# Patient Record
Sex: FEMALE | Race: WHITE | NOT HISPANIC OR LATINO | Employment: FULL TIME | URBAN - METROPOLITAN AREA
[De-identification: names, ages, dates, MRNs, and addresses within clinical notes are randomized per-mention and may not be internally consistent; named-entity substitution may affect disease eponyms.]

---

## 2019-05-03 ENCOUNTER — OFFICE VISIT (OUTPATIENT)
Dept: FAMILY MEDICINE CLINIC | Facility: CLINIC | Age: 24
End: 2019-05-03

## 2019-05-03 VITALS
HEART RATE: 87 BPM | WEIGHT: 175.9 LBS | RESPIRATION RATE: 19 BRPM | SYSTOLIC BLOOD PRESSURE: 114 MMHG | HEIGHT: 67 IN | TEMPERATURE: 98.4 F | OXYGEN SATURATION: 99 % | DIASTOLIC BLOOD PRESSURE: 74 MMHG | BODY MASS INDEX: 27.61 KG/M2

## 2019-05-03 DIAGNOSIS — H81.01 MENIERE SYNDROME, RIGHT: Primary | ICD-10-CM

## 2019-05-03 PROCEDURE — 99214 OFFICE O/P EST MOD 30 MIN: CPT | Performed by: FAMILY MEDICINE

## 2019-05-03 RX ORDER — MECLIZINE HYDROCHLORIDE 25 MG/1
25 TABLET ORAL 3 TIMES DAILY PRN
Qty: 21 TABLET | Refills: 0 | Status: SHIPPED | OUTPATIENT
Start: 2019-05-03 | End: 2019-07-03

## 2019-05-03 RX ORDER — TRIAMTERENE AND HYDROCHLOROTHIAZIDE 37.5; 25 MG/1; MG/1
TABLET ORAL
Qty: 20 TABLET | Refills: 0 | Status: SHIPPED | OUTPATIENT
Start: 2019-05-03 | End: 2019-07-03

## 2019-05-03 NOTE — PROGRESS NOTES
"Subjective   Adriel Armenta is a 23 y.o. female.     Chief Complaint   Patient presents with   • Tinnitus     started  with vertigo and now has ringing in ears        History of Present Illness    About 2 weeks ago woke up with dizziness.  Vertigo.  It lasted for the better part of 2 or 3 days on and off.  Fairly steady throughout the day.  It was worse when she was turning her head to one side.  Or looking up.  She felt unsteady with a spinning sensation.  She had a similar episode a few years ago.  She had some mild meclizine tablets which she started taking that helped the dizziness.  Then she developed some hearing changes and in particular tinnitus which is quite bothersome and bothering her last night.  She has had no headaches.  No visual changes.  She feels a little unsteady walking.  No trouble with coordination.  She drinks about 3 or 4 drinks at night after work.  No focal neurological deficits such as weakness or numbness.  She states she is not pregnant.  LMP less than 1 month ago.      The following portions of the patient's history were reviewed and updated as appropriate: allergies, current medications, past family history, past medical history, past social history, past surgical history and problem list.          Review of Systems   Constitutional: Negative.  Negative for fatigue and fever.   HENT: Positive for tinnitus. Negative for congestion.    Respiratory: Negative.  Negative for cough.    Cardiovascular: Negative.    Gastrointestinal: Negative.    Musculoskeletal: Negative.    Skin: Negative for rash.   Neurological: Positive for dizziness.       Objective   Blood pressure 114/74, pulse 87, temperature 98.4 °F (36.9 °C), temperature source Oral, resp. rate 19, height 170.2 cm (67\"), weight 79.8 kg (175 lb 14.4 oz), last menstrual period 04/07/2019, SpO2 99 %, not currently breastfeeding.  Physical Exam   Constitutional: She is oriented to person, place, and time. No distress.   No acute " distress.  Nontoxic.   HENT:   Right Ear: Tympanic membrane, external ear and ear canal normal.   Left Ear: Tympanic membrane, external ear and ear canal normal.   Nose: Nose normal.   Mouth/Throat: Oropharynx is clear and moist. No oropharyngeal exudate.   Eyes: Conjunctivae and EOM are normal. Pupils are equal, round, and reactive to light. Right eye exhibits no discharge. Left eye exhibits no discharge. No scleral icterus.   Cardiovascular: Normal rate.   Pulmonary/Chest: Effort normal and breath sounds normal. No stridor. No respiratory distress. She has no wheezes. She has no rales.   No tachypnea   Lymphadenopathy:     She has no cervical adenopathy.   Neurological: She is alert and oriented to person, place, and time. She exhibits normal muscle tone. Coordination normal.   Neurological exam.  Pupils equal and reactive to light.  Extra ocular muscle movements intact all directions.  Face symmetric.   No ataxia.  No dysmetria.  Gait is a little unsteady with heel-to-toe.  Good finger-to-nose.  She has brief rotatory nystagmus with Arnold-Hallpike to the right.  However not reproducible.     Skin: No rash noted.   Nursing note and vitals reviewed.      Assessment/Plan   Adriel was seen today for tinnitus.    Diagnoses and all orders for this visit:    Meniere syndrome, right    Other orders  -     meclizine (ANTIVERT) 25 MG tablet; Take 1 tablet by mouth 3 (Three) Times a Day As Needed for dizziness.  -     triamterene-hydrochlorothiazide (MAXZIDE-25) 37.5-25 MG per tablet; 1/2 - 1 tab po qd for meniere's syndrome      Vertigo.  Tinnitus.  Suggestive of Ménière syndrome.  Likely peripheral vertical and not central.  No red flags at this time.  I recommend meclizine as needed.  Recommend Maxide 1/2 tablet to start with daily.  I will see her back in 3 weeks.  If symptoms get much worse will need further investigation sooner.  With persistent symptoms I recommend an MRI of the vestibular components.  She will call  with questions

## 2019-07-03 ENCOUNTER — OFFICE VISIT (OUTPATIENT)
Dept: FAMILY MEDICINE CLINIC | Facility: CLINIC | Age: 24
End: 2019-07-03

## 2019-07-03 VITALS
TEMPERATURE: 97.3 F | OXYGEN SATURATION: 99 % | HEIGHT: 68 IN | DIASTOLIC BLOOD PRESSURE: 88 MMHG | WEIGHT: 183.1 LBS | BODY MASS INDEX: 27.75 KG/M2 | SYSTOLIC BLOOD PRESSURE: 129 MMHG | HEART RATE: 92 BPM

## 2019-07-03 DIAGNOSIS — H91.8X3 OTHER SPECIFIED HEARING LOSS OF BOTH EARS: Primary | ICD-10-CM

## 2019-07-03 DIAGNOSIS — G47.09 OTHER INSOMNIA: ICD-10-CM

## 2019-07-03 PROCEDURE — 99213 OFFICE O/P EST LOW 20 MIN: CPT | Performed by: FAMILY MEDICINE

## 2019-07-03 RX ORDER — FEXOFENADINE HCL 180 MG/1
180 TABLET ORAL DAILY
Qty: 30 TABLET | Refills: 2 | Status: SHIPPED | OUTPATIENT
Start: 2019-07-03 | End: 2019-08-15 | Stop reason: SDUPTHER

## 2019-07-03 RX ORDER — FLUTICASONE PROPIONATE 50 MCG
2 SPRAY, SUSPENSION (ML) NASAL DAILY
Qty: 16 G | Refills: 11 | Status: SHIPPED | OUTPATIENT
Start: 2019-07-03 | End: 2019-10-22

## 2019-07-03 NOTE — PROGRESS NOTES
"Subjective   Ardiel Armenta is a 23 y.o. female.     Chief Complaint   Patient presents with   • Hearing Loss     bilateral x 22 months        History of Present Illness    Follow-up tinnitus and vertigo.  About 3 weeks ago I saw her for this.  Had going on for a number of weeks.  Concerned about Ménière syndrome.  She was started on triamterene hydrochlorothiazide 1/2 tablet a day.  She started taking it and the symptoms went away fairly fast.  She is now off the diuretic and has no recurrent tinnitus and no recurrent vertigo.  However she has some ongoing hearing loss.  Sometimes it feels like her ears are full and uncomfortable.  She has occasional allergy symptoms.  Her mother states she has had trouble with hearing for a number of months.  She has to use close captioning on the television.  She has some trouble with sleep she will wake up middle the night.  No snoring.  No recurrent depression symptoms..  No severe anxiety.  No evening caffeine use.  She has some poor sleep hygiene issues.      The following portions of the patient's history were reviewed and updated as appropriate: allergies, current medications, past family history, past medical history, past social history, past surgical history and problem list.          Review of Systems   Constitutional: Negative.  Negative for fatigue and fever.   HENT: Positive for ear pain and hearing loss.    Respiratory: Negative.    Cardiovascular: Negative.    Gastrointestinal: Negative.    Musculoskeletal: Negative.    Skin: Negative for rash.   Psychiatric/Behavioral: Positive for sleep disturbance. Negative for dysphoric mood. The patient is not nervous/anxious.        Objective   Blood pressure 129/88, pulse 92, temperature 97.3 °F (36.3 °C), temperature source Oral, height 172.7 cm (67.99\"), weight 83.1 kg (183 lb 1.6 oz), last menstrual period 06/11/2019, SpO2 99 %, not currently breastfeeding.  Physical Exam   Constitutional: No distress.   No acute " distress.  Nontoxic.   HENT:   Right Ear: Tympanic membrane, external ear and ear canal normal.   Left Ear: Tympanic membrane, external ear and ear canal normal.   Nose: Nose normal.   Mouth/Throat: Oropharynx is clear and moist. No oropharyngeal exudate.   Eyes: Conjunctivae are normal. Right eye exhibits no discharge. Left eye exhibits no discharge. No scleral icterus.   Neck: Normal range of motion. Neck supple. No thyromegaly present.   Cardiovascular: Normal rate.   Pulmonary/Chest: Effort normal and breath sounds normal. No stridor. No respiratory distress. She has no wheezes. She has no rales.   No tachypnea   Lymphadenopathy:     She has no cervical adenopathy.   Skin: No rash noted.   Nursing note and vitals reviewed.      Assessment/Plan   Adriel was seen today for hearing loss.    Diagnoses and all orders for this visit:    Other specified hearing loss of both ears  -     Ambulatory Referral to ENT (Otolaryngology)    Other insomnia    Other orders  -     fluticasone (FLONASE) 50 MCG/ACT nasal spray; 2 sprays into the nostril(s) as directed by provider Daily.  -     fexofenadine (ALLEGRA ALLERGY) 180 MG tablet; Take 1 tablet by mouth Daily.        Hearing loss.  Possible eustachian tube dysfunction.  However I am concerned about intermittent Ménière's syndrome.  I recommend an ENT referral and probable audiometry.  Also recommending Flonase daily and Allegra as needed.  I will see her back as needed.  The referral to ENT has been placed.    Insomnia.  Likely uncomplicated and not related to anxiety or depression.  Sleep hygiene discussed in detail.

## 2019-08-15 ENCOUNTER — OFFICE VISIT (OUTPATIENT)
Dept: FAMILY MEDICINE CLINIC | Facility: CLINIC | Age: 24
End: 2019-08-15

## 2019-08-15 VITALS
HEART RATE: 82 BPM | BODY MASS INDEX: 28.22 KG/M2 | TEMPERATURE: 97.5 F | DIASTOLIC BLOOD PRESSURE: 82 MMHG | SYSTOLIC BLOOD PRESSURE: 127 MMHG | HEIGHT: 68 IN | WEIGHT: 186.2 LBS | OXYGEN SATURATION: 99 %

## 2019-08-15 DIAGNOSIS — F10.10 ALCOHOL ABUSE: ICD-10-CM

## 2019-08-15 DIAGNOSIS — F32.89 OTHER DEPRESSION: Primary | ICD-10-CM

## 2019-08-15 PROBLEM — F32.A DEPRESSION: Status: ACTIVE | Noted: 2019-08-15

## 2019-08-15 PROCEDURE — 99214 OFFICE O/P EST MOD 30 MIN: CPT | Performed by: FAMILY MEDICINE

## 2019-08-15 RX ORDER — BUPROPION HYDROCHLORIDE 150 MG/1
150 TABLET ORAL DAILY
Qty: 30 TABLET | Refills: 3 | Status: SHIPPED | OUTPATIENT
Start: 2019-08-15 | End: 2019-09-06 | Stop reason: SDUPTHER
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
## 2019-08-16 LAB
ALBUMIN SERPL-MCNC: 4.1 G/DL (ref 3.5–5.2)
ALBUMIN/GLOB SERPL: 1.4 G/DL
ALP SERPL-CCNC: 61 U/L (ref 39–117)
ALT SERPL-CCNC: 17 U/L (ref 1–33)
AST SERPL-CCNC: 19 U/L (ref 1–32)
BASOPHILS # BLD AUTO: 0.08 10*3/MM3 (ref 0–0.2)
BASOPHILS NFR BLD AUTO: 0.8 % (ref 0–1.5)
BILIRUB SERPL-MCNC: 0.3 MG/DL (ref 0.2–1.2)
BUN SERPL-MCNC: 15 MG/DL (ref 6–20)
BUN/CREAT SERPL: 18.5 (ref 7–25)
CALCIUM SERPL-MCNC: 9.5 MG/DL (ref 8.6–10.5)
CHLORIDE SERPL-SCNC: 100 MMOL/L (ref 98–107)
CHOLEST SERPL-MCNC: 189 MG/DL (ref 0–200)
CO2 SERPL-SCNC: 26.6 MMOL/L (ref 22–29)
CREAT SERPL-MCNC: 0.81 MG/DL (ref 0.57–1)
EOSINOPHIL # BLD AUTO: 0.27 10*3/MM3 (ref 0–0.4)
EOSINOPHIL NFR BLD AUTO: 2.6 % (ref 0.3–6.2)
ERYTHROCYTE [DISTWIDTH] IN BLOOD BY AUTOMATED COUNT: 12.2 % (ref 12.3–15.4)
GLOBULIN SER CALC-MCNC: 2.9 GM/DL
GLUCOSE SERPL-MCNC: 129 MG/DL (ref 65–99)
HCT VFR BLD AUTO: 47.5 % (ref 34–46.6)
HDLC SERPL-MCNC: 59 MG/DL (ref 40–60)
HGB BLD-MCNC: 15 G/DL (ref 12–15.9)
IMM GRANULOCYTES # BLD AUTO: 0.04 10*3/MM3 (ref 0–0.05)
IMM GRANULOCYTES NFR BLD AUTO: 0.4 % (ref 0–0.5)
LDLC SERPL CALC-MCNC: 100 MG/DL (ref 0–100)
LYMPHOCYTES # BLD AUTO: 3.1 10*3/MM3 (ref 0.7–3.1)
LYMPHOCYTES NFR BLD AUTO: 29.9 % (ref 19.6–45.3)
MCH RBC QN AUTO: 31 PG (ref 26.6–33)
MCHC RBC AUTO-ENTMCNC: 31.6 G/DL (ref 31.5–35.7)
MCV RBC AUTO: 98.1 FL (ref 79–97)
MONOCYTES # BLD AUTO: 0.45 10*3/MM3 (ref 0.1–0.9)
MONOCYTES NFR BLD AUTO: 4.3 % (ref 5–12)
NEUTROPHILS # BLD AUTO: 6.43 10*3/MM3 (ref 1.7–7)
NEUTROPHILS NFR BLD AUTO: 62 % (ref 42.7–76)
NRBC BLD AUTO-RTO: 0 /100 WBC (ref 0–0.2)
PLATELET # BLD AUTO: 331 10*3/MM3 (ref 140–450)
POTASSIUM SERPL-SCNC: 4.3 MMOL/L (ref 3.5–5.2)
PROT SERPL-MCNC: 7 G/DL (ref 6–8.5)
RBC # BLD AUTO: 4.84 10*6/MM3 (ref 3.77–5.28)
SODIUM SERPL-SCNC: 138 MMOL/L (ref 136–145)
TRIGL SERPL-MCNC: 152 MG/DL (ref 0–150)
TSH SERPL DL<=0.005 MIU/L-ACNC: 2.48 MIU/ML (ref 0.27–4.2)
VLDLC SERPL CALC-MCNC: 30.4 MG/DL
WBC # BLD AUTO: 10.37 10*3/MM3 (ref 3.4–10.8)

## 2019-08-16 RX ORDER — FEXOFENADINE HCL 180 MG/1
TABLET ORAL
Qty: 30 TABLET | Refills: 5 | Status: SHIPPED | OUTPATIENT
Start: 2019-08-16 | End: 2019-10-22

## 2019-08-16 NOTE — PROGRESS NOTES
Nonfasting blood work overall looks good.  Liver enzymes okay.  The red blood cell mean cell volume or MCV is slightly elevated.  However no anemia.  This could be related to mild B12 deficiency.  I recommend over-the-counter B12 daily.

## 2019-09-06 RX ORDER — BUPROPION HYDROCHLORIDE 150 MG/1
150 TABLET ORAL DAILY
Qty: 30 TABLET | Refills: 5 | Status: SHIPPED | OUTPATIENT
Start: 2019-09-06 | End: 2019-10-22

## 2019-10-22 ENCOUNTER — OFFICE VISIT (OUTPATIENT)
Dept: OBSTETRICS AND GYNECOLOGY | Facility: CLINIC | Age: 24
End: 2019-10-22

## 2019-10-22 VITALS
WEIGHT: 184.8 LBS | HEIGHT: 68 IN | SYSTOLIC BLOOD PRESSURE: 120 MMHG | BODY MASS INDEX: 28.01 KG/M2 | DIASTOLIC BLOOD PRESSURE: 88 MMHG

## 2019-10-22 DIAGNOSIS — Z01.419 PAP SMEAR, LOW-RISK: ICD-10-CM

## 2019-10-22 DIAGNOSIS — Z01.419 WELL WOMAN EXAM WITH ROUTINE GYNECOLOGICAL EXAM: Primary | ICD-10-CM

## 2019-10-22 DIAGNOSIS — Z11.51 SPECIAL SCREENING EXAMINATION FOR HUMAN PAPILLOMAVIRUS (HPV): ICD-10-CM

## 2019-10-22 LAB

## 2019-10-22 PROCEDURE — 99385 PREV VISIT NEW AGE 18-39: CPT | Performed by: OBSTETRICS & GYNECOLOGY

## 2019-10-22 PROCEDURE — 81025 URINE PREGNANCY TEST: CPT | Performed by: OBSTETRICS & GYNECOLOGY

## 2019-10-22 PROCEDURE — 81002 URINALYSIS NONAUTO W/O SCOPE: CPT | Performed by: OBSTETRICS & GYNECOLOGY

## 2019-10-22 RX ORDER — NORETHINDRONE ACETATE AND ETHINYL ESTRADIOL AND FERROUS FUMARATE 1MG-20(24)
1 KIT ORAL DAILY
Qty: 84 TABLET | Refills: 3 | Status: SHIPPED | OUTPATIENT
Start: 2019-10-22 | End: 2020-09-02 | Stop reason: SDUPTHER

## 2019-10-22 RX ORDER — MECLIZINE HYDROCHLORIDE 25 MG/1
TABLET ORAL
Refills: 0 | COMMUNITY
Start: 2019-08-12 | End: 2019-10-22

## 2019-10-22 NOTE — PROGRESS NOTES
GYN Annual Exam     CC- Here for annual exam.     Adriel Armenta is a 23 y.o. female who presents for annual well woman exam. Periods are regular every 28-30 days, lasting 3 days. Dysmenorrhea:mild, occurring first 1-2 days of flow. Cyclic symptoms include breast tenderness. No intermenstrual bleeding, spotting, or discharge.  Patient is sexually active  yes - boyfriend . Patient is satisfied with her contraception no. Pt is interested in starting OCPs. She has been on OCPs in the past and has tolerated without any problems.    OB History     No data available          Current contraception: none  History of abnormal Pap smear: no  History of abnormal mammogram: no  Family history of uterine, colon or ovarian cancer: no  Family history of breast cancer: no     Hx of +CT    Health Maintenance   Topic Date Due   • Annual Gynecologic Pelvic and Breast Exam  1994   • ANNUAL PHYSICAL  10/30/1997   • HPV VACCINES (1 - Female 3-dose series) 10/30/2009   • PNEUMOCOCCAL VACCINE (19-64 MEDIUM RISK) (1 of 1 - PPSV23) 10/30/2013   • CHLAMYDIA SCREENING  02/06/2017   • PAP SMEAR  03/27/2018   • INFLUENZA VACCINE  08/01/2019   • TDAP/TD VACCINES (2 - Td) 06/18/2029       Past Medical History:   Diagnosis Date   • ASCUS of cervix with negative high risk HPV 09/05/2014   • LGSIL on Pap smear of cervix 03/12/2014    HPV POSITIVE       No past surgical history on file.    No current outpatient medications on file.    Allergies   Allergen Reactions   • Azithromycin    • Iodinated Diagnostic Agents    • Penicillins        Social History     Tobacco Use   • Smoking status: Current Every Day Smoker     Packs/day: 5.00     Years: 3.00     Pack years: 15.00     Types: Cigarettes   • Smokeless tobacco: Never Used   Substance Use Topics   • Alcohol use: Yes     Comment: EVERY WEEKEND    • Drug use: No       History reviewed. No pertinent family history.    Review of Systems   Constitutional: Negative for activity change, appetite  "change and unexpected weight change.   Gastrointestinal: Negative for abdominal distention and abdominal pain.   Genitourinary: Negative for dyspareunia, menstrual problem, pelvic pain, vaginal bleeding, vaginal discharge and vaginal pain.       /88   Ht 172.7 cm (67.99\")   Wt 83.8 kg (184 lb 12.8 oz)   LMP 10/03/2019 (Approximate)   Breastfeeding? No   BMI 28.11 kg/m²     Physical Exam   Constitutional: She is oriented to person, place, and time. She appears well-developed and well-nourished.   HENT:   Mouth/Throat: Normal dentition. No dental caries.   Cardiovascular: Normal rate and regular rhythm.   Pulmonary/Chest: Effort normal and breath sounds normal. Right breast exhibits no inverted nipple, no mass, no nipple discharge, no skin change and no tenderness. Left breast exhibits no inverted nipple, no mass, no nipple discharge, no skin change and no tenderness.   Abdominal: Soft. She exhibits no distension and no mass. There is no tenderness.   Genitourinary: There is no rash, tenderness or lesion on the right labia. There is no rash, tenderness or lesion on the left labia. Uterus is not deviated, not enlarged, not fixed and not tender. Cervix exhibits no motion tenderness, no discharge and no friability. Right adnexum displays no mass, no tenderness and no fullness. Left adnexum displays no mass, no tenderness and no fullness. No tenderness or bleeding in the vagina. No vaginal discharge found.   Neurological: She is alert and oriented to person, place, and time.   Psychiatric: She has a normal mood and affect. Her behavior is normal. Judgment and thought content normal.   Vitals reviewed.         Assessment/Plan    1) GYN HM: check pap smear   SBE demonstrated and encouraged.  2) STD screening: check full panel  3) Contraception: desires to start OCPs  4) Family Planning: desires children in the future  5) Diet and Exercise discussed  6) Smoking Status: 1 pack every 3 days.  7) Social:  at " [employer]  8) Follow up prn and 1 year       Diagnoses and all orders for this visit:    Well woman exam with routine gynecological exam  -     POC Pregnancy, Urine  -     POC Urinalysis Dipstick  -     RPR, Rfx Qn RPR / Confirm TP  -     Hepatitis B Surface Antigen  -     Hepatitis C Antibody  -     HIV-1 / O / 2 Ag / Antibody 4th Generation  -     HSV 1 & 2 - Specific Antibody, IgG    Pap smear, low-risk  -     Pap IG, Ct-Ng TV Rfx HPV ASCU    Special screening examination for human papillomavirus (HPV)  -     Pap IG, Ct-Ng TV Rfx HPV ASCU    Other orders  -     Discontinue: meclizine (ANTIVERT) 25 MG tablet; TAKE 1 TABLET BY MOUTH EVERY 6 HOURS AS NEEDED FOR DIZZINESS          Katelyn Sanchez DO  10/22/2019  11:03 AM

## 2019-10-23 LAB
HBV SURFACE AG SERPL QL IA: NEGATIVE
HCV AB S/CO SERPL IA: 0.1 S/CO RATIO (ref 0–0.9)
HIV 1+2 AB+HIV1 P24 AG SERPL QL IA: NON REACTIVE
HSV1 IGG SER IA-ACNC: <0.91 INDEX (ref 0–0.9)
HSV2 IGG SER IA-ACNC: <0.91 INDEX (ref 0–0.9)
RPR SER QL: NON REACTIVE

## 2019-10-24 LAB
C TRACH RRNA CVX QL NAA+PROBE: NEGATIVE
CONV .: ABNORMAL
CYTOLOGIST CVX/VAG CYTO: ABNORMAL
CYTOLOGY CVX/VAG DOC CYTO: ABNORMAL
CYTOLOGY CVX/VAG DOC THIN PREP: ABNORMAL
DX ICD CODE: ABNORMAL
DX ICD CODE: ABNORMAL
HIV 1 & 2 AB SER-IMP: ABNORMAL
N GONORRHOEA RRNA CVX QL NAA+PROBE: NEGATIVE
OTHER STN SPEC: ABNORMAL
PATHOLOGIST CVX/VAG CYTO: ABNORMAL
RECOM F/U CVX/VAG CYTO: ABNORMAL
STAT OF ADQ CVX/VAG CYTO-IMP: ABNORMAL
T VAGINALIS RRNA SPEC QL NAA+PROBE: NEGATIVE

## 2020-04-05 RX ORDER — TRIAMTERENE AND HYDROCHLOROTHIAZIDE 37.5; 25 MG/1; MG/1
TABLET ORAL
Qty: 20 TABLET | Refills: 0 | OUTPATIENT
Start: 2020-04-05

## 2020-04-05 NOTE — TELEPHONE ENCOUNTER
Patient needs telehealth appointment for further refills.  Patient canceled last visit in November.

## 2020-05-28 ENCOUNTER — OFFICE VISIT (OUTPATIENT)
Dept: FAMILY MEDICINE CLINIC | Facility: CLINIC | Age: 25
End: 2020-05-28

## 2020-05-28 VITALS
RESPIRATION RATE: 16 BRPM | OXYGEN SATURATION: 99 % | SYSTOLIC BLOOD PRESSURE: 122 MMHG | BODY MASS INDEX: 30.98 KG/M2 | HEIGHT: 68 IN | WEIGHT: 204.4 LBS | HEART RATE: 88 BPM | TEMPERATURE: 97.8 F | DIASTOLIC BLOOD PRESSURE: 87 MMHG

## 2020-05-28 DIAGNOSIS — N13.30 HYDROCALYCOSIS: Primary | ICD-10-CM

## 2020-05-28 DIAGNOSIS — L90.6 STRETCH MARKS: ICD-10-CM

## 2020-05-28 DIAGNOSIS — R63.5 WEIGHT GAIN: ICD-10-CM

## 2020-05-28 PROCEDURE — 99214 OFFICE O/P EST MOD 30 MIN: CPT | Performed by: FAMILY MEDICINE

## 2020-05-28 NOTE — PROGRESS NOTES
"Subjective   Adriel Armenta is a 24 y.o. female.     Chief Complaint   Patient presents with   • stretch marks     lower abdomin         History of Present Illness    24-year-old female.  She would like referral to urologist.  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  She has no urological symptoms.  She had normal kidney function a few months ago and has had normal creatinine levels for the last 3 or 4 years.  She was last evaluated by urologist about 5 or 6 years ago.  She has no urological symptoms.  No hematuria.  No frequent urination.  Also no polydipsia.  No polyuria.  No muscle weakness.    Stretch marks noted on the abdomen.  No diabetes.  She was told by a doctor many years ago this could be related to cortisol level issues.  I do not believe it was worked up.  She has gained weight with the pandemic.    At risk for alcohol use disorder.  Audit C screen today is 5.  Normal LFTs previously.  She states she has been drinking much less lately.  She does not believe she is suffering from alcohol use disorder or alcoholism.  She has had some consequences with drinking in the past.  Some control issues.      The following portions of the patient's history were reviewed and updated as appropriate: allergies, current medications, past family history, past medical history, past social history, past surgical history and problem list.          Review of Systems   Constitutional: Negative.    Respiratory: Negative.    Cardiovascular: Negative.    Gastrointestinal: Negative.    Genitourinary: Negative.    Musculoskeletal: Negative.    Psychiatric/Behavioral: Negative.        Objective   Blood pressure 122/87, pulse 88, temperature 97.8 °F (36.6 °C), resp. rate 16, height 172.7 cm (68\"), weight 92.7 kg (204 lb 6.4 oz), SpO2 99 %, not currently breastfeeding.  Physical Exam   Constitutional: She appears " well-developed and well-nourished. No distress.   HENT:   No moon facies, no buffalo hump     Neck: No thyromegaly present.   Cardiovascular: Normal rate, regular rhythm, normal heart sounds and intact distal pulses.   Pulmonary/Chest: Effort normal and breath sounds normal.   Abdominal:   Purple striae/stretch marks noted.   Musculoskeletal: She exhibits no edema.   No abnormal pigmentation noted   Skin: Skin is warm and dry.   Psychiatric: She has a normal mood and affect. Her behavior is normal. Judgment and thought content normal.   Nursing note and vitals reviewed.      Assessment/Plan   Adriel was seen today for stretch marks.    Diagnoses and all orders for this visit:    Hydrocalycosis  -     US Renal Bilateral; Future  -     Ambulatory Referral to Urology  -     Cortisol - AM; Future  -     Hemoglobin A1c; Future  -     Comprehensive Metabolic Panel; Future  -     Lipid Panel; Future  -     TSH Rfx On Abnormal To Free T4; Future  -     Urinalysis With Microscopic If Indicated (No Culture) - Urine, Clean Catch; Future    Weight gain  -     Cortisol - AM; Future  -     Hemoglobin A1c; Future  -     Comprehensive Metabolic Panel; Future  -     Lipid Panel; Future  -     TSH Rfx On Abnormal To Free T4; Future  -     Urinalysis With Microscopic If Indicated (No Culture) - Urine, Clean Catch; Future    Stretch marks  -     Cortisol - AM; Future  -     Hemoglobin A1c; Future  -     Comprehensive Metabolic Panel; Future  -     Lipid Panel; Future  -     TSH Rfx On Abnormal To Free T4; Future  -     Urinalysis With Microscopic If Indicated (No Culture) - Urine, Clean Catch; Future      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  Unsure of significance.  I am referring to urology.  Ordering a formal ultrasound study.  Point-of-care ultrasound today reveals multiple cysts in the right kidney with limited visualization.  She understands to contact us if she has not heard from our referral people within the next  week.    Weight gain.  Stretch marks.  Likely related to endogenous obesity.  From eating and lack of exercise.  The stretch marks are very prominent.  I am to work her up for Cushing syndrome, although the diagnosis is less likely.  Above lab work ordered.  I will see her back pending test results.    History of heavy alcohol use.  Less now.  At risk for alcohol use disorder.  Counseling given.  In the future if she has trouble cutting back, I recommend counseling.  Also recommend consideration of naltrexone if indicated.

## 2020-06-05 ENCOUNTER — HOSPITAL ENCOUNTER (OUTPATIENT)
Dept: ULTRASOUND IMAGING | Facility: HOSPITAL | Age: 25
Discharge: HOME | End: 2020-06-05
Admitting: FAMILY MEDICINE

## 2020-06-05 DIAGNOSIS — N13.30 HYDROCALYCOSIS: ICD-10-CM

## 2020-06-05 PROCEDURE — 76775 US EXAM ABDO BACK WALL LIM: CPT

## 2020-06-10 NOTE — PROGRESS NOTES
I called patient.  Spoke with her about unchanged ultrasound.  Multiple kidney cysts.  She is going to follow-up with Dr. Stephen France, the urologist for further monitoring.

## 2020-09-02 RX ORDER — NORETHINDRONE ACETATE AND ETHINYL ESTRADIOL AND FERROUS FUMARATE 1MG-20(24)
1 KIT ORAL DAILY
Qty: 84 TABLET | Refills: 0 | Status: SHIPPED | OUTPATIENT
Start: 2020-09-02

## 2024-03-19 ENCOUNTER — HOSPITAL ENCOUNTER (EMERGENCY)
Facility: HOSPITAL | Age: 29
Discharge: HOME | End: 2024-03-19
Attending: EMERGENCY MEDICINE | Admitting: EMERGENCY MEDICINE
Payer: COMMERCIAL

## 2024-03-19 ENCOUNTER — APPOINTMENT (OUTPATIENT)
Dept: CT IMAGING | Facility: HOSPITAL | Age: 29
End: 2024-03-19
Payer: COMMERCIAL

## 2024-03-19 VITALS
HEART RATE: 104 BPM | DIASTOLIC BLOOD PRESSURE: 101 MMHG | SYSTOLIC BLOOD PRESSURE: 139 MMHG | WEIGHT: 163 LBS | BODY MASS INDEX: 24.71 KG/M2 | OXYGEN SATURATION: 97 % | RESPIRATION RATE: 16 BRPM | HEIGHT: 68 IN | TEMPERATURE: 97.9 F

## 2024-03-19 DIAGNOSIS — N28.1 BILATERAL RENAL CYSTS: ICD-10-CM

## 2024-03-19 DIAGNOSIS — R31.0 GROSS HEMATURIA: Primary | ICD-10-CM

## 2024-03-19 DIAGNOSIS — N20.0 RENAL STONES: ICD-10-CM

## 2024-03-19 LAB
ALBUMIN SERPL-MCNC: 4.6 G/DL (ref 3.5–5.2)
ALBUMIN/GLOB SERPL: 1.2 G/DL
ALP SERPL-CCNC: 97 U/L (ref 39–117)
ALT SERPL W P-5'-P-CCNC: 45 U/L (ref 1–33)
ANION GAP SERPL CALCULATED.3IONS-SCNC: 12 MMOL/L (ref 5–15)
AST SERPL-CCNC: 33 U/L (ref 1–32)
BACTERIA UR QL AUTO: ABNORMAL /HPF
BASOPHILS # BLD AUTO: 0.11 10*3/MM3 (ref 0–0.2)
BASOPHILS NFR BLD AUTO: 1 % (ref 0–1.5)
BILIRUB SERPL-MCNC: 0.4 MG/DL (ref 0–1.2)
BILIRUB UR QL STRIP: NEGATIVE
BUN SERPL-MCNC: 8 MG/DL (ref 6–20)
BUN/CREAT SERPL: 9.5 (ref 7–25)
CALCIUM SPEC-SCNC: 10.1 MG/DL (ref 8.6–10.5)
CHLORIDE SERPL-SCNC: 104 MMOL/L (ref 98–107)
CLARITY UR: ABNORMAL
CO2 SERPL-SCNC: 25 MMOL/L (ref 22–29)
COLOR UR: ABNORMAL
CREAT SERPL-MCNC: 0.84 MG/DL (ref 0.57–1)
DEPRECATED RDW RBC AUTO: 43.1 FL (ref 37–54)
EGFRCR SERPLBLD CKD-EPI 2021: 96.6 ML/MIN/1.73
EOSINOPHIL # BLD AUTO: 0.04 10*3/MM3 (ref 0–0.4)
EOSINOPHIL NFR BLD AUTO: 0.4 % (ref 0.3–6.2)
ERYTHROCYTE [DISTWIDTH] IN BLOOD BY AUTOMATED COUNT: 11.9 % (ref 12.3–15.4)
GLOBULIN UR ELPH-MCNC: 3.7 GM/DL
GLUCOSE SERPL-MCNC: 102 MG/DL (ref 65–99)
GLUCOSE UR STRIP-MCNC: NEGATIVE MG/DL
HCG SERPL QL: NEGATIVE
HCT VFR BLD AUTO: 44.5 % (ref 34–46.6)
HGB BLD-MCNC: 15.6 G/DL (ref 12–15.9)
HGB UR QL STRIP.AUTO: ABNORMAL
HOLD SPECIMEN: NORMAL
HYALINE CASTS UR QL AUTO: ABNORMAL /LPF
IMM GRANULOCYTES # BLD AUTO: 0.05 10*3/MM3 (ref 0–0.05)
IMM GRANULOCYTES NFR BLD AUTO: 0.4 % (ref 0–0.5)
KETONES UR QL STRIP: NEGATIVE
LEUKOCYTE ESTERASE UR QL STRIP.AUTO: ABNORMAL
LIPASE SERPL-CCNC: 67 U/L (ref 13–60)
LYMPHOCYTES # BLD AUTO: 1.98 10*3/MM3 (ref 0.7–3.1)
LYMPHOCYTES NFR BLD AUTO: 17.6 % (ref 19.6–45.3)
MCH RBC QN AUTO: 35.1 PG (ref 26.6–33)
MCHC RBC AUTO-ENTMCNC: 35.1 G/DL (ref 31.5–35.7)
MCV RBC AUTO: 100 FL (ref 79–97)
MONOCYTES # BLD AUTO: 0.84 10*3/MM3 (ref 0.1–0.9)
MONOCYTES NFR BLD AUTO: 7.5 % (ref 5–12)
NEUTROPHILS NFR BLD AUTO: 73.1 % (ref 42.7–76)
NEUTROPHILS NFR BLD AUTO: 8.24 10*3/MM3 (ref 1.7–7)
NITRITE UR QL STRIP: NEGATIVE
NRBC BLD AUTO-RTO: 0 /100 WBC (ref 0–0.2)
PH UR STRIP.AUTO: 6 [PH] (ref 5–8)
PLATELET # BLD AUTO: 274 10*3/MM3 (ref 140–450)
PMV BLD AUTO: 9.1 FL (ref 6–12)
POTASSIUM SERPL-SCNC: 3.7 MMOL/L (ref 3.5–5.2)
PROT SERPL-MCNC: 8.3 G/DL (ref 6–8.5)
PROT UR QL STRIP: ABNORMAL
RBC # BLD AUTO: 4.45 10*6/MM3 (ref 3.77–5.28)
RBC # UR STRIP: ABNORMAL /HPF
REF LAB TEST METHOD: ABNORMAL
SODIUM SERPL-SCNC: 141 MMOL/L (ref 136–145)
SP GR UR STRIP: <1.005 (ref 1–1.03)
SQUAMOUS #/AREA URNS HPF: ABNORMAL /HPF
UROBILINOGEN UR QL STRIP: ABNORMAL
WBC # UR STRIP: ABNORMAL /HPF
WBC NRBC COR # BLD AUTO: 11.26 10*3/MM3 (ref 3.4–10.8)
WHOLE BLOOD HOLD COAG: NORMAL
WHOLE BLOOD HOLD SPECIMEN: NORMAL

## 2024-03-19 PROCEDURE — 81001 URINALYSIS AUTO W/SCOPE: CPT | Performed by: EMERGENCY MEDICINE

## 2024-03-19 PROCEDURE — 80053 COMPREHEN METABOLIC PANEL: CPT | Performed by: EMERGENCY MEDICINE

## 2024-03-19 PROCEDURE — 96374 THER/PROPH/DIAG INJ IV PUSH: CPT

## 2024-03-19 PROCEDURE — 84703 CHORIONIC GONADOTROPIN ASSAY: CPT | Performed by: EMERGENCY MEDICINE

## 2024-03-19 PROCEDURE — 83690 ASSAY OF LIPASE: CPT | Performed by: EMERGENCY MEDICINE

## 2024-03-19 PROCEDURE — 87086 URINE CULTURE/COLONY COUNT: CPT | Performed by: PHYSICIAN ASSISTANT

## 2024-03-19 PROCEDURE — 74176 CT ABD & PELVIS W/O CONTRAST: CPT

## 2024-03-19 PROCEDURE — 85025 COMPLETE CBC W/AUTO DIFF WBC: CPT | Performed by: EMERGENCY MEDICINE

## 2024-03-19 PROCEDURE — 99284 EMERGENCY DEPT VISIT MOD MDM: CPT

## 2024-03-19 PROCEDURE — 36415 COLL VENOUS BLD VENIPUNCTURE: CPT

## 2024-03-19 PROCEDURE — 25010000002 KETOROLAC TROMETHAMINE PER 15 MG: Performed by: PHYSICIAN ASSISTANT

## 2024-03-19 PROCEDURE — 25810000003 SODIUM CHLORIDE 0.9 % SOLUTION: Performed by: PHYSICIAN ASSISTANT

## 2024-03-19 PROCEDURE — 96361 HYDRATE IV INFUSION ADD-ON: CPT

## 2024-03-19 RX ORDER — KETOROLAC TROMETHAMINE 30 MG/ML
30 INJECTION, SOLUTION INTRAMUSCULAR; INTRAVENOUS ONCE
Status: COMPLETED | OUTPATIENT
Start: 2024-03-19 | End: 2024-03-19

## 2024-03-19 RX ORDER — SODIUM CHLORIDE 0.9 % (FLUSH) 0.9 %
10 SYRINGE (ML) INJECTION AS NEEDED
Status: DISCONTINUED | OUTPATIENT
Start: 2024-03-19 | End: 2024-03-19 | Stop reason: HOSPADM

## 2024-03-19 RX ADMIN — KETOROLAC TROMETHAMINE 30 MG: 30 INJECTION, SOLUTION INTRAMUSCULAR at 02:52

## 2024-03-19 RX ADMIN — SODIUM CHLORIDE 1000 ML: 9 INJECTION, SOLUTION INTRAVENOUS at 02:52

## 2024-03-19 NOTE — ED PROVIDER NOTES
MD ATTESTATION NOTE    SHARED VISIT: This visit was performed by BOTH a physician and an APC. The substantive portion of the medical decision making was performed by this attesting physician who made or approved the management plan and takes responsibility for patient management. All studies documented in the APC note (if performed) were independently interpreted by me.    The RENÉ and I have discussed this patient's history, physical exam, MDM, and treatment plan.  I have reviewed the documentation and personally had a face to face interaction with the patient. The attached note describes my personal findings.      Adriel Armenta is a 28 y.o. female who presents to the ED c/o acute bilateral flank pain left greater than right intermittent for the past couple days.  Patient notes her urine has been dark and bloody for the past few days as well.  No fever chills no nausea vomiting.  No chest pain no shortness of breath.    On exam:  GENERAL: not distressed  HENT: nares patent  EYES: no scleral icterus  CV: regular rhythm, regular rate  RESPIRATORY: normal effort  ABDOMEN: soft, mild left flank tenderness  MUSCULOSKELETAL: no deformity  NEURO: alert, moves all extremities, follows commands  SKIN: warm, dry    Labs  Recent Results (from the past 24 hour(s))   Comprehensive Metabolic Panel    Collection Time: 03/19/24  1:18 AM    Specimen: Arm, Right; Blood   Result Value Ref Range    Glucose 102 (H) 65 - 99 mg/dL    BUN 8 6 - 20 mg/dL    Creatinine 0.84 0.57 - 1.00 mg/dL    Sodium 141 136 - 145 mmol/L    Potassium 3.7 3.5 - 5.2 mmol/L    Chloride 104 98 - 107 mmol/L    CO2 25.0 22.0 - 29.0 mmol/L    Calcium 10.1 8.6 - 10.5 mg/dL    Total Protein 8.3 6.0 - 8.5 g/dL    Albumin 4.6 3.5 - 5.2 g/dL    ALT (SGPT) 45 (H) 1 - 33 U/L    AST (SGOT) 33 (H) 1 - 32 U/L    Alkaline Phosphatase 97 39 - 117 U/L    Total Bilirubin 0.4 0.0 - 1.2 mg/dL    Globulin 3.7 gm/dL    A/G Ratio 1.2 g/dL    BUN/Creatinine Ratio 9.5 7.0 - 25.0     Anion Gap 12.0 5.0 - 15.0 mmol/L    eGFR 96.6 >60.0 mL/min/1.73   Lipase    Collection Time: 03/19/24  1:18 AM    Specimen: Arm, Right; Blood   Result Value Ref Range    Lipase 67 (H) 13 - 60 U/L   hCG, Serum, Qualitative    Collection Time: 03/19/24  1:18 AM    Specimen: Arm, Right; Blood   Result Value Ref Range    HCG Qualitative Negative Negative   Green Top (Gel)    Collection Time: 03/19/24  1:18 AM   Result Value Ref Range    Extra Tube Hold for add-ons.    Lavender Top    Collection Time: 03/19/24  1:18 AM   Result Value Ref Range    Extra Tube hold for add-on    Light Blue Top    Collection Time: 03/19/24  1:18 AM   Result Value Ref Range    Extra Tube Hold for add-ons.    CBC Auto Differential    Collection Time: 03/19/24  1:18 AM    Specimen: Arm, Right; Blood   Result Value Ref Range    WBC 11.26 (H) 3.40 - 10.80 10*3/mm3    RBC 4.45 3.77 - 5.28 10*6/mm3    Hemoglobin 15.6 12.0 - 15.9 g/dL    Hematocrit 44.5 34.0 - 46.6 %    .0 (H) 79.0 - 97.0 fL    MCH 35.1 (H) 26.6 - 33.0 pg    MCHC 35.1 31.5 - 35.7 g/dL    RDW 11.9 (L) 12.3 - 15.4 %    RDW-SD 43.1 37.0 - 54.0 fl    MPV 9.1 6.0 - 12.0 fL    Platelets 274 140 - 450 10*3/mm3    Neutrophil % 73.1 42.7 - 76.0 %    Lymphocyte % 17.6 (L) 19.6 - 45.3 %    Monocyte % 7.5 5.0 - 12.0 %    Eosinophil % 0.4 0.3 - 6.2 %    Basophil % 1.0 0.0 - 1.5 %    Immature Grans % 0.4 0.0 - 0.5 %    Neutrophils, Absolute 8.24 (H) 1.70 - 7.00 10*3/mm3    Lymphocytes, Absolute 1.98 0.70 - 3.10 10*3/mm3    Monocytes, Absolute 0.84 0.10 - 0.90 10*3/mm3    Eosinophils, Absolute 0.04 0.00 - 0.40 10*3/mm3    Basophils, Absolute 0.11 0.00 - 0.20 10*3/mm3    Immature Grans, Absolute 0.05 0.00 - 0.05 10*3/mm3    nRBC 0.0 0.0 - 0.2 /100 WBC   Urinalysis With Microscopic If Indicated (No Culture) - Urine, Clean Catch    Collection Time: 03/19/24  1:27 AM    Specimen: Urine, Clean Catch   Result Value Ref Range    Color, UA Other (A) Yellow, Straw    Appearance, UA Cloudy (A) Clear     pH, UA 6.0 5.0 - 8.0    Specific Gravity, UA <1.005 (L) 1.005 - 1.030    Glucose, UA Negative Negative    Ketones, UA Negative Negative    Bilirubin, UA Negative Negative    Blood, UA Large (3+) (A) Negative    Protein, UA 30 mg/dL (1+) (A) Negative    Leuk Esterase, UA Large (3+) (A) Negative    Nitrite, UA Negative Negative    Urobilinogen, UA 0.2 E.U./dL 0.2 - 1.0 E.U./dL   Urinalysis, Microscopic Only - Urine, Clean Catch    Collection Time: 03/19/24  1:27 AM    Specimen: Urine, Clean Catch   Result Value Ref Range    RBC, UA 21-50 (A) None Seen, 0-2 /HPF    WBC, UA Too Numerous to Count (A) None Seen, 0-2 /HPF    Bacteria, UA 2+ (A) None Seen /HPF    Squamous Epithelial Cells, UA 3-6 (A) None Seen, 0-2 /HPF    Hyaline Casts, UA 3-6 None Seen /LPF    Methodology Manual Light Microscopy        Radiology  CT Abdomen Pelvis Without Contrast    Result Date: 3/19/2024  CT OF THE ABDOMEN PELVIS WITHOUT CONTRAST  HISTORY: Bilateral flank pain  COMPARISON: September 28, 2014  TECHNIQUE: Axial CT imaging was obtained through the abdomen and pelvis. No IV contrast was administered.  FINDINGS: This study was performed at 3:01 a.m. It was submitted for my interpretation at 4:43 a.m. Images through the lung bases do not demonstrate any acute abnormalities. Patient appears to have mild hepatic steatosis. The stomach, duodenum, adrenal glands, spleen, pancreas, and gallbladder are all normal. No hydronephrosis is seen on either side. There are bilateral renal cysts. There is a stone identified within one of the cyst within the right kidney. This may represent a calyceal diverticulum. The stone measures up to 7 mm, previously measuring 4 mm. Additional stones are noted within the left kidney, with the larger measuring 4 mm. These 2 also appear to be located within the cysts. These may reflect further calyceal diverticula. There are also areas of cortical thinning involving the superior pole of the left kidney. Configuration  does raise the possibility of multiple dilated calyces, rather than simple cysts. No distal ureteral or bladder stones are seen. Urinary bladder appears unremarkable. Uterus is normal. There is no bowel obstruction. The appendix is absent. No pneumatosis or free air is seen. No acute osseous abnormalities are identified.       1. No acute intra-abdominal or intrapelvic process is seen. Patient is again noted to have bilateral renal cysts. There are also bilateral renal stones. Stones within the right kidney may be present within a calyceal diverticulum. The cluster of cysts with calculi within the within the superior pole of the left kidney raise the possibility of calyceal diverticula as well. Renal protocol CT or MRI could be considered for further assessment on a nonemergent basis.   Radiation dose reduction techniques were utilized, including automated exposure control and exposure modulation based on body size.   This report was finalized on 3/19/2024 5:10 AM by Dr. Teri Grullon M.D on Workstation: [id number]       Medications given in the ED:  Medications   sodium chloride 0.9 % flush 10 mL (has no administration in time range)   sodium chloride 0.9 % bolus 1,000 mL (0 mL Intravenous Stopped 3/19/24 0513)   ketorolac (TORADOL) injection 30 mg (30 mg Intravenous Given 3/19/24 0252)       Orders placed during this visit:  Orders Placed This Encounter   Procedures    Urine Culture - Urine,    CT Abdomen Pelvis Without Contrast    [place] Draw    Comprehensive Metabolic Panel    Lipase    Urinalysis With Microscopic If Indicated (No Culture) - Urine, Clean Catch    hCG, Serum, Qualitative    CBC Auto Differential    Urinalysis, Microscopic Only - Urine, Clean Catch    NPO Diet NPO Type: Strict NPO    Undress & Gown    Insert Peripheral IV    CBC & Differential    Green Top (Gel)    Lavender Top    Light Blue Top       Medical Decision Making:  ED Course as of 03/19/24 0737   Tue Mar 19, 2024   0150 Blood,  UA(!): Large (3+) [CC]   0150 WBC(!): 11.26 [CC]   0150 Hemoglobin: 15.6 [CC]   0151 Creatinine: 0.84 [CC]   0151 BUN: 8 [CC]   0156 I discussed the case with Dr. Stack and they agree to evaluate the patient at the bedside.    [CC]   0214 Bacteria, UA(!): 2+ [CC]   0214 RBC, UA(!): 21-50 [CC]   0214 WBC, UA(!): Too Numerous to Count [CC]   0315 CT Abdomen Pelvis Without Contrast  My independent interpretation of the CT is kidney stones in the kidneys and renal cysts.  No obvious ureteral stone. [CC]   0518 I rechecked the patient.  I discussed the patient's labs, radiology findings (including all incidental findings), diagnosis, and plan for discharge.  A repeat exam reveals no new worrisome changes from my initial exam findings.  The patient understands that the fact that they are being discharged does not denote that nothing is abnormal, it indicates that no clinical emergency is present and that they must follow-up as directed in order to properly maintain their health.  Follow-up instructions (specifically listed below) and return to ER precautions were given at this time.  I specifically instructed the patient to follow-up with their PCP.  The patient understands and agrees with the plan, and is ready for discharge.  All questions answered.   [CC]      ED Course User Index  [CC] Lauren Pastrana PA-C       Differential diagnosis:  Kidney stone, UTI, pyelonephritis    Diagnosis  Final diagnoses:   Bilateral renal cysts   Renal stones   Gross hematuria          Srinivas Stack MD  03/19/24 0737

## 2024-03-19 NOTE — ED TRIAGE NOTES
"Pt arrives ambulatory to triage desk via PV.    Pt states \"I have blood in urine and both sides of my back hurt.\"    Pt reports having history of kidney disease. Denies any painful urination.   "

## 2024-03-19 NOTE — ED PROVIDER NOTES
EMERGENCY DEPARTMENT ENCOUNTER  Room Number:  08/08  PCP: Provider, No Known  Independent Historians: Patient      HPI:  Chief Complaint: had concerns including Blood in Urine (Bilateral flank pain.).     A complete HPI/ROS/PMH/PSH/SH/FH are unobtainable due to: None    Chronic or social conditions impacting patient care (Social Determinants of Health): None      Context: Adriel Armenta is a 28 y.o. female with a medical history of hydrocalycosis who presents emergency department complaining of bilateral flank pain and hematuria.  Patient says she started with flank pain on Saturday and says it was a dull ache.  She says it moves between the left and the right.  She denies that the pain radiates.  She denies palliative or provocative factors.  She reports that she started having hematuria yesterday.  She denies dysuria, urinary frequency or urgency.  She denies fever or chills.  She says she does have a history of kidney stones but this does not feel like that.  She denies nausea, vomiting or diarrhea.      Review of prior external notes (non-ED) -and- Review of prior external test results outside of this encounter:   I reviewed the CMP from 8/15/2019, creatinine 0.81, BUN 15    06/05/2020: RENAL ULTRASOUND  FINDINGS:   Grayscale and color Doppler images of the kidneys and bladder were  obtained.     The right kidney measures 11.5 cm in length.      The left kidney measures 11.4 cm in length.      The kidneys demonstrate normal echogenicity and cortical thickness.  There is no hydronephrosis. There are no solid contour deforming renal  masses or renal calculi. Bilateral hypoechoic lesions are present,  measuring up to 2.3 cm on the right and 2.8 cm on the left, as seen on  CT 09/28/2014. No internal color Doppler flow is demonstrated within the  hypoechoic lesions.     The bladder is decompressed and not well evaluated..     Visualized portions of the aorta and IVC are normal in caliber and  appearance.      IMPRESSION:  Bilateral cystic lesions within the kidneys, as before, the  largest of which are grossly unchanged since 09/28/2014. There is no  hydronephrosis.      PAST MEDICAL HISTORY  Active Ambulatory Problems     Diagnosis Date Noted    Alcohol abuse 08/15/2019    Depression 08/15/2019     Resolved Ambulatory Problems     Diagnosis Date Noted    No Resolved Ambulatory Problems     Past Medical History:   Diagnosis Date    ASCUS of cervix with negative high risk HPV 09/05/2014    LGSIL on Pap smear of cervix 03/12/2014         PAST SURGICAL HISTORY  Past Surgical History:   Procedure Laterality Date    APPENDECTOMY           FAMILY HISTORY  Family History   Problem Relation Age of Onset    No Known Problems Mother     Diabetes Father     Hyperlipidemia Father     No Known Problems Brother          SOCIAL HISTORY  Social History     Socioeconomic History    Marital status: Single   Tobacco Use    Smoking status: Former     Current packs/day: 5.00     Average packs/day: 5.0 packs/day for 3.0 years (15.0 ttl pk-yrs)     Types: Cigarettes    Smokeless tobacco: Never   Vaping Use    Vaping status: Every Day    Substances: Nicotine, Flavoring    Devices: Disposable   Substance and Sexual Activity    Alcohol use: Yes     Comment: EVERY WEEKEND     Drug use: No    Sexual activity: Yes     Partners: Male     Birth control/protection: OCP         ALLERGIES  Azithromycin, Iodinated contrast media, and Penicillins      REVIEW OF SYSTEMS  Included in HPI  All systems reviewed and negative except for those discussed in HPI.      PHYSICAL EXAM    I have reviewed the triage vital signs and nursing notes.    ED Triage Vitals   Temp Heart Rate Resp BP SpO2   03/19/24 0103 03/19/24 0103 03/19/24 0103 03/19/24 0108 03/19/24 0103   97.9 °F (36.6 °C) (!) 137 18 163/94 98 %      Temp src Heart Rate Source Patient Position BP Location FiO2 (%)   03/19/24 0103 03/19/24 0103 03/19/24 0108 03/19/24 0108 --   Tympanic Monitor Sitting  Right arm        Physical Exam  Constitutional:       General: She is not in acute distress.     Appearance: Normal appearance. She is obese.   HENT:      Head: Normocephalic and atraumatic.      Nose: Nose normal.      Mouth/Throat:      Mouth: Mucous membranes are moist.   Eyes:      Extraocular Movements: Extraocular movements intact.      Pupils: Pupils are equal, round, and reactive to light.   Cardiovascular:      Rate and Rhythm: Normal rate and regular rhythm.      Pulses: Normal pulses.      Heart sounds: Normal heart sounds.   Pulmonary:      Effort: Pulmonary effort is normal. No respiratory distress.      Breath sounds: Normal breath sounds.   Abdominal:      General: Abdomen is flat. There is no distension.      Palpations: Abdomen is soft.      Tenderness: There is no abdominal tenderness. There is no right CVA tenderness, left CVA tenderness, guarding or rebound.   Musculoskeletal:         General: Normal range of motion.      Cervical back: Normal range of motion and neck supple.   Skin:     General: Skin is warm and dry.      Capillary Refill: Capillary refill takes less than 2 seconds.   Neurological:      General: No focal deficit present.      Mental Status: She is alert and oriented to person, place, and time.   Psychiatric:         Mood and Affect: Mood normal.         Behavior: Behavior normal.         LAB RESULTS  Recent Results (from the past 24 hour(s))   Comprehensive Metabolic Panel    Collection Time: 03/19/24  1:18 AM    Specimen: Arm, Right; Blood   Result Value Ref Range    Glucose 102 (H) 65 - 99 mg/dL    BUN 8 6 - 20 mg/dL    Creatinine 0.84 0.57 - 1.00 mg/dL    Sodium 141 136 - 145 mmol/L    Potassium 3.7 3.5 - 5.2 mmol/L    Chloride 104 98 - 107 mmol/L    CO2 25.0 22.0 - 29.0 mmol/L    Calcium 10.1 8.6 - 10.5 mg/dL    Total Protein 8.3 6.0 - 8.5 g/dL    Albumin 4.6 3.5 - 5.2 g/dL    ALT (SGPT) 45 (H) 1 - 33 U/L    AST (SGOT) 33 (H) 1 - 32 U/L    Alkaline Phosphatase 97 39 - 117  U/L    Total Bilirubin 0.4 0.0 - 1.2 mg/dL    Globulin 3.7 gm/dL    A/G Ratio 1.2 g/dL    BUN/Creatinine Ratio 9.5 7.0 - 25.0    Anion Gap 12.0 5.0 - 15.0 mmol/L    eGFR 96.6 >60.0 mL/min/1.73   Lipase    Collection Time: 03/19/24  1:18 AM    Specimen: Arm, Right; Blood   Result Value Ref Range    Lipase 67 (H) 13 - 60 U/L   hCG, Serum, Qualitative    Collection Time: 03/19/24  1:18 AM    Specimen: Arm, Right; Blood   Result Value Ref Range    HCG Qualitative Negative Negative   Green Top (Gel)    Collection Time: 03/19/24  1:18 AM   Result Value Ref Range    Extra Tube Hold for add-ons.    Lavender Top    Collection Time: 03/19/24  1:18 AM   Result Value Ref Range    Extra Tube hold for add-on    Light Blue Top    Collection Time: 03/19/24  1:18 AM   Result Value Ref Range    Extra Tube Hold for add-ons.    CBC Auto Differential    Collection Time: 03/19/24  1:18 AM    Specimen: Arm, Right; Blood   Result Value Ref Range    WBC 11.26 (H) 3.40 - 10.80 10*3/mm3    RBC 4.45 3.77 - 5.28 10*6/mm3    Hemoglobin 15.6 12.0 - 15.9 g/dL    Hematocrit 44.5 34.0 - 46.6 %    .0 (H) 79.0 - 97.0 fL    MCH 35.1 (H) 26.6 - 33.0 pg    MCHC 35.1 31.5 - 35.7 g/dL    RDW 11.9 (L) 12.3 - 15.4 %    RDW-SD 43.1 37.0 - 54.0 fl    MPV 9.1 6.0 - 12.0 fL    Platelets 274 140 - 450 10*3/mm3    Neutrophil % 73.1 42.7 - 76.0 %    Lymphocyte % 17.6 (L) 19.6 - 45.3 %    Monocyte % 7.5 5.0 - 12.0 %    Eosinophil % 0.4 0.3 - 6.2 %    Basophil % 1.0 0.0 - 1.5 %    Immature Grans % 0.4 0.0 - 0.5 %    Neutrophils, Absolute 8.24 (H) 1.70 - 7.00 10*3/mm3    Lymphocytes, Absolute 1.98 0.70 - 3.10 10*3/mm3    Monocytes, Absolute 0.84 0.10 - 0.90 10*3/mm3    Eosinophils, Absolute 0.04 0.00 - 0.40 10*3/mm3    Basophils, Absolute 0.11 0.00 - 0.20 10*3/mm3    Immature Grans, Absolute 0.05 0.00 - 0.05 10*3/mm3    nRBC 0.0 0.0 - 0.2 /100 WBC   Urinalysis With Microscopic If Indicated (No Culture) - Urine, Clean Catch    Collection Time: 03/19/24  1:27 AM     Specimen: Urine, Clean Catch   Result Value Ref Range    Color, UA Other (A) Yellow, Straw    Appearance, UA Cloudy (A) Clear    pH, UA 6.0 5.0 - 8.0    Specific Gravity, UA <1.005 (L) 1.005 - 1.030    Glucose, UA Negative Negative    Ketones, UA Negative Negative    Bilirubin, UA Negative Negative    Blood, UA Large (3+) (A) Negative    Protein, UA 30 mg/dL (1+) (A) Negative    Leuk Esterase, UA Large (3+) (A) Negative    Nitrite, UA Negative Negative    Urobilinogen, UA 0.2 E.U./dL 0.2 - 1.0 E.U./dL   Urinalysis, Microscopic Only - Urine, Clean Catch    Collection Time: 03/19/24  1:27 AM    Specimen: Urine, Clean Catch   Result Value Ref Range    RBC, UA 21-50 (A) None Seen, 0-2 /HPF    WBC, UA Too Numerous to Count (A) None Seen, 0-2 /HPF    Bacteria, UA 2+ (A) None Seen /HPF    Squamous Epithelial Cells, UA 3-6 (A) None Seen, 0-2 /HPF    Hyaline Casts, UA 3-6 None Seen /LPF    Methodology Manual Light Microscopy          RADIOLOGY  CT Abdomen Pelvis Without Contrast    Result Date: 3/19/2024  CT OF THE ABDOMEN PELVIS WITHOUT CONTRAST  HISTORY: Bilateral flank pain  COMPARISON: September 28, 2014  TECHNIQUE: Axial CT imaging was obtained through the abdomen and pelvis. No IV contrast was administered.  FINDINGS: This study was performed at 3:01 a.m. It was submitted for my interpretation at 4:43 a.m. Images through the lung bases do not demonstrate any acute abnormalities. Patient appears to have mild hepatic steatosis. The stomach, duodenum, adrenal glands, spleen, pancreas, and gallbladder are all normal. No hydronephrosis is seen on either side. There are bilateral renal cysts. There is a stone identified within one of the cyst within the right kidney. This may represent a calyceal diverticulum. The stone measures up to 7 mm, previously measuring 4 mm. Additional stones are noted within the left kidney, with the larger measuring 4 mm. These 2 also appear to be located within the cysts. These may reflect  further calyceal diverticula. There are also areas of cortical thinning involving the superior pole of the left kidney. Configuration does raise the possibility of multiple dilated calyces, rather than simple cysts. No distal ureteral or bladder stones are seen. Urinary bladder appears unremarkable. Uterus is normal. There is no bowel obstruction. The appendix is absent. No pneumatosis or free air is seen. No acute osseous abnormalities are identified.       1. No acute intra-abdominal or intrapelvic process is seen. Patient is again noted to have bilateral renal cysts. There are also bilateral renal stones. Stones within the right kidney may be present within a calyceal diverticulum. The cluster of cysts with calculi within the within the superior pole of the left kidney raise the possibility of calyceal diverticula as well. Renal protocol CT or MRI could be considered for further assessment on a nonemergent basis.   Radiation dose reduction techniques were utilized, including automated exposure control and exposure modulation based on body size.   This report was finalized on 3/19/2024 5:10 AM by Dr. Teri Grullon M.D on Workstation: [id number]         MEDICATIONS GIVEN IN ER  Medications   sodium chloride 0.9 % flush 10 mL (has no administration in time range)   sodium chloride 0.9 % bolus 1,000 mL (0 mL Intravenous Stopped 3/19/24 0513)   ketorolac (TORADOL) injection 30 mg (30 mg Intravenous Given 3/19/24 0252)         OUTPATIENT MEDICATION MANAGEMENT:  Current Facility-Administered Medications Ordered in Epic   Medication Dose Route Frequency Provider Last Rate Last Admin    sodium chloride 0.9 % flush 10 mL  10 mL Intravenous PRN Srinivas Stack MD         No current [facility]-ordered outpatient medications on file.           PROGRESS, DATA ANALYSIS, CONSULTS, AND MEDICAL DECISION MAKING  ORDERS PLACED DURING THIS VISIT:  Orders Placed This Encounter   Procedures    Urine Culture - Urine,    CT  Abdomen Pelvis Without Contrast    [facility] Draw    Comprehensive Metabolic Panel    Lipase    Urinalysis With Microscopic If Indicated (No Culture) - Urine, Clean Catch    hCG, Serum, Qualitative    CBC Auto Differential    Urinalysis, Microscopic Only - Urine, Clean Catch    NPO Diet NPO Type: Strict NPO    Undress & Gown    Insert Peripheral IV    CBC & Differential    Green Top (Gel)    Lavender Top    Light Blue Top       All labs have been independently interpreted by me.  All radiology studies have been reviewed by me. All EKG's have been independently viewed and interpreted by me.  Discussion below represents my analysis of pertinent findings related to patient's condition, differential diagnosis, treatment plan and final disposition.    Differential diagnosis includes but is not limited to:   Differential diagnosis for hematuria includes but is not limited to:  - BPH  - hereditary hematuria  - DIC  - coagulopathy  - infection  - infarction  - instrumentation  - tumor  - trauma  - TB  - transient hematuria  - ureteral stones  - sickle cell  - scurvy  - hemorrhagic cystitis       ED Course:  ED Course as of 03/19/24 0543   Tue Mar 19, 2024   0150 Blood, UA(!): Large (3+) [CC]   0150 WBC(!): 11.26 [CC]   0150 Hemoglobin: 15.6 [CC]   0151 Creatinine: 0.84 [CC]   0151 BUN: 8 [CC]   0156 I discussed the case with Dr. Stack and they agree to evaluate the patient at the bedside.    [CC]   0214 Bacteria, UA(!): 2+ [CC]   0214 RBC, UA(!): 21-50 [CC]   0214 WBC, UA(!): Too Numerous to Count [CC]   0315 CT Abdomen Pelvis Without Contrast  My independent interpretation of the CT is kidney stones in the kidneys and renal cysts.  No obvious ureteral stone. [CC]   0518 I rechecked the patient.  I discussed the patient's labs, radiology findings (including all incidental findings), diagnosis, and plan for discharge.  A repeat exam reveals no new worrisome changes from my initial exam findings.  The patient understands  that the fact that they are being discharged does not denote that nothing is abnormal, it indicates that no clinical emergency is present and that they must follow-up as directed in order to properly maintain their health.  Follow-up instructions (specifically listed below) and return to ER precautions were given at this time.  I specifically instructed the patient to follow-up with their PCP.  The patient understands and agrees with the plan, and is ready for discharge.  All questions answered.   [CC]      ED Course User Index  [CC] Lauren Pastrana PA-C         AS OF 05:43 EDT VITALS:    BP - (!) 139/101  HR - 104  TEMP - 97.9 °F (36.6 °C) (Tympanic)  O2 SATS - 97%      MDM:  Patient is a 28-year-old female with a history of Hydrocalycosis who presents emergency department today with gross hematuria.  She has some dull intermittent flank pain but no other systemic symptoms.  She has no dysuria, urinary frequency or urgency.  On arrival here in the emergency department patient is tachycardic but vitals otherwise reassuring, she is afebrile.  On my exam the patient has no appreciable CVA tenderness and no abdominal tenderness.  She was evaluated with labs which are overall reassuring.  Her hemoglobin is 15.6.  She does have gross blood in her urine and does have 2+ bacteria but patient had numerous squamous cells and no dysuria, urinary frequency or urgency.  Will hold on antibiotics and send the urine for culture.  Her CT showed renal cyst that are unchanged from prior.  She also had stones in the kidneys but not in the ureters.  Patient would benefit from further evaluation on an outpatient basis with nephrology.  Will provide patient with referral.  She is tolerating p.o., tachycardia improved with IV fluids, and is appropriate for discharge with outpatient follow-up.        DIAGNOSIS  Final diagnoses:   Bilateral renal cysts   Renal stones   Gross hematuria         DISPOSITION  ED Disposition       ED  Disposition   Discharge    Condition   Stable    Comment   --                      Please note that portions of this document were completed with a voice recognition program.    Note Disclaimer: At [facility], we believe that sharing information builds trust and better relationships. You are receiving this note because you recently visited [facility]. It is possible you will see health information before a provider has talked with you about it. This kind of information can be easy to misunderstand. To help you fully understand what it means for your health, we urge you to discuss this note with your provider.     Lauren Pastrana PA-C  03/19/24 0546

## 2024-03-19 NOTE — Clinical Note
[facility] EMERGENCY DEPARTMENT  [address] [facility] [address]  Phone: [phone]    Adriel Armenta was seen and treated in our emergency department on 3/19/2024.  She may return to work on 03/21/2024.         Thank you for choosing [facility].    Jemima Johnson RN

## 2024-03-19 NOTE — DISCHARGE INSTRUCTIONS
Please follow-up with your PCP and establish care with nephrology    Rest.  Increase fluid intake.  You will need to have further evaluation of your kidneys on an outpatient basis.  This may be done with ultrasound or MRI.    Although you are being discharged in the ED today, I encouraged her to return for worsening symptoms.  Things can, and do, change such a treatment at home with medication may not be adequate.  Specifically I recommend returning for chest pain or discomfort, difficulty breathing, persistent vomiting or difficulty holding down liquids or medications, fever > 102.0 F,  or any other worsening or alarming symptoms.       You have been evaluated in the emergency department for your presenting symptoms and deemed appropriate for discharge home.  Understand that your health care does not end here today.  It is important that your primary care physician be made aware of your visit.  I recommend calling your primary care provider in the next 48 hours to arrange follow-up care.  Your primary care provider needs to review your treatment and recovery to ensure that no further treatment is necessary.  Additional testing or procedures may be necessary as an outpatient at the discretion of your primary care provider.    I also recommend following up with your PCP for recheck of your blood pressure and treatment as needed.

## 2024-03-20 LAB — BACTERIA SPEC AEROBE CULT: NO GROWTH

## 2024-04-01 ENCOUNTER — HOSPITAL ENCOUNTER (EMERGENCY)
Facility: HOSPITAL | Age: 29
Discharge: HOME | End: 2024-04-01
Attending: EMERGENCY MEDICINE
Payer: COMMERCIAL

## 2024-04-01 VITALS
OXYGEN SATURATION: 96 % | WEIGHT: 163 LBS | DIASTOLIC BLOOD PRESSURE: 98 MMHG | TEMPERATURE: 98.2 F | HEIGHT: 68 IN | SYSTOLIC BLOOD PRESSURE: 137 MMHG | RESPIRATION RATE: 18 BRPM | HEART RATE: 101 BPM | BODY MASS INDEX: 24.71 KG/M2

## 2024-04-01 DIAGNOSIS — M79.5 FOREIGN BODY (FB) IN SOFT TISSUE: Primary | ICD-10-CM

## 2024-04-01 PROCEDURE — 25010000002 LIDOCAINE 1 % SOLUTION: Performed by: NURSE PRACTITIONER

## 2024-04-01 PROCEDURE — 99283 EMERGENCY DEPT VISIT LOW MDM: CPT

## 2024-04-01 RX ORDER — CHLORHEXIDINE GLUCONATE ORAL RINSE 1.2 MG/ML
15 SOLUTION DENTAL ONCE
Status: COMPLETED | OUTPATIENT
Start: 2024-04-01 | End: 2024-04-01

## 2024-04-01 RX ORDER — CLINDAMYCIN HYDROCHLORIDE 300 MG/1
300 CAPSULE ORAL 3 TIMES DAILY
Qty: 20 CAPSULE | Refills: 0 | Status: SHIPPED | OUTPATIENT
Start: 2024-04-01 | End: 2024-04-08

## 2024-04-01 RX ORDER — CLINDAMYCIN HYDROCHLORIDE 300 MG/1
300 CAPSULE ORAL ONCE
Status: COMPLETED | OUTPATIENT
Start: 2024-04-01 | End: 2024-04-01

## 2024-04-01 RX ORDER — CHLORHEXIDINE GLUCONATE ORAL RINSE 1.2 MG/ML
15 SOLUTION DENTAL 2 TIMES DAILY
Qty: 210 ML | Refills: 0 | Status: SHIPPED | OUTPATIENT
Start: 2024-04-01 | End: 2024-04-08

## 2024-04-01 RX ORDER — LIDOCAINE HYDROCHLORIDE 10 MG/ML
10 INJECTION, SOLUTION INFILTRATION; PERINEURAL ONCE
Status: COMPLETED | OUTPATIENT
Start: 2024-04-01 | End: 2024-04-01

## 2024-04-01 RX ADMIN — CLINDAMYCIN HYDROCHLORIDE 300 MG: 300 CAPSULE ORAL at 22:56

## 2024-04-01 RX ADMIN — LIDOCAINE HYDROCHLORIDE 10 ML: 10 INJECTION, SOLUTION INFILTRATION; PERINEURAL at 21:51

## 2024-04-01 RX ADMIN — 0.12% CHLORHEXIDINE GLUCONATE 15 ML: 1.2 RINSE ORAL at 23:39

## 2024-04-02 NOTE — DISCHARGE INSTRUCTIONS
You have been given emergency department evaluation.  This evaluation is intended to rule out life-threatening conditions.  Is not a complete evaluation.  You could require further testing as determined by your primary care physician or any referred specialist.  Please follow-up with all doctors that you are referred to.  Please be sure to take your prescribed medications and follow any specific instructions in the discharge instructions.  Please follow-up with your primary care physician within 48 hours.  Please have your primary care provider recheck your blood pressure.  Swish and spit.  Peridex, do not swallow.  It is a good idea to take a probiotic or eat yogurt while taking clindamycin.  Please return to the emergency department if you experience chest pain, shortness of breath, abdominal pain, fever greater than 102, intractable vomiting.  Please return to the emergency department if your symptoms continue or worsen, or if you begin to experience any other concerning symptom.

## 2024-04-02 NOTE — ED PROVIDER NOTES
EMERGENCY DEPARTMENT MD ATTESTATION NOTE    Room Number:  27/27  PCP: Provider, No Known  Independent Historians: Patient    HPI:  A complete HPI/ROS/PMH/PSH/SH/FH are unobtainable due to: None    Chronic or social conditions impacting patient care (Social Determinants of Health): None      Context: Adriel Armenta is a 28 y.o. female with a medical history of ASCUS of cervix who presents to the ED c/o acute piercing complication.  The patient reports that she had a piercing placed a few weeks ago and it has healed over on the inside.  She states she is not able to remove it.  She denies any pain.        Review of prior external notes (non-ED) -and- Review of prior external test results outside of this encounter:  Laboratory evaluation 3/19/2024 with normal CMP, negative pregnancy, normal CBC    Prescription drug monitoring program review:     N/A      PHYSICAL EXAM    I have reviewed the triage vital signs and nursing notes.    ED Triage Vitals   Temp Heart Rate Resp BP SpO2   04/01/24 1927 04/01/24 1927 04/01/24 1927 04/01/24 1932 04/01/24 1927   98.2 °F (36.8 °C) 106 18 136/93 96 %      Temp src Heart Rate Source Patient Position BP Location FiO2 (%)   04/01/24 1927 04/01/24 1927 04/01/24 1932 04/01/24 1932 --   Tympanic Monitor Sitting Right arm        Physical Exam  GENERAL: Awake, alert, no acute distress  SKIN: Warm, dry  HENT: Normocephalic, atraumatic.  Upper lip with piercing in place.  No open wounds on the inner lip.  No erythema.  No drainage.  The backing of the earring is inside the skin of the lip.  EYES: no scleral icterus  CV: regular rhythm, regular rate  RESPIRATORY: normal effort, lungs clear  MUSCULOSKELETAL: no deformity  NEURO: alert, moves all extremities, follows commands            MEDICATIONS GIVEN IN ER  Medications   clindamycin (CLEOCIN) capsule 300 mg (has no administration in time range)   chlorhexidine (PERIDEX) 0.12 % solution 15 mL (has no administration in time range)    lidocaine (XYLOCAINE) 1 % injection 10 mL (10 mL Injection Given by Other 4/1/24 1021)         ORDERS PLACED DURING THIS VISIT:  No orders of the defined types were placed in this encounter.        PROCEDURES  Procedures            PROGRESS, DATA ANALYSIS, CONSULTS, AND MEDICAL DECISION MAKING  All labs have been independently interpreted by me.  All radiology studies have been reviewed by me. All EKG's have been independently viewed and interpreted by me.  Discussion below represents my analysis of pertinent findings related to patient's condition, differential diagnosis, treatment plan and final disposition.    Differential diagnosis includes but is not limited to retained foreign body, cellulitis, abscess.    Clinical Scores:                   ED Course as of 04/01/24 2248   Mon Apr 01, 2024 2027 I discussed the case with Dr. Broderick and they agree to evaluate the patient at the bedside.    [AR]   2235 The patient was reexamined.  They have had symptomatic improvement during their ED stay.  I discussed today's findings with the patient, explaining the pertinent positives and negatives from today's visit, and the plan of care.  Discussed plan for discharge as there is no emergent indication for admission.  Discussed limitation of the ED work-up and that this is to rule out life-threatening emergencies but that they could require further testing as determined by their primary care and or any referred specialist patient is agreeable and understands need for follow-up and repeat exam/testing.  Patient is aware that discharge does not mean there is nothing wrong, indicates no emergency is present, and that they must continue their care with their primary care physician and/or any referred specialist.  They were given appropriate follow-up with their primary care physician and/or specialist.  I had an extensive discussion on the expected clinical course and return precautions.  Patient understands to return to the  emergency department for continuation, worsening, or new symptoms.  I answered any of the patient's questions. Patient was discharged home in a stable condition.     [AR]      ED Course User Index  [AR] May Hernandez APRN       MDM: The earring stud will require manual extraction with I&D.  We will leave the wound open and cover with antibiotics for an infection.      COMPLEXITY OF CARE  Admission was considered but after careful review of the patient's presentation, physical examination, diagnostic results, and response to treatment the patient may be safely discharged with outpatient follow-up.    Please note that portions of this document were completed with a voice recognition program.    Note Disclaimer: At [facility], we believe that sharing information builds trust and better relationships. You are receiving this note because you recently visited [facility]. It is possible you will see health information before a provider has talked with you about it. This kind of information can be easy to misunderstand. To help you fully understand what it means for your health, we urge you to discuss this note with your provider.         Addy Broderick MD  04/01/24 5157       Addy Broderick MD  04/01/24 6365

## 2024-04-02 NOTE — ED PROVIDER NOTES
EMERGENCY DEPARTMENT ENCOUNTER  Room Number:  27/27  PCP: Provider, No Known  Independent Historians: Patient      HPI:  Chief Complaint: had concerns including Piercing Complication.     A complete HPI/ROS/PMH/PSH/SH/FH are unobtainable due to: None    Chronic or social conditions impacting patient care (Social Determinants of Health): None      Context: Adriel Armenta is a 28 y.o. female with a medical history of alcohol abuse, depression, who presents to the emergency department with complaints of complication related to upper lip piercing.  Patient states a couple weeks ago she had the area above her upper lip pierced.  She states she noticed the inside of her lip has grown over the back of the piercing.  She advises she started a new job and they do not allow facial piercings so she needs the piercing removed.  She has attempted to remove the piercing herself without success.  Tetanus up-to-date.  No known injury or trauma.  Patient denies fever, chills, headache, dizziness, lightheadedness, unilateral extremity weakness, numbness, tingling, or other complaints.      Review of prior external notes (non-ED) -and- Review of prior external test results outside of this encounter:   November 2023: Urgent care office visit.  Patient seen for fever, nasal congestion, fatigue, cough, and generalized bodyaches.  Patient diagnosed with lower respiratory infection.      PAST MEDICAL HISTORY  Active Ambulatory Problems     Diagnosis Date Noted    Alcohol abuse 08/15/2019    Depression 08/15/2019     Resolved Ambulatory Problems     Diagnosis Date Noted    No Resolved Ambulatory Problems     Past Medical History:   Diagnosis Date    ASCUS of cervix with negative high risk HPV 09/05/2014    LGSIL on Pap smear of cervix 03/12/2014         PAST SURGICAL HISTORY  Past Surgical History:   Procedure Laterality Date    APPENDECTOMY           FAMILY HISTORY  Family History   Problem Relation Age of Onset    No Known Problems  Mother     Diabetes Father     Hyperlipidemia Father     No Known Problems Brother          SOCIAL HISTORY  Social History     Socioeconomic History    Marital status: Single   Tobacco Use    Smoking status: Former     Current packs/day: 5.00     Average packs/day: 5.0 packs/day for 3.0 years (15.0 ttl pk-yrs)     Types: Cigarettes    Smokeless tobacco: Never   Vaping Use    Vaping status: Every Day    Substances: Nicotine, Flavoring    Devices: Disposable   Substance and Sexual Activity    Alcohol use: Yes     Comment: EVERY WEEKEND     Drug use: No    Sexual activity: Yes     Partners: Male     Birth control/protection: OCP         ALLERGIES  Azithromycin, Iodinated contrast media, and Penicillins      REVIEW OF SYSTEMS  Included in HPI  All systems reviewed and negative except for those discussed in HPI.      PHYSICAL EXAM    I have reviewed the triage vital signs and nursing notes.    ED Triage Vitals   Temp Heart Rate Resp BP SpO2   04/01/24 1927 04/01/24 1927 04/01/24 1927 04/01/24 1932 04/01/24 1927   98.2 °F (36.8 °C) 106 18 136/93 96 %      Temp src Heart Rate Source Patient Position BP Location FiO2 (%)   04/01/24 1927 04/01/24 1927 04/01/24 1932 04/01/24 1932 --   Tympanic Monitor Sitting Right arm        GENERAL: not distressed  HENT: nares patent  Head/neck/ face are symmetric without gross deformity or swelling  Small barbell piercing above upper lip, there is no surrounding cellulitis.  The back of the barbell is under the inside of the upper lip.  EYES: no scleral icterus  CV: regular rhythm, regular rate with intact distal pulses  RESPIRATORY: normal effort and no respiratory distress  ABDOMEN: soft and non-tender  MUSCULOSKELETAL: no deformity  NEURO: alert and appropriate, moves all extremities, follows commands  SKIN: warm, dry    Vital signs and nursing notes reviewed.      LAB RESULTS  No results found for this or any previous visit (from the past 24 hour(s)).      RADIOLOGY  No Radiology Exams  Resulted Within Past 24 Hours      MEDICATIONS GIVEN IN ER  Medications   lidocaine (XYLOCAINE) 1 % injection 10 mL (10 mL Injection Given by Other 4/1/24 2151)   clindamycin (CLEOCIN) capsule 300 mg (300 mg Oral Given 4/1/24 2256)   chlorhexidine (PERIDEX) 0.12 % solution 15 mL (15 mL Mouth/Throat Given 4/1/24 0379)           OUTPATIENT MEDICATION MANAGEMENT:  No current Epic-ordered facility-administered medications on file.     Current Outpatient Medications Ordered in Epic   Medication Sig Dispense Refill    chlorhexidine (PERIDEX) 0.12 % solution Apply 15 mL to the mouth or throat 2 (Two) Times a Day for 7 days. Swish and spit, do not swallow 210 mL 0    clindamycin (CLEOCIN) 300 MG capsule Take 1 capsule by mouth 3 (Three) Times a Day for 7 days. 20 capsule 0         PROCEDURES  Foreign Body Removal - Embedded    Date/Time: 4/1/2024 10:00 PM    Performed by: May Hernandez APRN  Authorized by: Addy Broderick MD    Consent:     Consent obtained:  Verbal    Consent given by:  Patient    Risks, benefits, and alternatives were discussed: yes      Risks discussed:  Bleeding, infection, pain, worsening of condition, incomplete removal, nerve damage and poor cosmetic result    Alternatives discussed:  No treatment, alternative treatment, observation, referral and delayed treatment  Universal protocol:     Procedure explained and questions answered to patient or proxy's satisfaction: yes      Patient identity confirmed:  Verbally with patient  Location:     Location:  Mouth    Mouth location:  Upper inner lip    Depth:  Subcutaneous    Tendon involvement:  None  Pre-procedure details:     Imaging:  None    Neurovascular status: intact      Preparation: Patient was prepped and draped in usual sterile fashion    Anesthesia:     Anesthesia method:  Local infiltration    Local anesthetic:  Lidocaine 1% w/o epi  Procedure type:     Procedure complexity:  Simple  Procedure details:     Incision length:  1 cm     Localization method:  Visualized    Removal mechanism:  Forceps    Foreign bodies recovered:  1    Description:  Small barell    Intact foreign body removal: yes    Post-procedure details:     Neurovascular status: intact      Confirmation:  No additional foreign bodies on visualization    Skin closure:  None    Dressing:  Open (no dressing)    Procedure completion:  Tolerated          PROGRESS, DATA ANALYSIS, CONSULTS, AND MEDICAL DECISION MAKING  ORDERS PLACED DURING THIS VISIT:  Orders Placed This Encounter   Procedures    Foreign Body Removal       All labs have been independently interpreted by me.  All radiology studies have been reviewed by me. All EKG's have been independently viewed by me.  Discussion below represents my analysis of pertinent findings related to patient's condition, differential diagnosis, treatment plan and final disposition.    Differential diagnosis includes but is not limited to:   Cellulitis, abscess, foreign body, skin avulsion, etc.    ED Course/Progress Notes/MDM:  ED Course as of 04/01/24 2243 Mon Apr 01, 2024 2027 I discussed the case with Dr. Broderick and they agree to evaluate the patient at the bedside.    [AR]   2235 The patient was reexamined.  They have had symptomatic improvement during their ED stay.  I discussed today's findings with the patient, explaining the pertinent positives and negatives from today's visit, and the plan of care.  Discussed plan for discharge as there is no emergent indication for admission.  Discussed limitation of the ED work-up and that this is to rule out life-threatening emergencies but that they could require further testing as determined by their primary care and or any referred specialist patient is agreeable and understands need for follow-up and repeat exam/testing.  Patient is aware that discharge does not mean there is nothing wrong, indicates no emergency is present, and that they must continue their care with their primary care  physician and/or any referred specialist.  They were given appropriate follow-up with their primary care physician and/or specialist.  I had an extensive discussion on the expected clinical course and return precautions.  Patient understands to return to the emergency department for continuation, worsening, or new symptoms.  I answered any of the patient's questions. Patient was discharged home in a stable condition.     [AR]      ED Course User Index  [AR] May Hernandez APRN     Discharge instructions:  You have been given emergency department evaluation.  This evaluation is intended to rule out life-threatening conditions.  Is not a complete evaluation.  You could require further testing as determined by your primary care physician or any referred specialist.  Please follow-up with all doctors that you are referred to.  Please be sure to take your prescribed medications and follow any specific instructions in the discharge instructions.  Please follow-up with your primary care physician within 48 hours.  Please have your primary care provider recheck your blood pressure.  Swish and spit.  Peridex, do not swallow.  It is a good idea to take a probiotic or eat yogurt while taking clindamycin.  Please return to the emergency department if you experience chest pain, shortness of breath, abdominal pain, fever greater than 102, intractable vomiting.  Please return to the emergency department if your symptoms continue or worsen, or if you begin to experience any other concerning symptom.      MDM:  Patient is a 28-year-old female who presents emergency department with complaints of a complication related to her upper lip piercing.  She has attempted to remove the piercing however the inside has healed and the back of the piercing is not visual.  I was able to remove the piercing by making a 1 cm incision to the inside of the upper lip and removing the piercing with forceps.  Patient tolerated well.  Patient observed  for 30 minutes prior to discharge to ensure bleeding would not continue.  Tetanus is up-to-date.  Patient will be discharged home, she is agreeable.  Patient given clindamycin and Peridex.  Strict return precautions given.      COMPLEXITY OF CARE  Admission was considered but after careful review of the patient's presentation, physical examination, diagnostic results, and response to treatment the patient may be safely discharged with outpatient follow-up.        DIAGNOSIS  Final diagnoses:   Foreign body (FB) in soft tissue         DISPOSITION  ED Disposition       ED Disposition   Discharge    Condition   Stable    Comment   --                    Please note that portions of this document were completed with a voice recognition program.    Note Disclaimer: At [facility], we believe that sharing information builds trust and better relationships. You are receiving this note because you recently visited [facility]. It is possible you will see health information before a provider has talked with you about it. This kind of information can be easy to misunderstand. To help you fully understand what it means for your health, we urge you to discuss this note with your provider.     May Hernandez, NATHALIE  04/02/24 0545

## 2024-04-26 ENCOUNTER — HOSPITAL ENCOUNTER (EMERGENCY)
Facility: HOSPITAL | Age: 29
Discharge: HOME | End: 2024-04-26
Attending: EMERGENCY MEDICINE
Payer: COMMERCIAL

## 2024-04-26 ENCOUNTER — APPOINTMENT (OUTPATIENT)
Dept: CT IMAGING | Facility: HOSPITAL | Age: 29
End: 2024-04-26
Payer: COMMERCIAL

## 2024-04-26 VITALS
WEIGHT: 158 LBS | BODY MASS INDEX: 23.95 KG/M2 | HEART RATE: 85 BPM | HEIGHT: 68 IN | TEMPERATURE: 97.5 F | OXYGEN SATURATION: 98 % | SYSTOLIC BLOOD PRESSURE: 131 MMHG | DIASTOLIC BLOOD PRESSURE: 102 MMHG | RESPIRATION RATE: 16 BRPM

## 2024-04-26 DIAGNOSIS — N12 PYELONEPHRITIS: Primary | ICD-10-CM

## 2024-04-26 DIAGNOSIS — N28.1 BILATERAL RENAL CYSTS: ICD-10-CM

## 2024-04-26 LAB
ALBUMIN SERPL-MCNC: 3.9 G/DL (ref 3.5–5.2)
ALBUMIN/GLOB SERPL: 1.1 G/DL
ALP SERPL-CCNC: 121 U/L (ref 39–117)
ALT SERPL W P-5'-P-CCNC: 59 U/L (ref 1–33)
ANION GAP SERPL CALCULATED.3IONS-SCNC: 12 MMOL/L (ref 5–15)
AST SERPL-CCNC: 51 U/L (ref 1–32)
BACTERIA UR QL AUTO: ABNORMAL /HPF
BASOPHILS # BLD AUTO: 0.04 10*3/MM3 (ref 0–0.2)
BASOPHILS NFR BLD AUTO: 0.4 % (ref 0–1.5)
BILIRUB SERPL-MCNC: 0.7 MG/DL (ref 0–1.2)
BILIRUB UR QL STRIP: NEGATIVE
BUN SERPL-MCNC: 6 MG/DL (ref 6–20)
BUN/CREAT SERPL: 9.7 (ref 7–25)
CALCIUM SPEC-SCNC: 9.5 MG/DL (ref 8.6–10.5)
CHLORIDE SERPL-SCNC: 98 MMOL/L (ref 98–107)
CLARITY UR: ABNORMAL
CO2 SERPL-SCNC: 24 MMOL/L (ref 22–29)
COLOR UR: ABNORMAL
CREAT SERPL-MCNC: 0.62 MG/DL (ref 0.57–1)
DEPRECATED RDW RBC AUTO: 42.6 FL (ref 37–54)
EGFRCR SERPLBLD CKD-EPI 2021: 123.8 ML/MIN/1.73
EOSINOPHIL # BLD AUTO: 0.1 10*3/MM3 (ref 0–0.4)
EOSINOPHIL NFR BLD AUTO: 0.9 % (ref 0.3–6.2)
ERYTHROCYTE [DISTWIDTH] IN BLOOD BY AUTOMATED COUNT: 11.9 % (ref 12.3–15.4)
GLOBULIN UR ELPH-MCNC: 3.6 GM/DL
GLUCOSE SERPL-MCNC: 97 MG/DL (ref 65–99)
GLUCOSE UR STRIP-MCNC: NEGATIVE MG/DL
HCG SERPL QL: NEGATIVE
HCT VFR BLD AUTO: 38.8 % (ref 34–46.6)
HGB BLD-MCNC: 13.6 G/DL (ref 12–15.9)
HGB UR QL STRIP.AUTO: ABNORMAL
HOLD SPECIMEN: NORMAL
HOLD SPECIMEN: NORMAL
HYALINE CASTS UR QL AUTO: ABNORMAL /LPF
IMM GRANULOCYTES # BLD AUTO: 0.04 10*3/MM3 (ref 0–0.05)
IMM GRANULOCYTES NFR BLD AUTO: 0.4 % (ref 0–0.5)
KETONES UR QL STRIP: ABNORMAL
LEUKOCYTE ESTERASE UR QL STRIP.AUTO: ABNORMAL
LIPASE SERPL-CCNC: 32 U/L (ref 13–60)
LYMPHOCYTES # BLD AUTO: 1.1 10*3/MM3 (ref 0.7–3.1)
LYMPHOCYTES NFR BLD AUTO: 9.7 % (ref 19.6–45.3)
MCH RBC QN AUTO: 34.8 PG (ref 26.6–33)
MCHC RBC AUTO-ENTMCNC: 35.1 G/DL (ref 31.5–35.7)
MCV RBC AUTO: 99.2 FL (ref 79–97)
MONOCYTES # BLD AUTO: 0.85 10*3/MM3 (ref 0.1–0.9)
MONOCYTES NFR BLD AUTO: 7.5 % (ref 5–12)
NEUTROPHILS NFR BLD AUTO: 81.1 % (ref 42.7–76)
NEUTROPHILS NFR BLD AUTO: 9.17 10*3/MM3 (ref 1.7–7)
NITRITE UR QL STRIP: NEGATIVE
NRBC BLD AUTO-RTO: 0 /100 WBC (ref 0–0.2)
PH UR STRIP.AUTO: 6 [PH] (ref 5–8)
PLATELET # BLD AUTO: 125 10*3/MM3 (ref 140–450)
PMV BLD AUTO: 10 FL (ref 6–12)
POTASSIUM SERPL-SCNC: 3.6 MMOL/L (ref 3.5–5.2)
PROT SERPL-MCNC: 7.5 G/DL (ref 6–8.5)
PROT UR QL STRIP: ABNORMAL
RBC # BLD AUTO: 3.91 10*6/MM3 (ref 3.77–5.28)
RBC # UR STRIP: ABNORMAL /HPF
REF LAB TEST METHOD: ABNORMAL
SODIUM SERPL-SCNC: 134 MMOL/L (ref 136–145)
SP GR UR STRIP: 1.02 (ref 1–1.03)
SQUAMOUS #/AREA URNS HPF: ABNORMAL /HPF
UROBILINOGEN UR QL STRIP: ABNORMAL
WBC # UR STRIP: ABNORMAL /HPF
WBC NRBC COR # BLD AUTO: 11.3 10*3/MM3 (ref 3.4–10.8)
WHOLE BLOOD HOLD COAG: NORMAL
WHOLE BLOOD HOLD SPECIMEN: NORMAL

## 2024-04-26 PROCEDURE — 84703 CHORIONIC GONADOTROPIN ASSAY: CPT | Performed by: EMERGENCY MEDICINE

## 2024-04-26 PROCEDURE — 99284 EMERGENCY DEPT VISIT MOD MDM: CPT

## 2024-04-26 PROCEDURE — 85025 COMPLETE CBC W/AUTO DIFF WBC: CPT | Performed by: PHYSICIAN ASSISTANT

## 2024-04-26 PROCEDURE — 74176 CT ABD & PELVIS W/O CONTRAST: CPT

## 2024-04-26 PROCEDURE — 96365 THER/PROPH/DIAG IV INF INIT: CPT

## 2024-04-26 PROCEDURE — 83690 ASSAY OF LIPASE: CPT | Performed by: PHYSICIAN ASSISTANT

## 2024-04-26 PROCEDURE — 25810000003 SODIUM CHLORIDE 0.9 % SOLUTION: Performed by: PHYSICIAN ASSISTANT

## 2024-04-26 PROCEDURE — 81001 URINALYSIS AUTO W/SCOPE: CPT | Performed by: PHYSICIAN ASSISTANT

## 2024-04-26 PROCEDURE — 80053 COMPREHEN METABOLIC PANEL: CPT | Performed by: PHYSICIAN ASSISTANT

## 2024-04-26 PROCEDURE — 25010000002 ONDANSETRON PER 1 MG: Performed by: PHYSICIAN ASSISTANT

## 2024-04-26 PROCEDURE — 96361 HYDRATE IV INFUSION ADD-ON: CPT

## 2024-04-26 PROCEDURE — 25010000002 KETOROLAC TROMETHAMINE PER 15 MG: Performed by: PHYSICIAN ASSISTANT

## 2024-04-26 PROCEDURE — 96375 TX/PRO/DX INJ NEW DRUG ADDON: CPT

## 2024-04-26 PROCEDURE — 25010000002 CEFTRIAXONE PER 250 MG: Performed by: EMERGENCY MEDICINE

## 2024-04-26 RX ORDER — SODIUM CHLORIDE 0.9 % (FLUSH) 0.9 %
10 SYRINGE (ML) INJECTION AS NEEDED
Status: DISCONTINUED | OUTPATIENT
Start: 2024-04-26 | End: 2024-04-26 | Stop reason: HOSPADM

## 2024-04-26 RX ORDER — CEFUROXIME AXETIL 500 MG/1
500 TABLET ORAL 2 TIMES DAILY
Qty: 14 TABLET | Refills: 0 | Status: SHIPPED | OUTPATIENT
Start: 2024-04-26 | End: 2024-05-03

## 2024-04-26 RX ORDER — KETOROLAC TROMETHAMINE 30 MG/ML
30 INJECTION, SOLUTION INTRAMUSCULAR; INTRAVENOUS ONCE
Status: COMPLETED | OUTPATIENT
Start: 2024-04-26 | End: 2024-04-26

## 2024-04-26 RX ORDER — ONDANSETRON 2 MG/ML
4 INJECTION INTRAMUSCULAR; INTRAVENOUS ONCE
Status: COMPLETED | OUTPATIENT
Start: 2024-04-26 | End: 2024-04-26

## 2024-04-26 RX ORDER — HYDROCODONE BITARTRATE AND ACETAMINOPHEN 5; 325 MG/1; MG/1
1 TABLET ORAL EVERY 6 HOURS PRN
Qty: 12 TABLET | Refills: 0 | Status: SHIPPED | OUTPATIENT
Start: 2024-04-26 | End: 2024-04-29

## 2024-04-26 RX ORDER — ONDANSETRON 4 MG/1
4 TABLET, ORALLY DISINTEGRATING ORAL EVERY 8 HOURS PRN
Qty: 20 TABLET | Refills: 0 | Status: SHIPPED | OUTPATIENT
Start: 2024-04-26 | End: 2024-05-03

## 2024-04-26 RX ADMIN — KETOROLAC TROMETHAMINE 30 MG: 30 INJECTION, SOLUTION INTRAMUSCULAR at 11:38

## 2024-04-26 RX ADMIN — ONDANSETRON 4 MG: 2 INJECTION INTRAMUSCULAR; INTRAVENOUS at 11:38

## 2024-04-26 RX ADMIN — SODIUM CHLORIDE 1000 ML: 9 INJECTION, SOLUTION INTRAVENOUS at 11:37

## 2024-04-26 RX ADMIN — CEFTRIAXONE SODIUM 1000 MG: 1 INJECTION, POWDER, FOR SOLUTION INTRAMUSCULAR; INTRAVENOUS at 13:28

## 2024-04-26 NOTE — ED PROVIDER NOTES
EMERGENCY DEPARTMENT MD ATTESTATION NOTE    Room Number:  18/18  PCP: Provider, No Known  Independent Historians: Patient    HPI:  A complete HPI/ROS/PMH/PSH/SH/FH are unobtainable due to: None    Chronic or social conditions impacting patient care (Social Determinants of Health): None      Context: Adriel Armenta is a 28 y.o. 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  She has had some nausea but no vomiting.  She denies any blood in the urine.  She notes that on previous ED visit last month she had discovery of renal stones.  She is concerned that perhaps she is passing a kidney stone.      Review of prior external notes (non-ED) -and- Review of prior external test results outside of this encounter: I independently reviewed the family medicine progress note from May 28, 2020.          PHYSICAL EXAM    I have reviewed the triage vital signs and nursing notes.    ED Triage Vitals   Temp Heart Rate Resp BP SpO2   04/26/24 1039 04/26/24 1039 04/26/24 1039 04/26/24 1130 04/26/24 1039   97.5 °F (36.4 °C) 103 16 (!) 138/106 98 %      Temp src Heart Rate Source Patient Position BP Location FiO2 (%)   -- -- -- -- --              Physical Exam  GENERAL: alert, appears somewhat uncomfortable but no acute distress  SKIN: Warm, dry  HENT: Normocephalic, atraumatic  EYES: no scleral icterus  CV: regular rhythm, regular rate  RESPIRATORY: normal effort, lungs clear  ABDOMEN: soft, nondistended, mild tenderness to the right hemiabdomen and right CVA region.  No peritoneal features elicited.  MUSCULOSKELETAL: no deformity  NEURO: alert, moves all extremities, follows commands            MEDICATIONS GIVEN IN ER  Medications   ketorolac (TORADOL) injection 30 mg (30 mg Intravenous Given 4/26/24 1138)   ondansetron (ZOFRAN) injection 4 mg (4 mg Intravenous Given 4/26/24 1138)   sodium chloride 0.9 % bolus 1,000 mL (0 mL Intravenous Stopped  4/26/24 1243)   cefTRIAXone (ROCEPHIN) 1,000 mg in sodium chloride 0.9 % 100 mL MBP (0 mg Intravenous Stopped 4/26/24 1410)         ORDERS PLACED DURING THIS VISIT:  Orders Placed This Encounter   Procedures    CT Abdomen Pelvis Without Contrast    [place] Draw    Comprehensive Metabolic Panel    Lipase    Urinalysis With Microscopic If Indicated (No Culture) - Urine, Clean Catch    CBC Auto Differential    Urinalysis, Microscopic Only - Urine, Clean Catch    hCG, Serum, Qualitative    CBC & Differential    Green Top (Gel)    Lavender Top    Gold Top - SST    Light Blue Top         PROCEDURES  Procedures            PROGRESS, DATA ANALYSIS, CONSULTS, AND MEDICAL DECISION MAKING  All labs have been independently interpreted by me.  All radiology studies have been reviewed by me. All EKG's have been independently viewed and interpreted by me.  Discussion below represents my analysis of pertinent findings related to patient's condition, differential diagnosis, treatment plan and final disposition.    Differential diagnosis includes but is not limited to kidney stone, pyelonephritis, UTI, bowel obstruction, pancreatitis.    Clinical Scores:                   ED Course as of 04/26/24 1942 Fri Apr 26, 2024   1216 Blood, UA(!): Large (3+) [DC]   1217 Leukocytes, UA(!): Large (3+) [DC]   1219 Bacteria, UA(!): 4+ [DC]   1219 WBC, UA(!): Too Numerous to Count [DC]   1219 Squamous Epithelial Cells, UA(!): 21-30 [DC]   1432 I reviewed all the test results with the patient at the bedside.  I offered to keep her in the observation unit today for IV antibiotics and urology consultation regarding the CT changes observed on our report today.  However she declines that recommendation and says that she would much rather prefer to go home because she thinks that she will rest much better at home than she would here.  Truthfully she is nontoxic-appearing.  She is afebrile.  She does not have any vomiting.  I think it is reasonable for  "her to go home on a course of oral antibiotics and follow-up in the urology office soon as possible for further evaluation of her renal cyst condition.  Will discuss with her the usual \"return to ER\" instructions prior to discharge.  She agrees to call the first urology office today to schedule an urgent follow-up appointment. [MARY]      ED Course User Index  [DC] Angel Gupta PA  [MARY] Rene Camacho MD       MDM:   I independently interpreted the CT abdomen pelvis without contrast study and my findings are: No bowel obstruction, no free air.  There are bilateral renal cysts and bilateral renal stones.    I reviewed all the test results with the patient at the bedside.  Her urinalysis is certainly confirmatory for UTI/pyelonephritis.  There are changes of interval enlargement around the right kidney which I suspect are related to the active infection.  I discussed the disposition plan options with the patient at length and recommended that she may benefit from a period of time in the observation unit for IV antibiotics and consult with urology.  However she preferred to discharge home, stating that she would rest much better in her own home and bed that she would here at the hospital.  I think that is a very reasonable conclusion on her part.  She is afebrile and nontoxic-appearing at this time.  She is not vomiting.  I think it is reasonable for her to discharge home after dose of IV antibiotics and continue on a course of oral antibiotics with plan to follow-up in the outpatient urology office for further care needs.  She is agreeable to that plan.  I did discuss with her the usual \"return to ER\" instructions prior to discharge as well.    COMPLEXITY OF CARE  Admission was considered but after careful review of the patient's presentation, physical examination, diagnostic results, and response to treatment the patient may be safely discharged with outpatient follow-up.    Please note that portions of this " document were completed with a voice recognition program.    Note Disclaimer: At [facility], we believe that sharing information builds trust and better relationships. You are receiving this note because you recently visited [facility]. It is possible you will see health information before a provider has talked with you about it. This kind of information can be easy to misunderstand. To help you fully understand what it means for your health, we urge you to discuss this note with your provider.         Rene Camacho MD  04/26/24 1942

## 2024-04-26 NOTE — ED NOTES
Patient to ER via car from home for low abd pain and flank pain x a few days  Was seen her recently dx with blood in urine

## 2024-04-26 NOTE — Clinical Note
[facility] EMERGENCY DEPARTMENT  [address] [facility] [address]  Phone: [phone]    Adriel Armenta was seen and treated in our emergency department on 4/26/2024.  She may return to work on 04/29/2024.         Thank you for choosing [facility].    Rene Camacho MD

## 2024-04-26 NOTE — ED PROVIDER NOTES
EMERGENCY DEPARTMENT ENCOUNTER      Room Number:  18/18  PCP: Provider, No Known  Independent Historians: Patient and Family  Patient Care Team:  Provider, No Known as PCP - General       HPI:  Chief Complaint: Flank pain    A complete HPI/ROS/PMH/PSH/SH/FH are unobtainable due to: None    Chronic or social conditions impacting patient care (Social Determinants of Health): None      Context: Adriel Armenta is a 28 y.o. female with a PMH significant for hydrocalycosis who presents to the ED c/o acute right flank pain.  The patient was evaluated in this emergency room approximately 1 month ago for similar symptoms and was diagnosed with a kidney stone.  Symptoms improved until 3 days ago when she started feeling similar pain.  She admits to associated nausea without vomiting.  No fever, chills.  Symptoms have progressed over the course of 3 days and have been waxing and waning in intensity.  States that when she lays flat the symptoms moved from the right flank to bilateral flank and diffusely across the abdomen.  No changes in urination or bowel habits.  Tylenol has not helped.      Upon review of prior external notes (non-ED) -and- Review of prior external test results outside of this encounter it appears the patient was evaluated in the office with OB/GYN for well woman exam on 10/22/2019.  The patient had a normal urine culture on 3/19/2024 and a normal hCG on that date.      PAST MEDICAL HISTORY  Active Ambulatory Problems     Diagnosis Date Noted    Alcohol abuse 08/15/2019    Depression 08/15/2019     Resolved Ambulatory Problems     Diagnosis Date Noted    No Resolved Ambulatory Problems     Past Medical History:   Diagnosis Date    ASCUS of cervix with negative high risk HPV 09/05/2014    Hydrocalycosis     LGSIL on Pap smear of cervix 03/12/2014         PAST SURGICAL HISTORY  Past Surgical History:   Procedure Laterality Date    APPENDECTOMY           FAMILY HISTORY  Family History   Problem Relation Age  of Onset    No Known Problems Mother     Diabetes Father     Hyperlipidemia Father     No Known Problems Brother          SOCIAL HISTORY  Social History     Socioeconomic History    Marital status: Single   Tobacco Use    Smoking status: Former     Current packs/day: 5.00     Average packs/day: 5.0 packs/day for 3.0 years (15.0 ttl pk-yrs)     Types: Cigarettes    Smokeless tobacco: Never   Vaping Use    Vaping status: Every Day    Substances: Nicotine, Flavoring    Devices: Disposable   Substance and Sexual Activity    Alcohol use: Yes     Comment: EVERY WEEKEND     Drug use: No    Sexual activity: Yes     Partners: Male     Birth control/protection: OCP         ALLERGIES  Azithromycin, Iodinated contrast media, and Penicillins      REVIEW OF SYSTEMS  Included in HPI  All systems reviewed and negative except for those discussed in HPI.      PHYSICAL EXAM    I have reviewed the triage vital signs and nursing notes.    ED Triage Vitals [04/26/24 1039]   Temp Heart Rate Resp BP SpO2   97.5 °F (36.4 °C) 103 16 -- 98 %      Temp src Heart Rate Source Patient Position BP Location FiO2 (%)   -- -- -- -- --       Physical Exam  Constitutional:       General: She is not in acute distress.     Appearance: She is well-developed.   HENT:      Head: Normocephalic and atraumatic.   Eyes:      General: No scleral icterus.     Conjunctiva/sclera: Conjunctivae normal.   Neck:      Trachea: No tracheal deviation.   Cardiovascular:      Rate and Rhythm: Normal rate and regular rhythm.   Pulmonary:      Effort: Pulmonary effort is normal.      Breath sounds: Normal breath sounds.   Abdominal:      Palpations: Abdomen is soft.      Tenderness: There is no abdominal tenderness. There is no right CVA tenderness or left CVA tenderness.   Musculoskeletal:         General: No deformity.      Cervical back: Normal range of motion.   Lymphadenopathy:      Cervical: No cervical adenopathy.   Skin:     General: Skin is warm and dry.    Neurological:      Mental Status: She is alert and oriented to person, place, and time.   Psychiatric:         Behavior: Behavior normal.         Vital signs and nursing notes reviewed.      PPE: I wore a surgical mask throughout my encounters with the pt. I performed hand hygiene on entry into the pt room and upon exit.     LAB RESULTS  No results found for this or any previous visit (from the past 24 hour(s)).      RADIOLOGY  No Radiology Exams Resulted Within Past 24 Hours      MEDICATIONS GIVEN IN ER  Medications   ketorolac (TORADOL) injection 30 mg (30 mg Intravenous Given 4/26/24 1138)   ondansetron (ZOFRAN) injection 4 mg (4 mg Intravenous Given 4/26/24 1138)   sodium chloride 0.9 % bolus 1,000 mL (0 mL Intravenous Stopped 4/26/24 1243)   cefTRIAXone (ROCEPHIN) 1,000 mg in sodium chloride 0.9 % 100 mL MBP (0 mg Intravenous Stopped 4/26/24 1410)         ORDERS PLACED DURING THIS VISIT:  Orders Placed This Encounter   Procedures    CT Abdomen Pelvis Without Contrast    [place] Draw    Comprehensive Metabolic Panel    Lipase    Urinalysis With Microscopic If Indicated (No Culture) - Urine, Clean Catch    CBC Auto Differential    Urinalysis, Microscopic Only - Urine, Clean Catch    hCG, Serum, Qualitative    CBC & Differential    Green Top (Gel)    Lavender Top    Gold Top - SST    Light Blue Top         OUTPATIENT MEDICATION MANAGEMENT:  No current Epic-ordered facility-administered medications on file.     Current Outpatient Medications Ordered in Epic   Medication Sig Dispense Refill    cefuroxime (CEFTIN) 500 MG tablet Take 1 tablet by mouth 2 (Two) Times a Day for 7 days. 14 tablet 0    HYDROcodone-acetaminophen (NORCO) 5-325 MG per tablet Take 1 tablet by mouth Every 6 (Six) Hours As Needed for Severe Pain for up to 3 days. 12 tablet 0    ondansetron ODT (ZOFRAN-ODT) 4 MG disintegrating tablet Place 1 tablet on the tongue Every 8 (Eight) Hours As Needed for Nausea or Vomiting for up to 7 days. 20 tablet  "0             PROGRESS, DATA ANALYSIS, CONSULTS, AND MEDICAL DECISION MAKING  All labs have been independently interpreted by me.  All radiology studies have been reviewed by me. All EKG's have been independently viewed and interpreted by me.  Discussion below represents my analysis of pertinent findings related to patient's condition, differential diagnosis, treatment plan and final disposition.      DIFFERENTIAL DIAGNOSIS INCLUDE BUT NOT LIMITED TO:     Differential diagnosis includes but is not limited to:  - Hepatobiliary pathology such as cholecystitis, cholangitis, and symptomatic cholelithiasis  - Pancreatitis  - Dyspepsia  - Small bowel obstruction  - Appendicitis  - Diverticulitis  - UTI including pyelonephritis  - Ureteral stone  - Zoster  - Colitis, including infectious and ischemic  - Atypical ACS    Clinical Scores: N/A      ED Course as of 04/29/24 0132   Fri Apr 26, 2024   1216 Blood, UA(!): Large (3+) [DC]   1217 Leukocytes, UA(!): Large (3+) [DC]   1219 Bacteria, UA(!): 4+ [DC]   1219 WBC, UA(!): Too Numerous to Count [DC]   1219 Squamous Epithelial Cells, UA(!): 21-30 [DC]   1432 I reviewed all the test results with the patient at the bedside.  I offered to keep her in the observation unit today for IV antibiotics and urology consultation regarding the CT changes observed on our report today.  However she declines that recommendation and says that she would much rather prefer to go home because she thinks that she will rest much better at home than she would here.  Truthfully she is nontoxic-appearing.  She is afebrile.  She does not have any vomiting.  I think it is reasonable for her to go home on a course of oral antibiotics and follow-up in the urology office soon as possible for further evaluation of her renal cyst condition.  Will discuss with her the usual \"return to ER\" instructions prior to discharge.  She agrees to call the first urology office today to schedule an urgent follow-up " appointment. [MARY]      ED Course User Index  [DC] Angel Gupta PA  [MARY] Rene Camacho MD           AS OF 01:32 EDT VITALS:    BP - (!) 131/102  HR - 85  TEMP - 97.5 °F (36.4 °C)  O2 SATS - 98%    COMPLEXITY OF CARE  Admission was considered but after careful review of the patient's presentation, physical examination, diagnostic results, and response to treatment the patient may be safely discharged with outpatient follow-up.      DIAGNOSIS  Final diagnoses:   Pyelonephritis   Bilateral renal cysts         DISPOSITION  ED Disposition       ED Disposition   Discharge    Condition   Stable    Comment   --                Please note that portions of this document were completed with a voice recognition program.    Note Disclaimer: At [facility], we believe that sharing information builds trust and better relationships. You are receiving this note because you recently visited [facility]. It is possible you will see health information before a provider has talked with you about it. This kind of information can be easy to misunderstand. To help you fully understand what it means for your health, we urge you to discuss this note with your provider.         Angel Gupta PA  04/29/24 0132

## 2024-04-26 NOTE — DISCHARGE INSTRUCTIONS
Take antibiotic as directed until completed.  Drink plenty of water as tolerated.  Must follow-up in the urology office as soon as possible for further evaluation as we discussed.  Please return to the emergency room for any worsening pain, fevers, weakness, nausea, vomiting, difficulties urinating or any other concerns.

## 2024-04-26 NOTE — ED NOTES
"Pt to ED with father with c/o bilat flank pain x days, states was here 2 weeks ago for same and told she had kidney stones up \"high in my kidneys\". Pt returning for bilat flank pain and bilat lower abd pain, + nausea today   "

## 2024-05-23 ENCOUNTER — TRANSCRIBE ORDERS (OUTPATIENT)
Dept: ADMINISTRATIVE | Facility: HOSPITAL | Age: 29
End: 2024-05-23
Payer: COMMERCIAL

## 2024-05-23 DIAGNOSIS — N28.89 RENAL MASS: Primary | ICD-10-CM

## 2024-10-08 ENCOUNTER — APPOINTMENT (OUTPATIENT)
Dept: CT IMAGING | Facility: HOSPITAL | Age: 29
DRG: 689 | End: 2024-10-08
Payer: MEDICAID

## 2024-10-08 ENCOUNTER — HOSPITAL ENCOUNTER (INPATIENT)
Facility: HOSPITAL | Age: 29
LOS: 3 days | Discharge: HOME | DRG: 689 | End: 2024-10-13
Attending: EMERGENCY MEDICINE | Admitting: STUDENT IN AN ORGANIZED HEALTH CARE EDUCATION/TRAINING PROGRAM
Payer: MEDICAID

## 2024-10-08 DIAGNOSIS — K85.20 ALCOHOL-INDUCED ACUTE PANCREATITIS, UNSPECIFIED COMPLICATION STATUS: ICD-10-CM

## 2024-10-08 DIAGNOSIS — F14.10 COCAINE ABUSE: ICD-10-CM

## 2024-10-08 DIAGNOSIS — F10.10 ALCOHOL ABUSE: ICD-10-CM

## 2024-10-08 DIAGNOSIS — N39.0 UTI (URINARY TRACT INFECTION) WITH PYURIA: ICD-10-CM

## 2024-10-08 DIAGNOSIS — N17.9 AKI (ACUTE KIDNEY INJURY): Primary | ICD-10-CM

## 2024-10-08 DIAGNOSIS — R11.2 NAUSEA AND VOMITING, UNSPECIFIED VOMITING TYPE: ICD-10-CM

## 2024-10-08 PROBLEM — K76.0 HEPATIC STEATOSIS: Status: ACTIVE | Noted: 2024-10-08

## 2024-10-08 PROBLEM — E87.1 HYPONATREMIA: Status: ACTIVE | Noted: 2024-10-08

## 2024-10-08 PROBLEM — F10.90 ALCOHOL USE DISORDER: Status: ACTIVE | Noted: 2024-10-08

## 2024-10-08 LAB
ALBUMIN SERPL-MCNC: 5.4 G/DL (ref 3.5–5.2)
ALBUMIN/GLOB SERPL: 1 G/DL
ALP SERPL-CCNC: 140 U/L (ref 39–117)
ALT SERPL W P-5'-P-CCNC: 41 U/L (ref 1–33)
AMPHET+METHAMPHET UR QL: NEGATIVE
ANION GAP SERPL CALCULATED.3IONS-SCNC: 22 MMOL/L (ref 5–15)
AST SERPL-CCNC: 65 U/L (ref 1–32)
BACTERIA UR QL AUTO: ABNORMAL /HPF
BARBITURATES UR QL SCN: NEGATIVE
BASOPHILS # BLD AUTO: 0.07 10*3/MM3 (ref 0–0.2)
BASOPHILS NFR BLD AUTO: 0.6 % (ref 0–1.5)
BENZODIAZ UR QL SCN: NEGATIVE
BILIRUB SERPL-MCNC: 1.5 MG/DL (ref 0–1.2)
BILIRUB UR QL STRIP: NEGATIVE
BUN SERPL-MCNC: 28 MG/DL (ref 6–20)
BUN/CREAT SERPL: 24.8 (ref 7–25)
CALCIUM SPEC-SCNC: 12.1 MG/DL (ref 8.6–10.5)
CANNABINOIDS SERPL QL: NEGATIVE
CHLORIDE SERPL-SCNC: 87 MMOL/L (ref 98–107)
CLARITY UR: ABNORMAL
CO2 SERPL-SCNC: 22 MMOL/L (ref 22–29)
COCAINE UR QL: POSITIVE
COLOR UR: YELLOW
CREAT SERPL-MCNC: 1.13 MG/DL (ref 0.57–1)
D-LACTATE SERPL-SCNC: 1.5 MMOL/L (ref 0.5–2)
DEPRECATED RDW RBC AUTO: 53 FL (ref 37–54)
EGFRCR SERPLBLD CKD-EPI 2021: 67.7 ML/MIN/1.73
EOSINOPHIL # BLD AUTO: 0.34 10*3/MM3 (ref 0–0.4)
EOSINOPHIL NFR BLD AUTO: 2.9 % (ref 0.3–6.2)
ERYTHROCYTE [DISTWIDTH] IN BLOOD BY AUTOMATED COUNT: 14.9 % (ref 12.3–15.4)
ETHANOL BLD-MCNC: <10 MG/DL (ref 0–10)
ETHANOL UR QL: <0.01 %
FENTANYL UR-MCNC: NEGATIVE NG/ML
GLOBULIN UR ELPH-MCNC: 5.5 GM/DL
GLUCOSE SERPL-MCNC: 91 MG/DL (ref 65–99)
GLUCOSE UR STRIP-MCNC: NEGATIVE MG/DL
HCG SERPL QL: NEGATIVE
HCT VFR BLD AUTO: 43.7 % (ref 34–46.6)
HGB BLD-MCNC: 15.1 G/DL (ref 12–15.9)
HGB UR QL STRIP.AUTO: ABNORMAL
HYALINE CASTS UR QL AUTO: ABNORMAL /LPF
IMM GRANULOCYTES # BLD AUTO: 0.04 10*3/MM3 (ref 0–0.05)
IMM GRANULOCYTES NFR BLD AUTO: 0.3 % (ref 0–0.5)
KETONES UR QL STRIP: ABNORMAL
LEUKOCYTE ESTERASE UR QL STRIP.AUTO: ABNORMAL
LIPASE SERPL-CCNC: 917 U/L (ref 13–60)
LYMPHOCYTES # BLD AUTO: 1.62 10*3/MM3 (ref 0.7–3.1)
LYMPHOCYTES NFR BLD AUTO: 13.8 % (ref 19.6–45.3)
MAGNESIUM SERPL-MCNC: 2.3 MG/DL (ref 1.6–2.6)
MCH RBC QN AUTO: 33.6 PG (ref 26.6–33)
MCHC RBC AUTO-ENTMCNC: 34.6 G/DL (ref 31.5–35.7)
MCV RBC AUTO: 97.1 FL (ref 79–97)
METHADONE UR QL SCN: NEGATIVE
MONOCYTES # BLD AUTO: 0.56 10*3/MM3 (ref 0.1–0.9)
MONOCYTES NFR BLD AUTO: 4.8 % (ref 5–12)
NEUTROPHILS NFR BLD AUTO: 77.6 % (ref 42.7–76)
NEUTROPHILS NFR BLD AUTO: 9.1 10*3/MM3 (ref 1.7–7)
NITRITE UR QL STRIP: POSITIVE
NRBC BLD AUTO-RTO: 0 /100 WBC (ref 0–0.2)
OPIATES UR QL: NEGATIVE
OXYCODONE UR QL SCN: NEGATIVE
PH UR STRIP.AUTO: 6 [PH] (ref 5–8)
PLATELET # BLD AUTO: 162 10*3/MM3 (ref 140–450)
PMV BLD AUTO: 9.7 FL (ref 6–12)
POTASSIUM SERPL-SCNC: 3.8 MMOL/L (ref 3.5–5.2)
PROT SERPL-MCNC: 10.9 G/DL (ref 6–8.5)
PROT UR QL STRIP: ABNORMAL
RBC # BLD AUTO: 4.5 10*6/MM3 (ref 3.77–5.28)
RBC # UR STRIP: ABNORMAL /HPF
REF LAB TEST METHOD: ABNORMAL
SODIUM SERPL-SCNC: 131 MMOL/L (ref 136–145)
SP GR UR STRIP: 1.02 (ref 1–1.03)
SQUAMOUS #/AREA URNS HPF: ABNORMAL /HPF
UROBILINOGEN UR QL STRIP: ABNORMAL
WBC # UR STRIP: ABNORMAL /HPF
WBC NRBC COR # BLD AUTO: 11.73 10*3/MM3 (ref 3.4–10.8)

## 2024-10-08 PROCEDURE — 80307 DRUG TEST PRSMV CHEM ANLYZR: CPT | Performed by: EMERGENCY MEDICINE

## 2024-10-08 PROCEDURE — 25810000003 SODIUM CHLORIDE 0.9 % SOLUTION: Performed by: EMERGENCY MEDICINE

## 2024-10-08 PROCEDURE — 25010000002 MORPHINE PER 10 MG: Performed by: EMERGENCY MEDICINE

## 2024-10-08 PROCEDURE — 85025 COMPLETE CBC W/AUTO DIFF WBC: CPT | Performed by: EMERGENCY MEDICINE

## 2024-10-08 PROCEDURE — 87086 URINE CULTURE/COLONY COUNT: CPT | Performed by: EMERGENCY MEDICINE

## 2024-10-08 PROCEDURE — 82077 ASSAY SPEC XCP UR&BREATH IA: CPT | Performed by: EMERGENCY MEDICINE

## 2024-10-08 PROCEDURE — 87088 URINE BACTERIA CULTURE: CPT | Performed by: EMERGENCY MEDICINE

## 2024-10-08 PROCEDURE — G0378 HOSPITAL OBSERVATION PER HR: HCPCS

## 2024-10-08 PROCEDURE — 83605 ASSAY OF LACTIC ACID: CPT | Performed by: EMERGENCY MEDICINE

## 2024-10-08 PROCEDURE — 83690 ASSAY OF LIPASE: CPT | Performed by: EMERGENCY MEDICINE

## 2024-10-08 PROCEDURE — 87186 SC STD MICRODIL/AGAR DIL: CPT | Performed by: EMERGENCY MEDICINE

## 2024-10-08 PROCEDURE — 81001 URINALYSIS AUTO W/SCOPE: CPT | Performed by: EMERGENCY MEDICINE

## 2024-10-08 PROCEDURE — 25010000002 THIAMINE HCL 200 MG/2ML SOLUTION: Performed by: EMERGENCY MEDICINE

## 2024-10-08 PROCEDURE — 74176 CT ABD & PELVIS W/O CONTRAST: CPT

## 2024-10-08 PROCEDURE — 25010000002 ONDANSETRON PER 1 MG: Performed by: EMERGENCY MEDICINE

## 2024-10-08 PROCEDURE — 25010000002 HYDROMORPHONE PER 4 MG: Performed by: STUDENT IN AN ORGANIZED HEALTH CARE EDUCATION/TRAINING PROGRAM

## 2024-10-08 PROCEDURE — 84703 CHORIONIC GONADOTROPIN ASSAY: CPT | Performed by: EMERGENCY MEDICINE

## 2024-10-08 PROCEDURE — 83735 ASSAY OF MAGNESIUM: CPT | Performed by: EMERGENCY MEDICINE

## 2024-10-08 PROCEDURE — 25010000002 CEFTRIAXONE PER 250 MG: Performed by: EMERGENCY MEDICINE

## 2024-10-08 PROCEDURE — 80053 COMPREHEN METABOLIC PANEL: CPT | Performed by: EMERGENCY MEDICINE

## 2024-10-08 PROCEDURE — 99285 EMERGENCY DEPT VISIT HI MDM: CPT

## 2024-10-08 RX ORDER — AMOXICILLIN 250 MG
2 CAPSULE ORAL 2 TIMES DAILY PRN
Status: DISCONTINUED | OUTPATIENT
Start: 2024-10-08 | End: 2024-10-13 | Stop reason: HOSPADM

## 2024-10-08 RX ORDER — DIPHENOXYLATE HYDROCHLORIDE AND ATROPINE SULFATE 2.5; .025 MG/1; MG/1
1 TABLET ORAL DAILY
Status: DISCONTINUED | OUTPATIENT
Start: 2024-10-09 | End: 2024-10-13 | Stop reason: HOSPADM

## 2024-10-08 RX ORDER — ACETAMINOPHEN 160 MG/5ML
650 SOLUTION ORAL EVERY 4 HOURS PRN
Status: DISCONTINUED | OUTPATIENT
Start: 2024-10-08 | End: 2024-10-13 | Stop reason: HOSPADM

## 2024-10-08 RX ORDER — ACETAMINOPHEN 650 MG/1
650 SUPPOSITORY RECTAL EVERY 4 HOURS PRN
Status: DISCONTINUED | OUTPATIENT
Start: 2024-10-08 | End: 2024-10-13 | Stop reason: HOSPADM

## 2024-10-08 RX ORDER — BISACODYL 10 MG
10 SUPPOSITORY, RECTAL RECTAL DAILY PRN
Status: DISCONTINUED | OUTPATIENT
Start: 2024-10-08 | End: 2024-10-13 | Stop reason: HOSPADM

## 2024-10-08 RX ORDER — MORPHINE SULFATE 2 MG/ML
2 INJECTION, SOLUTION INTRAMUSCULAR; INTRAVENOUS ONCE
Status: COMPLETED | OUTPATIENT
Start: 2024-10-08 | End: 2024-10-08

## 2024-10-08 RX ORDER — SODIUM CHLORIDE 0.9 % (FLUSH) 0.9 %
10 SYRINGE (ML) INJECTION AS NEEDED
Status: DISCONTINUED | OUTPATIENT
Start: 2024-10-08 | End: 2024-10-13 | Stop reason: HOSPADM

## 2024-10-08 RX ORDER — ONDANSETRON 2 MG/ML
4 INJECTION INTRAMUSCULAR; INTRAVENOUS ONCE
Status: COMPLETED | OUTPATIENT
Start: 2024-10-08 | End: 2024-10-08

## 2024-10-08 RX ORDER — SODIUM CHLORIDE 0.9 % (FLUSH) 0.9 %
10 SYRINGE (ML) INJECTION EVERY 12 HOURS SCHEDULED
Status: DISCONTINUED | OUTPATIENT
Start: 2024-10-08 | End: 2024-10-13 | Stop reason: HOSPADM

## 2024-10-08 RX ORDER — ONDANSETRON 4 MG/1
4 TABLET, ORALLY DISINTEGRATING ORAL EVERY 6 HOURS PRN
Status: DISCONTINUED | OUTPATIENT
Start: 2024-10-08 | End: 2024-10-13 | Stop reason: HOSPADM

## 2024-10-08 RX ORDER — POLYETHYLENE GLYCOL 3350 17 G/17G
17 POWDER, FOR SOLUTION ORAL DAILY PRN
Status: DISCONTINUED | OUTPATIENT
Start: 2024-10-08 | End: 2024-10-13 | Stop reason: HOSPADM

## 2024-10-08 RX ORDER — PANTOPRAZOLE SODIUM 40 MG/10ML
40 INJECTION, POWDER, LYOPHILIZED, FOR SOLUTION INTRAVENOUS ONCE
Status: COMPLETED | OUTPATIENT
Start: 2024-10-08 | End: 2024-10-08

## 2024-10-08 RX ORDER — ACETAMINOPHEN 325 MG/1
650 TABLET ORAL EVERY 4 HOURS PRN
Status: DISCONTINUED | OUTPATIENT
Start: 2024-10-08 | End: 2024-10-13 | Stop reason: HOSPADM

## 2024-10-08 RX ORDER — SODIUM CHLORIDE 9 MG/ML
40 INJECTION, SOLUTION INTRAVENOUS AS NEEDED
Status: DISCONTINUED | OUTPATIENT
Start: 2024-10-08 | End: 2024-10-13 | Stop reason: HOSPADM

## 2024-10-08 RX ORDER — ONDANSETRON 2 MG/ML
4 INJECTION INTRAMUSCULAR; INTRAVENOUS EVERY 6 HOURS PRN
Status: DISCONTINUED | OUTPATIENT
Start: 2024-10-08 | End: 2024-10-13 | Stop reason: HOSPADM

## 2024-10-08 RX ORDER — HYDROMORPHONE HYDROCHLORIDE 1 MG/ML
0.5 INJECTION, SOLUTION INTRAMUSCULAR; INTRAVENOUS; SUBCUTANEOUS
Status: DISCONTINUED | OUTPATIENT
Start: 2024-10-08 | End: 2024-10-13 | Stop reason: HOSPADM

## 2024-10-08 RX ORDER — BISACODYL 5 MG/1
5 TABLET, DELAYED RELEASE ORAL DAILY PRN
Status: DISCONTINUED | OUTPATIENT
Start: 2024-10-08 | End: 2024-10-13 | Stop reason: HOSPADM

## 2024-10-08 RX ORDER — FOLIC ACID 1 MG/1
1 TABLET ORAL DAILY
Status: DISCONTINUED | OUTPATIENT
Start: 2024-10-09 | End: 2024-10-13 | Stop reason: HOSPADM

## 2024-10-08 RX ORDER — SODIUM CHLORIDE 9 MG/ML
100 INJECTION, SOLUTION INTRAVENOUS CONTINUOUS
Status: ACTIVE | OUTPATIENT
Start: 2024-10-08 | End: 2024-10-09

## 2024-10-08 RX ORDER — NITROGLYCERIN 0.4 MG/1
0.4 TABLET SUBLINGUAL
Status: DISCONTINUED | OUTPATIENT
Start: 2024-10-08 | End: 2024-10-13 | Stop reason: HOSPADM

## 2024-10-08 RX ORDER — THIAMINE HYDROCHLORIDE 100 MG/ML
200 INJECTION, SOLUTION INTRAMUSCULAR; INTRAVENOUS ONCE
Status: COMPLETED | OUTPATIENT
Start: 2024-10-08 | End: 2024-10-08

## 2024-10-08 RX ADMIN — HYDROMORPHONE HYDROCHLORIDE 0.5 MG: 1 INJECTION, SOLUTION INTRAMUSCULAR; INTRAVENOUS; SUBCUTANEOUS at 22:03

## 2024-10-08 RX ADMIN — FOLIC ACID 1 MG: 5 INJECTION, SOLUTION INTRAMUSCULAR; INTRAVENOUS; SUBCUTANEOUS at 19:30

## 2024-10-08 RX ADMIN — CEFTRIAXONE SODIUM 1000 MG: 1 INJECTION, POWDER, FOR SOLUTION INTRAMUSCULAR; INTRAVENOUS at 16:22

## 2024-10-08 RX ADMIN — SODIUM CHLORIDE 100 ML/HR: 9 INJECTION, SOLUTION INTRAVENOUS at 17:36

## 2024-10-08 RX ADMIN — PANTOPRAZOLE SODIUM 40 MG: 40 INJECTION, POWDER, FOR SOLUTION INTRAVENOUS at 15:38

## 2024-10-08 RX ADMIN — THIAMINE HYDROCHLORIDE 200 MG: 100 INJECTION, SOLUTION INTRAMUSCULAR; INTRAVENOUS at 17:36

## 2024-10-08 RX ADMIN — ONDANSETRON 4 MG: 2 INJECTION, SOLUTION INTRAMUSCULAR; INTRAVENOUS at 15:38

## 2024-10-08 RX ADMIN — Medication 10 ML: at 21:07

## 2024-10-08 RX ADMIN — SODIUM CHLORIDE 1000 ML: 9 INJECTION, SOLUTION INTRAVENOUS at 15:38

## 2024-10-08 RX ADMIN — MORPHINE SULFATE 2 MG: 2 INJECTION, SOLUTION INTRAMUSCULAR; INTRAVENOUS at 15:38

## 2024-10-08 NOTE — ED TRIAGE NOTES
Pt to triage via ems from home  Pt called for abd pain x4 days. Pt was drinking alcohol and doing cocaine when the pain started per ems.

## 2024-10-08 NOTE — H&P
"    Patient Name:  Adriel Armenta  YOB: 1995  MRN:  [id number]  Admit Date:  10/8/2024  Patient Care Team:  Provider, No Known as PCP - General      Subjective   History Present Illness     Chief Complaint   Patient presents with    Abdominal Pain       Ms. Armenta is a 28 y.o. former smoker with a history of alcohol and cocaine use disorder that presents to [facility] complaining of abdominal pain.    History of Present Illness  Very pleasant 28-year-old who reports developing severe abdominal pain starting about 3 days ago.  She says that she has been nauseated and \"cannot keep anything down including water.\"  She thinks there may have been a small amount of blood in her vomitus.  Pain is worse with eating and pressing on abdomen.  Patient reports that she drinks alcohol, approximately 1/5-day.  She last quit drinking about 4 days ago, she did feel tremulous last few days however this has resolved and she feels back to baseline now aside from the abdominal pain.    Review of Systems   Constitutional:  Negative for fatigue and fever.   Respiratory:  Negative for cough and shortness of breath.    Cardiovascular:  Negative for chest pain, palpitations and leg swelling.   Gastrointestinal:  Positive for abdominal pain, nausea and vomiting.   Neurological:  Negative for weakness.        Personal History     Past Medical History:   Diagnosis Date    ASCUS of cervix with negative high risk HPV 09/05/2014    Hydrocalycosis     LGSIL on Pap smear of cervix 03/12/2014    HPV POSITIVE     Past Surgical History:   Procedure Laterality Date    APPENDECTOMY       Family History   Problem Relation Age of Onset    No Known Problems Mother     Diabetes Father     Hyperlipidemia Father     No Known Problems Brother      Social History     Tobacco Use    Smoking status: Former     Current packs/day: 5.00     Average packs/day: 5.0 packs/day for 3.0 years (15.0 ttl pk-yrs)     Types: Cigarettes    " Smokeless tobacco: Never   Vaping Use    Vaping status: Every Day    Substances: Nicotine, Flavoring    Devices: Disposable   Substance Use Topics    Alcohol use: Yes     Comment: EVERY WEEKEND     Drug use: Yes     Types: Cocaine(coke)     No current facility-administered medications on file prior to encounter.     No current outpatient medications on file prior to encounter.     Allergies   Allergen Reactions    Azithromycin Hives    Iodinated Contrast Media Hives    Penicillins Hives       Objective    Objective     Vital Signs  Temp:  [98.4 °F (36.9 °C)-98.5 °F (36.9 °C)] 98.4 °F (36.9 °C)  Heart Rate:  [] 83  Resp:  [16-18] 16  BP: (124-144)/() 136/103  SpO2:  [95 %-100 %] 100 %  on   ;   Device (Oxygen Therapy): room air  Body mass index is 22.64 kg/m².    Physical Exam  Constitutional:       General: She is not in acute distress.     Appearance: Normal appearance. She is normal weight. She is not ill-appearing.   HENT:      Head: Normocephalic and atraumatic.      Mouth/Throat:      Mouth: Mucous membranes are moist.      Pharynx: Oropharynx is clear.   Eyes:      Extraocular Movements: Extraocular movements intact.      Conjunctiva/sclera: Conjunctivae normal.      Pupils: Pupils are equal, round, and reactive to light.   Cardiovascular:      Rate and Rhythm: Normal rate and regular rhythm.   Pulmonary:      Effort: Pulmonary effort is normal. No respiratory distress.      Breath sounds: Normal breath sounds. No wheezing.   Abdominal:      General: Abdomen is flat.      Palpations: Abdomen is soft.      Tenderness: There is abdominal tenderness (Mild).   Musculoskeletal:         General: No swelling or tenderness. Normal range of motion.      Right lower leg: No edema.      Left lower leg: No edema.   Skin:     General: Skin is warm and dry.   Neurological:      General: No focal deficit present.      Mental Status: She is alert and oriented to person, place, and time. Mental status is at  baseline.   Psychiatric:         Mood and Affect: Mood normal.         Behavior: Behavior normal.         Thought Content: Thought content normal.         Judgment: Judgment normal.         Results Review:  I reviewed the patient's new clinical results.  I reviewed the patient's new imaging results and agree with the interpretation.  I reviewed the patient's other test results and agree with the interpretation  I personally viewed and interpreted the patient's EKG/Telemetry data  Discussed with ED provider.    Lab Results (last 24 hours)       Procedure Component Value Units Date/Time    CBC & Differential [[id number]]  (Abnormal) Collected: 10/08/24 1538    Specimen: Blood Updated: 10/08/24 1551    Narrative:      The following orders were created for panel order CBC & Differential.  Procedure                               Abnormality         Status                     ---------                               -----------         ------                     CBC Auto Differential[[id number]]        Abnormal            Final result                 Please view results for these tests on the individual orders.    Comprehensive Metabolic Panel [[id number]]  (Abnormal) Collected: 10/08/24 1538    Specimen: Blood Updated: 10/08/24 1627     Glucose 91 mg/dL      BUN 28 mg/dL      Creatinine 1.13 mg/dL      Sodium 131 mmol/L      Potassium 3.8 mmol/L      Chloride 87 mmol/L      CO2 22.0 mmol/L      Calcium 12.1 mg/dL      Total Protein 10.9 g/dL      Albumin 5.4 g/dL      ALT (SGPT) 41 U/L      AST (SGOT) 65 U/L      Alkaline Phosphatase 140 U/L      Total Bilirubin 1.5 mg/dL      Globulin 5.5 gm/dL      A/G Ratio 1.0 g/dL      BUN/Creatinine Ratio 24.8     Anion Gap 22.0 mmol/L      eGFR 67.7 mL/min/1.73     Narrative:      GFR Normal >60  Chronic Kidney Disease <60  Kidney Failure <15      Lipase [[id number]]  (Abnormal) Collected: 10/08/24 1538    Specimen: Blood Updated: 10/08/24 1648     Lipase 917 U/L     hCG, Serum,  Qualitative [[id number]]  (Normal) Collected: 10/08/24 1538    Specimen: Blood Updated: 10/08/24 1607     HCG Qualitative Negative    Urinalysis With Microscopic If Indicated (No Culture) - Urine, Clean Catch [[id number]]  (Abnormal) Collected: 10/08/24 1538    Specimen: Urine, Clean Catch Updated: 10/08/24 1601     Color, UA Yellow     Appearance, UA Turbid     pH, UA 6.0     Specific Gravity, UA 1.018     Glucose, UA Negative     Ketones, UA 80 mg/dL (3+)     Bilirubin, UA Negative     Blood, UA Large (3+)     Protein,  mg/dL (2+)     Leuk Esterase, UA Large (3+)     Nitrite, UA Positive     Urobilinogen, UA 1.0 E.U./dL    Lactic Acid, Plasma [[id number]]  (Normal) Collected: 10/08/24 1538    Specimen: Blood Updated: 10/08/24 1620     Lactate 1.5 mmol/L     Magnesium [[id number]]  (Normal) Collected: 10/08/24 1538    Specimen: Blood Updated: 10/08/24 1627     Magnesium 2.3 mg/dL     Ethanol [[id number]] Collected: 10/08/24 1538    Specimen: Blood Updated: 10/08/24 1627     Ethanol <10 mg/dL      Ethanol % <0.010 %     Urine Drug Screen - Urine, Clean Catch [[id number]]  (Abnormal) Collected: 10/08/24 1538    Specimen: Urine, Clean Catch Updated: 10/08/24 1622     Amphet/Methamphet, Screen Negative     Barbiturates Screen, Urine Negative     Benzodiazepine Screen, Urine Negative     Cocaine Screen, Urine Positive     Opiate Screen Negative     THC, Screen, Urine Negative     Methadone Screen, Urine Negative     Oxycodone Screen, Urine Negative     Fentanyl, Urine Negative    Narrative:      Negative Thresholds Per Drugs Screened:    Amphetamines                 500 ng/ml  Barbiturates                 200 ng/ml  Benzodiazepines              100 ng/ml  Cocaine                      300 ng/ml  Methadone                    300 ng/ml  Opiates                      300 ng/ml  Oxycodone                    100 ng/ml  THC                           50 ng/ml  Fentanyl                       5 ng/ml      The Normal Value for all  drugs tested is negative. This report includes final unconfirmed screening results to be used for medical treatment purposes only. Unconfirmed results must not be used for non-medical purposes such as employment or legal testing. Clinical consideration should be applied to any drug of abuse test, particularly when unconfirmed results are used.            CBC Auto Differential [[id number]]  (Abnormal) Collected: 10/08/24 1538    Specimen: Blood Updated: 10/08/24 1551     WBC 11.73 10*3/mm3      RBC 4.50 10*6/mm3      Hemoglobin 15.1 g/dL      Hematocrit 43.7 %      MCV 97.1 fL      MCH 33.6 pg      MCHC 34.6 g/dL      RDW 14.9 %      RDW-SD 53.0 fl      MPV 9.7 fL      Platelets 162 10*3/mm3      Neutrophil % 77.6 %      Lymphocyte % 13.8 %      Monocyte % 4.8 %      Eosinophil % 2.9 %      Basophil % 0.6 %      Immature Grans % 0.3 %      Neutrophils, Absolute 9.10 10*3/mm3      Lymphocytes, Absolute 1.62 10*3/mm3      Monocytes, Absolute 0.56 10*3/mm3      Eosinophils, Absolute 0.34 10*3/mm3      Basophils, Absolute 0.07 10*3/mm3      Immature Grans, Absolute 0.04 10*3/mm3      nRBC 0.0 /100 WBC     Urinalysis, Microscopic Only - Urine, Clean Catch [[id number]]  (Abnormal) Collected: 10/08/24 1538    Specimen: Urine, Clean Catch Updated: 10/08/24 1601     RBC, UA 3-5 /HPF      WBC, UA Too Numerous to Count /HPF      Bacteria, UA 4+ /HPF      Squamous Epithelial Cells, UA 7-12 /HPF      Hyaline Casts, UA 7-12 /LPF      Methodology Automated Microscopy    Urine Culture - Urine, Urine, Clean Catch [[id number]] Collected: 10/08/24 1538    Specimen: Urine, Clean Catch Updated: 10/08/24 1646            Imaging Results (Last 24 Hours)       Procedure Component Value Units Date/Time    CT Abdomen Pelvis Without Contrast [[id number]] Collected: 10/08/24 1648     Updated: 10/08/24 1657    Narrative:      CT ABDOMEN AND PELVIS WITHOUT IV CONTRAST     HISTORY: Abdominal pain, nausea and vomiting     TECHNIQUE: Radiation dose  reduction techniques were utilized, including  automated exposure control and exposure modulation based on body size.  Axial images were obtained through the abdomen and pelvis without the  administration of IV contrast. Coronal and sagittal reformatted images  were obtained.     COMPARISON: 4/26/2024     FINDINGS:      ABDOMEN:  There is some mild atelectasis in the right lung base. There is diffuse  fatty infiltration of the liver. There is some layering high attenuation  material in the gallbladder which may reflect sludge and/or stones. The  spleen is normal in size. Bilateral renal cysts are identified. Some of  these are smaller. One of them is larger and increased in density  located in the right midpole region likely reflecting a hemorrhagic  cyst. It now measures about 3.7 cm, previously about 2.3 cm. There are  multiple nonobstructing bilateral kidney stones. The adrenal glands are  unremarkable. There is some inflammatory stranding around the head of  the pancreas suggesting pancreatitis. Suggest correlation with lipase  levels. There is no fluid collection or abscess.     PELVIS:  The bladder is unremarkable. There is a 3.3 cm right ovarian cyst. No  free fluid is seen in the pelvis. Appendix not visualized. Colon  unremarkable. No acute bony abnormality       Impression:      1. There is some inflammatory stranding around the head of the pancreas  suggesting acute pancreatitis. There is no fluid collection or abscess  2. There are bilateral nonobstructing kidney stones  3. There are bilateral renal cysts as described     Radiation dose reduction techniques were utilized, including automated  exposure control and exposure modulation based on body size.        This report was finalized on 10/8/2024 4:54 PM by Dr. Jermaine France M.D on Workstation: [id number]                   Telemetry Scan   Final Result           Assessment/Plan     Active Hospital Problems    Diagnosis  POA    **Acute alcoholic  pancreatitis [K85.20]  Yes    Alcohol use disorder [F10.90]  Yes    Cocaine use disorder [F14.10]  Yes    Hepatic steatosis [K76.0]  Yes    Hyponatremia [E87.1]  Yes      Resolved Hospital Problems   No resolved problems to display.       Ms. Armenta is a 28 y.o. former smoker with a history of alcohol and cocaine use disorder who presents with abdominal pain, nausea and vomiting- found to have acute pancreatitis     Acute pancreatitis  - CT c/w pancreatitis, no abscess/fluid collection; lipase 900-repeat labs in a.m.  - start IVF, ok for CLD and ADAT; iv pain meds prn    Alcohol use disorder  Severe heaptic steatosis  Mild elevation in LFTs  - Etoh neg in ED  - last drink 4 days ago-she reports some mild withdrawal symptoms which have subsided  - no e/o withdrawal at this time; low threshold to start CIWA protocol however  - start MVI, thiamine, folate; repeat CMP  - access consult    Asymptomatic bacteria  Hematuria  -UA collected in the ER did show nitrites leuk esterase and bacteria but also 7-12 squamous epithelial cells consistent with contamination, she denies any dysuria or frequency  -DC antibiotics and observe  -Patient reported some hematuria as well, the patient states she is also currently menstruating, will repeat UA in am    Hyponatremia  - related to alcohol use likely  - daily bmp    Cocaine use disorder  - access consult    I discussed the patient's findings and my recommendations with patient.    VTE Prophylaxis - SCDs.  Code Status - Full code.       Swathi Pedro MD  [facility]  10/08/24  22:54 EDT

## 2024-10-08 NOTE — PROGRESS NOTES
Clinical Pharmacy Services: Medication History    Adriel Armenta is a 28 y.o. female presenting to [facility] for   Chief Complaint   Patient presents with    Abdominal Pain       She  has a past medical history of ASCUS of cervix with negative high risk HPV (09/05/2014), Hydrocalycosis, and LGSIL on Pap smear of cervix (03/12/2014).    Allergies as of 10/08/2024 - Reviewed 10/08/2024   Allergen Reaction Noted    Azithromycin Hives 09/11/2012    Iodinated contrast media Hives 09/11/2012    Penicillins Hives 02/24/2014       Medication information was obtained from: Patient   Pharmacy and Phone Number:     Prior to Admission Medications       None              Medication notes: Patient not taking any prescription or OTC medications.     This medication list is complete to the best of my knowledge as of 10/8/2024    Please call if questions.    Eleazar Vick  Medication History Technician   [phone]    10/8/2024 19:09 EDT

## 2024-10-08 NOTE — ED NOTES
.Nursing report ED to floor  Adriel Armenta  28 y.o.  female    HPI :  HPI (Adult)  Stated Reason for Visit: abd pain  History Obtained From: EMS    Chief Complaint  Chief Complaint   Patient presents with    Abdominal Pain       Admitting doctor:   Swathi Pedro MD    Admitting diagnosis:   The primary encounter diagnosis was BAILEY (acute kidney injury). Diagnoses of UTI (urinary tract infection) with pyuria, Nausea and vomiting, unspecified vomiting type, Alcohol-induced acute pancreatitis, unspecified complication status, Cocaine abuse, and Alcohol abuse were also pertinent to this visit.    Code status:   Current Code Status       Date Active Code Status Order ID Comments User Context       10/8/2024 1838 CPR (Attempt to Resuscitate) [id number]  Swathi Pedro MD ED        Question Answer    Code Status (Patient has no pulse and is not breathing) CPR (Attempt to Resuscitate)    Medical Interventions (Patient has pulse or is breathing) Full Support                    Allergies:   Azithromycin, Iodinated contrast media, and Penicillins    Isolation:   No active isolations    Intake and Output    Intake/Output Summary (Last 24 hours) at 10/8/2024 1842  Last data filed at 10/8/2024 1736  Gross per 24 hour   Intake 1100 ml   Output --   Net 1100 ml       Weight:   There were no vitals filed for this visit.    Most recent vitals:   Vitals:    10/08/24 1458 10/08/24 1701   BP: (!) 144/107 131/99   Pulse: 115 91   Resp: 18    Temp: 98.5 °F (36.9 °C)    TempSrc: Oral    SpO2: 98% 95%       Active LDAs/IV Access:   Lines, Drains & Airways       Active LDAs       Name Placement date Placement time Site Days    Peripheral IV 10/08/24 1538 Right Antecubital 10/08/24  1538  Antecubital  less than 1                    Labs (abnormal labs have a star):   Labs Reviewed   COMPREHENSIVE METABOLIC PANEL - Abnormal; Notable for the following components:       Result Value    BUN 28 (*)     Creatinine 1.13 (*)     Sodium 131  (*)     Chloride 87 (*)     Calcium 12.1 (*)     Total Protein 10.9 (*)     Albumin 5.4 (*)     ALT (SGPT) 41 (*)     AST (SGOT) 65 (*)     Alkaline Phosphatase 140 (*)     Total Bilirubin 1.5 (*)     Anion Gap 22.0 (*)     All other components within normal limits    Narrative:     GFR Normal >60  Chronic Kidney Disease <60  Kidney Failure <15     LIPASE - Abnormal; Notable for the following components:    Lipase 917 (*)     All other components within normal limits   URINALYSIS W/ MICROSCOPIC IF INDICATED (NO CULTURE) - Abnormal; Notable for the following components:    Appearance, UA Turbid (*)     Ketones, UA 80 mg/dL (3+) (*)     Blood, UA Large (3+) (*)     Protein,  mg/dL (2+) (*)     Leuk Esterase, UA Large (3+) (*)     Nitrite, UA Positive (*)     All other components within normal limits   URINE DRUG SCREEN - Abnormal; Notable for the following components:    Cocaine Screen, Urine Positive (*)     All other components within normal limits    Narrative:     Negative Thresholds Per Drugs Screened:    Amphetamines                 500 ng/ml  Barbiturates                 200 ng/ml  Benzodiazepines              100 ng/ml  Cocaine                      300 ng/ml  Methadone                    300 ng/ml  Opiates                      300 ng/ml  Oxycodone                    100 ng/ml  THC                           50 ng/ml  Fentanyl                       5 ng/ml      The Normal Value for all drugs tested is negative. This report includes final unconfirmed screening results to be used for medical treatment purposes only. Unconfirmed results must not be used for non-medical purposes such as employment or legal testing. Clinical consideration should be applied to any drug of abuse test, particularly when unconfirmed results are used.           CBC WITH AUTO DIFFERENTIAL - Abnormal; Notable for the following components:    WBC 11.73 (*)     MCV 97.1 (*)     MCH 33.6 (*)     Neutrophil % 77.6 (*)     Lymphocyte %  13.8 (*)     Monocyte % 4.8 (*)     Neutrophils, Absolute 9.10 (*)     All other components within normal limits   URINALYSIS, MICROSCOPIC ONLY - Abnormal; Notable for the following components:    RBC, UA 3-5 (*)     WBC, UA Too Numerous to Count (*)     Bacteria, UA 4+ (*)     Squamous Epithelial Cells, UA 7-12 (*)     All other components within normal limits   HCG, SERUM, QUALITATIVE - Normal   LACTIC ACID, PLASMA - Normal   MAGNESIUM - Normal   URINE CULTURE   ETHANOL   CBC AND DIFFERENTIAL    Narrative:     The following orders were created for panel order CBC & Differential.  Procedure                               Abnormality         Status                     ---------                               -----------         ------                     CBC Auto Differential[[id number]]        Abnormal            Final result                 Please view results for these tests on the individual orders.       EKG:   No orders to display       Meds given in ED:   Medications   sodium chloride 0.9 % flush 10 mL (has no administration in time range)   sodium chloride 0.9 % infusion (100 mL/hr Intravenous New Bag 10/8/24 7006)   folic acid 1 mg in sodium chloride 0.9 % 50 mL IVPB (has no administration in time range)   sodium chloride 0.9 % flush 10 mL (has no administration in time range)   sodium chloride 0.9 % flush 10 mL (has no administration in time range)   sodium chloride 0.9 % infusion 40 mL (has no administration in time range)   ondansetron ODT (ZOFRAN-ODT) disintegrating tablet 4 mg (has no administration in time range)     Or   ondansetron (ZOFRAN) injection 4 mg (has no administration in time range)   melatonin tablet 5 mg (has no administration in time range)   nitroglycerin (NITROSTAT) SL tablet 0.4 mg (has no administration in time range)   acetaminophen (TYLENOL) tablet 650 mg (has no administration in time range)     Or   acetaminophen (TYLENOL) 160 MG/5ML oral solution 650 mg (has no administration in  time range)     Or   acetaminophen (TYLENOL) suppository 650 mg (has no administration in time range)   sennosides-docusate (PERICOLACE) 8.6-50 MG per tablet 2 tablet (has no administration in time range)     And   polyethylene glycol (MIRALAX) packet 17 g (has no administration in time range)     And   bisacodyl (DULCOLAX) EC tablet 5 mg (has no administration in time range)     And   bisacodyl (DULCOLAX) suppository 10 mg (has no administration in time range)   HYDROmorphone (DILAUDID) injection 0.5 mg (has no administration in time range)   sodium chloride 0.9 % bolus 1,000 mL (0 mL Intravenous Stopped 10/8/24 1619)   ondansetron (ZOFRAN) injection 4 mg (4 mg Intravenous Given 10/8/24 1538)   pantoprazole (PROTONIX) injection 40 mg (40 mg Intravenous Given 10/8/24 1538)   morphine injection 2 mg (2 mg Intravenous Given 10/8/24 1538)   cefTRIAXone (ROCEPHIN) 1,000 mg in sodium chloride 0.9 % 100 mL MBP (0 mg Intravenous Stopped 10/8/24 1736)   thiamine (B-1) injection 200 mg (200 mg Intravenous Given 10/8/24 1736)       Imaging results:  CT Abdomen Pelvis Without Contrast    Result Date: 10/8/2024  1. There is some inflammatory stranding around the head of the pancreas suggesting acute pancreatitis. There is no fluid collection or abscess 2. There are bilateral nonobstructing kidney stones 3. There are bilateral renal cysts as described  Radiation dose reduction techniques were utilized, including automated exposure control and exposure modulation based on body size.   This report was finalized on 10/8/2024 4:54 PM by Dr. Jermaine France M.D on Workstation: [id number]       Ambulatory status:   - IND    Social issues:   Social History     Socioeconomic History    Marital status: Single   Tobacco Use    Smoking status: Former     Current packs/day: 5.00     Average packs/day: 5.0 packs/day for 3.0 years (15.0 ttl pk-yrs)     Types: Cigarettes    Smokeless tobacco: Never   Vaping Use    Vaping status: Every Day     Substances: Nicotine, Flavoring    Devices: Disposable   Substance and Sexual Activity    Alcohol use: Yes     Comment: EVERY WEEKEND     Drug use: No    Sexual activity: Yes     Partners: Male     Birth control/protection: OCP       Peripheral Neurovascular  Peripheral Neurovascular (Adult)  Peripheral Neurovascular WDL: WDL    Neuro Cognitive  Neuro Cognitive (Adult)  Cognitive/Neuro/Behavioral WDL: WDL    Learning  Learning Assessment (Adult)  Learning Readiness and Ability: no barriers identified  Education Provided  Person Taught: patient    Respiratory  Respiratory WDL  Respiratory WDL: WDL    Abdominal Pain       Pain Assessments  Pain (Adult)  (0-10) Pain Rating: Rest: 5  (0-10) Pain Rating: Activity: 5  Pain Location: abdomen    Oli Hernandez RN  10/08/24 18:42 EDT

## 2024-10-08 NOTE — ED PROVIDER NOTES
EMERGENCY DEPARTMENT ENCOUNTER    Room Number:  3113/1  PCP: Provider, No Known  Independent Historians: Patient    HPI:  Chief Complaint: had concerns including Abdominal Pain.      A complete HPI/ROS/PMH/PSH/SH/FH are unobtainable due to: None    Chronic or social conditions impacting patient care (Social Determinants of Health): None      Context: Adriel Armenta is a 28 y.o. female with a medical history of alcohol abuse on record review the patient denies any chronic medical problems who presents to the ED c/o acute epigastric discomfort with nausea and vomiting for the past 4 days.  Patient thought at first she made a sandwich for breakfast that did not taste well.  However, patient's symptoms have continued for 4 days. Today patient unable to tolerate even water without vomiting.  Patient denies diarrhea, dysuria, chest pain, shortness of breath.  Patient does endorse some hematuria and some blood in her emesis.        Review of prior external notes (non-ED) -and- Review of prior external test results outside of this encounter: Patient with a CT of her abdomen in April of this year.  Patient was noted to have a right renal cyst that was possibly hemorrhagic or infected with findings of pyelonephritis with perinephric edema.  Patient also noted to have severe hepatic steatosis.    Prescription drug monitoring program review:     N/A    PAST MEDICAL HISTORY  Active Ambulatory Problems     Diagnosis Date Noted    Alcohol abuse 08/15/2019    Depression 08/15/2019     Resolved Ambulatory Problems     Diagnosis Date Noted    No Resolved Ambulatory Problems     Past Medical History:   Diagnosis Date    ASCUS of cervix with negative high risk HPV 09/05/2014    Hydrocalycosis     LGSIL on Pap smear of cervix 03/12/2014         PAST SURGICAL HISTORY  Past Surgical History:   Procedure Laterality Date    APPENDECTOMY           FAMILY HISTORY  Family History   Problem Relation Age of Onset    No Known Problems Mother      Diabetes Father     Hyperlipidemia Father     No Known Problems Brother          SOCIAL HISTORY  Social History     Socioeconomic History    Marital status: Single   Tobacco Use    Smoking status: Former     Current packs/day: 5.00     Average packs/day: 5.0 packs/day for 3.0 years (15.0 ttl pk-yrs)     Types: Cigarettes    Smokeless tobacco: Never   Vaping Use    Vaping status: Every Day    Substances: Nicotine, Flavoring    Devices: Disposable   Substance and Sexual Activity    Alcohol use: Yes     Comment: EVERY WEEKEND     Drug use: Yes     Types: Cocaine(coke)    Sexual activity: Yes     Partners: Male     Birth control/protection: OCP         ALLERGIES  Azithromycin, Iodinated contrast media, and Penicillins        REVIEW OF SYSTEMS  Review of Systems  Included in HPI  All systems reviewed and negative except for those discussed in HPI.      PHYSICAL EXAM    I have reviewed the triage vital signs and nursing notes.    ED Triage Vitals [10/08/24 1458]   Temp Heart Rate Resp BP SpO2   98.5 °F (36.9 °C) 115 18 (!) 144/107 98 %      Temp src Heart Rate Source Patient Position BP Location FiO2 (%)   Oral -- -- -- --       Physical Exam  GENERAL: Cooperative and conversant but in pain female, alert, no acute distress  SKIN: Warm, dry  HENT: Normocephalic, atraumatic, dry mucous membranes  EYES: no scleral icterus, EOMI  CV: regular rhythm, regular rate  RESPIRATORY: normal effort, lungs clear, no wheezing  ABDOMEN: soft, epigastric tenderness to palpation with no rebound and no guarding, nondistended  MUSCULOSKELETAL: no deformity  NEURO: alert, moves all extremities, follows commands                                                                   LAB RESULTS  Recent Results (from the past 24 hour(s))   Comprehensive Metabolic Panel    Collection Time: 10/08/24  3:38 PM    Specimen: Blood   Result Value Ref Range    Glucose 91 65 - 99 mg/dL    BUN 28 (H) 6 - 20 mg/dL    Creatinine 1.13 (H) 0.57 - 1.00 mg/dL     Sodium 131 (L) 136 - 145 mmol/L    Potassium 3.8 3.5 - 5.2 mmol/L    Chloride 87 (L) 98 - 107 mmol/L    CO2 22.0 22.0 - 29.0 mmol/L    Calcium 12.1 (H) 8.6 - 10.5 mg/dL    Total Protein 10.9 (H) 6.0 - 8.5 g/dL    Albumin 5.4 (H) 3.5 - 5.2 g/dL    ALT (SGPT) 41 (H) 1 - 33 U/L    AST (SGOT) 65 (H) 1 - 32 U/L    Alkaline Phosphatase 140 (H) 39 - 117 U/L    Total Bilirubin 1.5 (H) 0.0 - 1.2 mg/dL    Globulin 5.5 gm/dL    A/G Ratio 1.0 g/dL    BUN/Creatinine Ratio 24.8 7.0 - 25.0    Anion Gap 22.0 (H) 5.0 - 15.0 mmol/L    eGFR 67.7 >60.0 mL/min/1.73   Lipase    Collection Time: 10/08/24  3:38 PM    Specimen: Blood   Result Value Ref Range    Lipase 917 (H) 13 - 60 U/L   hCG, Serum, Qualitative    Collection Time: 10/08/24  3:38 PM    Specimen: Blood   Result Value Ref Range    HCG Qualitative Negative Negative   Urinalysis With Microscopic If Indicated (No Culture) - Urine, Clean Catch    Collection Time: 10/08/24  3:38 PM    Specimen: Urine, Clean Catch   Result Value Ref Range    Color, UA Yellow Yellow, Straw    Appearance, UA Turbid (A) Clear    pH, UA 6.0 5.0 - 8.0    Specific Gravity, UA 1.018 1.005 - 1.030    Glucose, UA Negative Negative    Ketones, UA 80 mg/dL (3+) (A) Negative    Bilirubin, UA Negative Negative    Blood, UA Large (3+) (A) Negative    Protein,  mg/dL (2+) (A) Negative    Leuk Esterase, UA Large (3+) (A) Negative    Nitrite, UA Positive (A) Negative    Urobilinogen, UA 1.0 E.U./dL 0.2 - 1.0 E.U./dL   Lactic Acid, Plasma    Collection Time: 10/08/24  3:38 PM    Specimen: Blood   Result Value Ref Range    Lactate 1.5 0.5 - 2.0 mmol/L   Magnesium    Collection Time: 10/08/24  3:38 PM    Specimen: Blood   Result Value Ref Range    Magnesium 2.3 1.6 - 2.6 mg/dL   Ethanol    Collection Time: 10/08/24  3:38 PM    Specimen: Blood   Result Value Ref Range    Ethanol <10 0 - 10 mg/dL    Ethanol % <0.010 %   Urine Drug Screen - Urine, Clean Catch    Collection Time: 10/08/24  3:38 PM    Specimen:  Urine, Clean Catch   Result Value Ref Range    Amphet/Methamphet, Screen Negative Negative    Barbiturates Screen, Urine Negative Negative    Benzodiazepine Screen, Urine Negative Negative    Cocaine Screen, Urine Positive (A) Negative    Opiate Screen Negative Negative    THC, Screen, Urine Negative Negative    Methadone Screen, Urine Negative Negative    Oxycodone Screen, Urine Negative Negative    Fentanyl, Urine Negative Negative   CBC Auto Differential    Collection Time: 10/08/24  3:38 PM    Specimen: Blood   Result Value Ref Range    WBC 11.73 (H) 3.40 - 10.80 10*3/mm3    RBC 4.50 3.77 - 5.28 10*6/mm3    Hemoglobin 15.1 12.0 - 15.9 g/dL    Hematocrit 43.7 34.0 - 46.6 %    MCV 97.1 (H) 79.0 - 97.0 fL    MCH 33.6 (H) 26.6 - 33.0 pg    MCHC 34.6 31.5 - 35.7 g/dL    RDW 14.9 12.3 - 15.4 %    RDW-SD 53.0 37.0 - 54.0 fl    MPV 9.7 6.0 - 12.0 fL    Platelets 162 140 - 450 10*3/mm3    Neutrophil % 77.6 (H) 42.7 - 76.0 %    Lymphocyte % 13.8 (L) 19.6 - 45.3 %    Monocyte % 4.8 (L) 5.0 - 12.0 %    Eosinophil % 2.9 0.3 - 6.2 %    Basophil % 0.6 0.0 - 1.5 %    Immature Grans % 0.3 0.0 - 0.5 %    Neutrophils, Absolute 9.10 (H) 1.70 - 7.00 10*3/mm3    Lymphocytes, Absolute 1.62 0.70 - 3.10 10*3/mm3    Monocytes, Absolute 0.56 0.10 - 0.90 10*3/mm3    Eosinophils, Absolute 0.34 0.00 - 0.40 10*3/mm3    Basophils, Absolute 0.07 0.00 - 0.20 10*3/mm3    Immature Grans, Absolute 0.04 0.00 - 0.05 10*3/mm3    nRBC 0.0 0.0 - 0.2 /100 WBC   Urinalysis, Microscopic Only - Urine, Clean Catch    Collection Time: 10/08/24  3:38 PM    Specimen: Urine, Clean Catch   Result Value Ref Range    RBC, UA 3-5 (A) None Seen, 0-2 /HPF    WBC, UA Too Numerous to Count (A) None Seen, 0-2 /HPF    Bacteria, UA 4+ (A) None Seen /HPF    Squamous Epithelial Cells, UA 7-12 (A) None Seen, 0-2 /HPF    Hyaline Casts, UA 7-12 None Seen /LPF    Methodology Automated Microscopy          RADIOLOGY  CT Abdomen Pelvis Without Contrast    Result Date: 10/8/2024  CT  ABDOMEN AND PELVIS WITHOUT IV CONTRAST  HISTORY: Abdominal pain, nausea and vomiting  TECHNIQUE: Radiation dose reduction techniques were utilized, including automated exposure control and exposure modulation based on body size. Axial images were obtained through the abdomen and pelvis without the administration of IV contrast. Coronal and sagittal reformatted images were obtained.  COMPARISON: 4/26/2024  FINDINGS:  ABDOMEN: There is some mild atelectasis in the right lung base. There is diffuse fatty infiltration of the liver. There is some layering high attenuation material in the gallbladder which may reflect sludge and/or stones. The spleen is normal in size. Bilateral renal cysts are identified. Some of these are smaller. One of them is larger and increased in density located in the right midpole region likely reflecting a hemorrhagic cyst. It now measures about 3.7 cm, previously about 2.3 cm. There are multiple nonobstructing bilateral kidney stones. The adrenal glands are unremarkable. There is some inflammatory stranding around the head of the pancreas suggesting pancreatitis. Suggest correlation with lipase levels. There is no fluid collection or abscess.  PELVIS: The bladder is unremarkable. There is a 3.3 cm right ovarian cyst. No free fluid is seen in the pelvis. Appendix not visualized. Colon unremarkable. No acute bony abnormality      1. There is some inflammatory stranding around the head of the pancreas suggesting acute pancreatitis. There is no fluid collection or abscess 2. There are bilateral nonobstructing kidney stones 3. There are bilateral renal cysts as described  Radiation dose reduction techniques were utilized, including automated exposure control and exposure modulation based on body size.   This report was finalized on 10/8/2024 4:54 PM by Dr. Jermaine France M.D on Workstation: [id number]         MEDICATIONS GIVEN IN ER  Medications   sodium chloride 0.9 % flush 10 mL (has no  administration in time range)   sodium chloride 0.9 % infusion (100 mL/hr Intravenous Currently Infusing 10/8/24 2322)   sodium chloride 0.9 % flush 10 mL (10 mL Intravenous Given 10/8/24 2107)   sodium chloride 0.9 % flush 10 mL (has no administration in time range)   sodium chloride 0.9 % infusion 40 mL (has no administration in time range)   ondansetron ODT (ZOFRAN-ODT) disintegrating tablet 4 mg (has no administration in time range)     Or   ondansetron (ZOFRAN) injection 4 mg (has no administration in time range)   melatonin tablet 5 mg (has no administration in time range)   nitroglycerin (NITROSTAT) SL tablet 0.4 mg (has no administration in time range)   acetaminophen (TYLENOL) tablet 650 mg (has no administration in time range)     Or   acetaminophen (TYLENOL) 160 MG/5ML oral solution 650 mg (has no administration in time range)     Or   acetaminophen (TYLENOL) suppository 650 mg (has no administration in time range)   sennosides-docusate (PERICOLACE) 8.6-50 MG per tablet 2 tablet (has no administration in time range)     And   polyethylene glycol (MIRALAX) packet 17 g (has no administration in time range)     And   bisacodyl (DULCOLAX) EC tablet 5 mg (has no administration in time range)     And   bisacodyl (DULCOLAX) suppository 10 mg (has no administration in time range)   HYDROmorphone (DILAUDID) injection 0.5 mg (0.5 mg Intravenous Given 10/8/24 2203)   multivitamin (THERAGRAN) tablet 1 tablet (has no administration in time range)   thiamine (VITAMIN B-1) tablet 100 mg (has no administration in time range)   folic acid (FOLVITE) tablet 1 mg (has no administration in time range)   sodium chloride 0.9 % bolus 1,000 mL (0 mL Intravenous Stopped 10/8/24 1619)   ondansetron (ZOFRAN) injection 4 mg (4 mg Intravenous Given 10/8/24 1538)   pantoprazole (PROTONIX) injection 40 mg (40 mg Intravenous Given 10/8/24 1538)   morphine injection 2 mg (2 mg Intravenous Given 10/8/24 1538)   cefTRIAXone (ROCEPHIN) 1,000  mg in sodium chloride 0.9 % 100 mL MBP (0 mg Intravenous Stopped 10/8/24 1736)   folic acid 1 mg in sodium chloride 0.9 % 50 mL IVPB (0 mg Intravenous Stopped 10/8/24 2001)   thiamine (B-1) injection 200 mg (200 mg Intravenous Given 10/8/24 1736)         ORDERS PLACED DURING THIS VISIT:  Orders Placed This Encounter   Procedures    Urine Culture - Urine,    CT Abdomen Pelvis Without Contrast    Comprehensive Metabolic Panel    Lipase    hCG, Serum, Qualitative    Urinalysis With Microscopic If Indicated (No Culture) - Urine, Clean Catch    Lactic Acid, Plasma    Magnesium    Ethanol    Urine Drug Screen - Urine, Clean Catch    CBC Auto Differential    Urinalysis, Microscopic Only - Urine, Clean Catch    CBC Auto Differential    Magnesium    Phosphorus    Lipase    Comprehensive Metabolic Panel    Urinalysis With Microscopic If Indicated (No Culture) - Urine, Clean Catch    Diet: Liquid; Clear Liquid; Fluid Consistency: Thin (IDDSI 0)    Vital Signs    Intake & Output    Weigh Patient    Oral Care    Place Sequential Compression Device    Maintain Sequential Compression Device    Maintain IV Access    Telemetry - Place Orders & Notify Provider of Results When Patient Experiences Acute Chest Pain, Dysrhythmia or Respiratory Distress    May Be Off Telemetry for Tests    Opioid Administration - Document EtCO2 and / or SpO2 With Each Set of Vitals & Any Change in Patient Status    Opioid Administration - Notify Provider Hypercapnic Monitoring    Opioid Administration - Continuous Pulse Oximetry (SpO2)    Code Status and Medical Interventions: CPR (Attempt to Resuscitate); Full Support    [facility] (on-call MD unless specified) Details    Inpatient Access Center Consult    Telemetry Scan    Insert Peripheral IV    Insert Peripheral IV    Initiate Observation Status    CBC & Differential         OUTPATIENT MEDICATION MANAGEMENT:  Current Facility-Administered Medications Ordered in Epic   Medication Dose Route Frequency Provider  Last Rate Last Admin    acetaminophen (TYLENOL) tablet 650 mg  650 mg Oral Q4H PRN Swathi Pedro MD        Or    acetaminophen (TYLENOL) 160 MG/5ML oral solution 650 mg  650 mg Oral Q4H PRN Swathi Pedro MD        Or    acetaminophen (TYLENOL) suppository 650 mg  650 mg Rectal Q4H PRN Swathi Pedro MD        sennosides-docusate (PERICOLACE) 8.6-50 MG per tablet 2 tablet  2 tablet Oral BID PRN Swathi Pedro MD        And    polyethylene glycol (MIRALAX) packet 17 g  17 g Oral Daily PRN Swathi Pedro MD        And    bisacodyl (DULCOLAX) EC tablet 5 mg  5 mg Oral Daily PRN Swathi Pedro MD        And    bisacodyl (DULCOLAX) suppository 10 mg  10 mg Rectal Daily PRN Swathi Pedro MD        [START ON 10/9/2024] folic acid (FOLVITE) tablet 1 mg  1 mg Oral Daily Swathi Pedro MD        HYDROmorphone (DILAUDID) injection 0.5 mg  0.5 mg Intravenous Q2H PRN Swathi Pedro MD   0.5 mg at 10/08/24 2203    melatonin tablet 5 mg  5 mg Oral Nightly PRN Swathi Pedro MD        [START ON 10/9/2024] multivitamin (THERAGRAN) tablet 1 tablet  1 tablet Oral Daily Swathi Pedro MD        nitroglycerin (NITROSTAT) SL tablet 0.4 mg  0.4 mg Sublingual Q5 Min PRN Swathi Pedro MD        ondansetron ODT (ZOFRAN-ODT) disintegrating tablet 4 mg  4 mg Oral Q6H PRN Swathi Pedro MD        Or    ondansetron (ZOFRAN) injection 4 mg  4 mg Intravenous Q6H PRSwathi Headley MD        sodium chloride 0.9 % flush 10 mL  10 mL Intravenous PRN Melania Lawrence MD        sodium chloride 0.9 % flush 10 mL  10 mL Intravenous Q12H Swathi Pedro MD   10 mL at 10/08/24 2107    sodium chloride 0.9 % flush 10 mL  10 mL Intravenous PRN Swathi Pedro MD        sodium chloride 0.9 % infusion 40 mL  40 mL Intravenous PRN Swathi Pedro MD        sodium chloride 0.9 % infusion  100 mL/hr Intravenous Continuous Swathi Pedro  mL/hr at 10/08/24 7892 100  mL/hr at 10/08/24 2322    [START ON 10/9/2024] thiamine (VITAMIN B-1) tablet 100 mg  100 mg Oral Daily Swathi Pedro MD         No current [facility]-ordered outpatient medications on file.         PROCEDURES  Procedures            PROGRESS, DATA ANALYSIS, CONSULTS, AND MEDICAL DECISION MAKING  All labs have been independently interpreted by me.  All radiology studies have been reviewed by me. All EKG's have been independently viewed and interpreted by me.  Discussion below represents my analysis of pertinent findings related to patient's condition, differential diagnosis, treatment plan and final disposition.    Differential diagnosis includes but is not limited to   The differential diagnosis for this patient includes gastritis, PUD, H. Pylori infection, atypical ACS, pancreatitis, cholecystitis/ biliary colic, esophagitis, esophageal spasm, appendicitis, sbo, pneumonia/pneumothorax, hepatitis, AAA, Aortic dissection, bowel perforation, and malignancy. Abdominal exam is without peritoneal signs. There is no evidence of acute abdomen at this time. The patient is well appearing. I have a low suspicion for vascular catastrophe, bowel obstruction or viscus perforation. Plan serum labs, urinalysis, pain control and IV antiemetic, also plan on urine toxicology and EtOH, and serial reassessment.  Will reassess for indications for imaging.    Clinical Scores:                  ED Course as of 10/08/24 2337   Tue Oct 08, 2024   1824 I discussed patient's case with Brandy sidhu for A who is amenable to admitting to Dr. Pedro [AR]      ED Course User Index  [AR] Melania Lawrence MD             AS OF 23:37 EDT VITALS:    BP - (!) 136/103  HR - 83  TEMP - 98.4 °F (36.9 °C) (Oral)  O2 SATS - 100%      COMPLEXITY OF CARE  The patient requires admission.    DIAGNOSIS  Final diagnoses:   BAILEY (acute kidney injury)   UTI (urinary tract infection) with pyuria   Nausea and vomiting, unspecified vomiting type   Alcohol-induced  acute pancreatitis, unspecified complication status   Cocaine abuse   Alcohol abuse         DISPOSITION  ED Disposition       ED Disposition   Decision to Admit    Condition   --    Comment   Level of Care: Telemetry [5]   Diagnosis: Acute alcoholic pancreatitis [385858]   Admitting Physician: KEYSHAWN MCKEE [[id number]]   Attending Physician: KEYSHAWN MCKEE [[id number]]   Is patient appropriate for Inpatient Observation Unit?: No [0]                  Please note that portions of this document were completed with a voice recognition program.    Note Disclaimer: At [facility], we believe that sharing information builds trust and better relationships. You are receiving this note because you recently visited [facility]. It is possible you will see health information before a provider has talked with you about it. This kind of information can be easy to misunderstand. To help you fully understand what it means for your health, we urge you to discuss this note with your provider.         Melania Lawrence MD  10/08/24 0317

## 2024-10-09 LAB
ALBUMIN SERPL-MCNC: 3.4 G/DL (ref 3.5–5.2)
ALBUMIN/GLOB SERPL: 0.9 G/DL
ALP SERPL-CCNC: 90 U/L (ref 39–117)
ALT SERPL W P-5'-P-CCNC: 27 U/L (ref 1–33)
ANION GAP SERPL CALCULATED.3IONS-SCNC: 13 MMOL/L (ref 5–15)
AST SERPL-CCNC: 46 U/L (ref 1–32)
BASOPHILS # BLD AUTO: 0.07 10*3/MM3 (ref 0–0.2)
BASOPHILS NFR BLD AUTO: 0.9 % (ref 0–1.5)
BILIRUB SERPL-MCNC: 0.9 MG/DL (ref 0–1.2)
BUN SERPL-MCNC: 17 MG/DL (ref 6–20)
BUN/CREAT SERPL: 24.6 (ref 7–25)
CALCIUM SPEC-SCNC: 9.4 MG/DL (ref 8.6–10.5)
CHLORIDE SERPL-SCNC: 100 MMOL/L (ref 98–107)
CO2 SERPL-SCNC: 20 MMOL/L (ref 22–29)
CREAT SERPL-MCNC: 0.69 MG/DL (ref 0.57–1)
DEPRECATED RDW RBC AUTO: 53.5 FL (ref 37–54)
EGFRCR SERPLBLD CKD-EPI 2021: 120.7 ML/MIN/1.73
EOSINOPHIL # BLD AUTO: 0.22 10*3/MM3 (ref 0–0.4)
EOSINOPHIL NFR BLD AUTO: 2.8 % (ref 0.3–6.2)
ERYTHROCYTE [DISTWIDTH] IN BLOOD BY AUTOMATED COUNT: 14.5 % (ref 12.3–15.4)
GLOBULIN UR ELPH-MCNC: 3.7 GM/DL
GLUCOSE SERPL-MCNC: 72 MG/DL (ref 65–99)
HCT VFR BLD AUTO: 32.6 % (ref 34–46.6)
HGB BLD-MCNC: 10.8 G/DL (ref 12–15.9)
IMM GRANULOCYTES # BLD AUTO: 0.02 10*3/MM3 (ref 0–0.05)
IMM GRANULOCYTES NFR BLD AUTO: 0.3 % (ref 0–0.5)
LIPASE SERPL-CCNC: 777 U/L (ref 13–60)
LYMPHOCYTES # BLD AUTO: 1.57 10*3/MM3 (ref 0.7–3.1)
LYMPHOCYTES NFR BLD AUTO: 19.7 % (ref 19.6–45.3)
MAGNESIUM SERPL-MCNC: 2 MG/DL (ref 1.6–2.6)
MCH RBC QN AUTO: 32.8 PG (ref 26.6–33)
MCHC RBC AUTO-ENTMCNC: 33.1 G/DL (ref 31.5–35.7)
MCV RBC AUTO: 99.1 FL (ref 79–97)
MONOCYTES # BLD AUTO: 0.62 10*3/MM3 (ref 0.1–0.9)
MONOCYTES NFR BLD AUTO: 7.8 % (ref 5–12)
NEUTROPHILS NFR BLD AUTO: 5.45 10*3/MM3 (ref 1.7–7)
NEUTROPHILS NFR BLD AUTO: 68.5 % (ref 42.7–76)
NRBC BLD AUTO-RTO: 0 /100 WBC (ref 0–0.2)
PHOSPHATE SERPL-MCNC: 1.3 MG/DL (ref 2.5–4.5)
PHOSPHATE SERPL-MCNC: 3.9 MG/DL (ref 2.5–4.5)
PLATELET # BLD AUTO: 98 10*3/MM3 (ref 140–450)
PMV BLD AUTO: 10.5 FL (ref 6–12)
POTASSIUM SERPL-SCNC: 3.6 MMOL/L (ref 3.5–5.2)
PROT SERPL-MCNC: 7.1 G/DL (ref 6–8.5)
RBC # BLD AUTO: 3.29 10*6/MM3 (ref 3.77–5.28)
SODIUM SERPL-SCNC: 133 MMOL/L (ref 136–145)
WBC NRBC COR # BLD AUTO: 7.95 10*3/MM3 (ref 3.4–10.8)

## 2024-10-09 PROCEDURE — 83690 ASSAY OF LIPASE: CPT | Performed by: STUDENT IN AN ORGANIZED HEALTH CARE EDUCATION/TRAINING PROGRAM

## 2024-10-09 PROCEDURE — 83735 ASSAY OF MAGNESIUM: CPT | Performed by: STUDENT IN AN ORGANIZED HEALTH CARE EDUCATION/TRAINING PROGRAM

## 2024-10-09 PROCEDURE — 25810000003 SODIUM CHLORIDE 0.9 % SOLUTION: Performed by: STUDENT IN AN ORGANIZED HEALTH CARE EDUCATION/TRAINING PROGRAM

## 2024-10-09 PROCEDURE — 25010000002 HYDROMORPHONE PER 4 MG: Performed by: STUDENT IN AN ORGANIZED HEALTH CARE EDUCATION/TRAINING PROGRAM

## 2024-10-09 PROCEDURE — 80053 COMPREHEN METABOLIC PANEL: CPT | Performed by: STUDENT IN AN ORGANIZED HEALTH CARE EDUCATION/TRAINING PROGRAM

## 2024-10-09 PROCEDURE — 84100 ASSAY OF PHOSPHORUS: CPT | Performed by: STUDENT IN AN ORGANIZED HEALTH CARE EDUCATION/TRAINING PROGRAM

## 2024-10-09 PROCEDURE — 90791 PSYCH DIAGNOSTIC EVALUATION: CPT | Performed by: SOCIAL WORKER

## 2024-10-09 PROCEDURE — 36415 COLL VENOUS BLD VENIPUNCTURE: CPT | Performed by: STUDENT IN AN ORGANIZED HEALTH CARE EDUCATION/TRAINING PROGRAM

## 2024-10-09 PROCEDURE — G0378 HOSPITAL OBSERVATION PER HR: HCPCS

## 2024-10-09 PROCEDURE — 85025 COMPLETE CBC W/AUTO DIFF WBC: CPT | Performed by: STUDENT IN AN ORGANIZED HEALTH CARE EDUCATION/TRAINING PROGRAM

## 2024-10-09 RX ORDER — HYDRALAZINE HYDROCHLORIDE 50 MG/1
25 TABLET, FILM COATED ORAL EVERY 8 HOURS PRN
Status: DISCONTINUED | OUTPATIENT
Start: 2024-10-09 | End: 2024-10-13 | Stop reason: HOSPADM

## 2024-10-09 RX ORDER — LORAZEPAM 1 MG/1
1 TABLET ORAL
Status: DISCONTINUED | OUTPATIENT
Start: 2024-10-09 | End: 2024-10-13 | Stop reason: HOSPADM

## 2024-10-09 RX ORDER — LORAZEPAM 2 MG/ML
2 INJECTION INTRAMUSCULAR
Status: DISCONTINUED | OUTPATIENT
Start: 2024-10-09 | End: 2024-10-13 | Stop reason: HOSPADM

## 2024-10-09 RX ORDER — LORAZEPAM 1 MG/1
2 TABLET ORAL
Status: DISCONTINUED | OUTPATIENT
Start: 2024-10-09 | End: 2024-10-13 | Stop reason: HOSPADM

## 2024-10-09 RX ORDER — HYDRALAZINE HYDROCHLORIDE 50 MG/1
50 TABLET, FILM COATED ORAL ONCE
Status: COMPLETED | OUTPATIENT
Start: 2024-10-09 | End: 2024-10-09

## 2024-10-09 RX ORDER — SODIUM CHLORIDE 9 MG/ML
100 INJECTION, SOLUTION INTRAVENOUS CONTINUOUS
Status: DISCONTINUED | OUTPATIENT
Start: 2024-10-09 | End: 2024-10-10

## 2024-10-09 RX ORDER — LORAZEPAM 2 MG/ML
1 INJECTION INTRAMUSCULAR
Status: DISCONTINUED | OUTPATIENT
Start: 2024-10-09 | End: 2024-10-13 | Stop reason: HOSPADM

## 2024-10-09 RX ORDER — SODIUM CHLORIDE 9 MG/ML
100 INJECTION, SOLUTION INTRAVENOUS CONTINUOUS
Status: ACTIVE | OUTPATIENT
Start: 2024-10-09 | End: 2024-10-10

## 2024-10-09 RX ORDER — HYDRALAZINE HYDROCHLORIDE 20 MG/ML
10 INJECTION INTRAMUSCULAR; INTRAVENOUS EVERY 6 HOURS PRN
Status: DISCONTINUED | OUTPATIENT
Start: 2024-10-09 | End: 2024-10-09

## 2024-10-09 RX ADMIN — SODIUM CHLORIDE 100 ML/HR: 9 INJECTION, SOLUTION INTRAVENOUS at 20:34

## 2024-10-09 RX ADMIN — SODIUM CHLORIDE 100 ML/HR: 9 INJECTION, SOLUTION INTRAVENOUS at 04:37

## 2024-10-09 RX ADMIN — Medication 10 ML: at 22:22

## 2024-10-09 RX ADMIN — FOLIC ACID 1 MG: 1 TABLET ORAL at 08:50

## 2024-10-09 RX ADMIN — ACETAMINOPHEN 325MG 650 MG: 325 TABLET ORAL at 04:41

## 2024-10-09 RX ADMIN — Medication 1 TABLET: at 08:50

## 2024-10-09 RX ADMIN — Medication 2 PACKET: at 12:38

## 2024-10-09 RX ADMIN — HYDRALAZINE HYDROCHLORIDE 50 MG: 50 TABLET ORAL at 18:33

## 2024-10-09 RX ADMIN — Medication 100 MG: at 08:50

## 2024-10-09 RX ADMIN — HYDROMORPHONE HYDROCHLORIDE 0.5 MG: 1 INJECTION, SOLUTION INTRAMUSCULAR; INTRAVENOUS; SUBCUTANEOUS at 16:02

## 2024-10-09 RX ADMIN — ACETAMINOPHEN 325MG 650 MG: 325 TABLET ORAL at 08:50

## 2024-10-09 RX ADMIN — SODIUM PHOSPHATE, MONOBASIC, MONOHYDRATE AND SODIUM PHOSPHATE, DIBASIC, ANHYDROUS 15 MMOL: 276; 142 INJECTION, SOLUTION INTRAVENOUS at 12:38

## 2024-10-09 RX ADMIN — HYDRALAZINE HYDROCHLORIDE 25 MG: 50 TABLET ORAL at 14:56

## 2024-10-09 RX ADMIN — SODIUM PHOSPHATE, MONOBASIC, MONOHYDRATE AND SODIUM PHOSPHATE, DIBASIC, ANHYDROUS 15 MMOL: 276; 142 INJECTION, SOLUTION INTRAVENOUS at 16:05

## 2024-10-09 NOTE — PROGRESS NOTES
Name: Adriel Armenta ADMIT: 10/8/2024   : 1995  PCP: Provider, No Known    MRN: [id number] LOS: 0 days   AGE/SEX: 28 y.o. female  ROOM: [facility]     Subjective   Subjective   No acute events overnight.  Patient states she is feeling a bit better this morning.  Still having some abdominal discomfort but has overall improved.  No chest pain or shortness of breath.    Objective   Objective     Vital Signs  Temp:  [97.9 °F (36.6 °C)-98.5 °F (36.9 °C)] 97.9 °F (36.6 °C)  Heart Rate:  [] 90  Resp:  [16-18] 18  BP: (124-144)/() 144/102  SpO2:  [95 %-100 %] 99 %  on   ;   Device (Oxygen Therapy): room air  Body mass index is 22.64 kg/m².    Physical Exam  General: Alert, no acute distress.  Lying in bed.  Answers questions appropriately.  ENT: No conjunctival injection or scleral icterus. Moist mucous membranes.   Neuro: Eyes open and moving in all directions, strength normal in all extremities, no focal deficits.   Lungs: Clear to auscultation bilaterally. No wheeze or crackles. No distress.   Heart: RRR, no murmurs. No edema.  Abdomen: Soft, mildly distended.  Normal bowel sounds.  Generalized tenderness to palpation with no rebound or guarding.  Ext: Warm and well-perfused. No edema.   Skin: No rashes or lesions. IV site without swelling or erythema.     Results Review     I reviewed the patient's new clinical results:  Results from last 7 days   Lab Units 10/09/24  0536 10/08/24  1538   WBC 10*3/mm3 7.95 11.73*   HEMOGLOBIN g/dL 10.8* 15.1   PLATELETS 10*3/mm3 98* 162     Results from last 7 days   Lab Units 10/09/24  0536 10/08/24  1538   SODIUM mmol/L 133* 131*   POTASSIUM mmol/L 3.6 3.8   CHLORIDE mmol/L 100 87*   CO2 mmol/L 20.0* 22.0   BUN mg/dL 17 28*   CREATININE mg/dL 0.69 1.13*   GLUCOSE mg/dL 72 91   EGFR mL/min/1.73 120.7 67.7     Results from last 7 days   Lab Units 10/09/24  0536 10/08/24  1538   ALBUMIN g/dL 3.4* 5.4*   BILIRUBIN mg/dL 0.9 1.5*   ALK PHOS U/L 90 140*   AST (SGOT)  "U/L 46* 65*   ALT (SGPT) U/L 27 41*     Results from last 7 days   Lab Units 10/09/24  0536 10/08/24  1538   CALCIUM mg/dL 9.4 12.1*   ALBUMIN g/dL 3.4* 5.4*   MAGNESIUM mg/dL 2.0 2.3   PHOSPHORUS mg/dL 1.3*  --      Results from last 7 days   Lab Units 10/08/24  1538   LACTATE mmol/L 1.5     No results found for: \"HGBA1C\", \"POCGLU\"    CT Abdomen Pelvis Without Contrast    Result Date: 10/8/2024  1. There is some inflammatory stranding around the head of the pancreas suggesting acute pancreatitis. There is no fluid collection or abscess 2. There are bilateral nonobstructing kidney stones 3. There are bilateral renal cysts as described  Radiation dose reduction techniques were utilized, including automated exposure control and exposure modulation based on body size.   This report was finalized on 10/8/2024 4:54 PM by Dr. Jermaine France M.D on Workstation: [id number]       I have personally reviewed all medications:  Scheduled Medications  folic acid, 1 mg, Oral, Daily  multivitamin, 1 tablet, Oral, Daily  sodium chloride, 10 mL, Intravenous, Q12H  sodium phosphate, 15 mmol, Intravenous, Q3H  thiamine, 100 mg, Oral, Daily    Infusions  sodium chloride, 100 mL/hr, Last Rate: 100 mL/hr (10/09/24 1239)    Diet  Diet: Liquid; Clear Liquid; Fluid Consistency: Thin (IDDSI 0)      Intake/Output Summary (Last 24 hours) at 10/9/2024 1426  Last data filed at 10/8/2024 2100  Gross per 24 hour   Intake 1390 ml   Output --   Net 1390 ml       Assessment/Plan     Active Hospital Problems    Diagnosis  POA    **Acute alcoholic pancreatitis [K85.20]  Yes    Alcohol use disorder [F10.90]  Yes    Cocaine use disorder [F14.10]  Yes    Hepatic steatosis [K76.0]  Yes    Hyponatremia [E87.1]  Yes      Resolved Hospital Problems   No resolved problems to display.       28 y.o. female with Acute alcoholic pancreatitis.    Acute pancreatitis  - CT c/w pancreatitis, no abscess/fluid collection  - lipase improved to 777 today, repeat " tomorrow   - continue IVF  - pain meds as needed  - continue clear liquid diet today, may advance tomorrow      Alcohol use disorder  Severe heaptic steatosis  Mild elevation in LFTs  - Etoh neg in ED  - last drink 4 days ago-she reports some mild withdrawal symptoms which have subsided  - BP very elevated, going to start CIWA as precaution  - Add hydralazine for elevated BP (pain also likely contributing)  - continue MVI, thiamine, folate; repeat CMP  - access consult  - LFTs improved today, repeat CMP with AM labs      Asymptomatic bacteria  Hematuria  -UA collected in the ER did show nitrites leuk esterase and bacteria but also 7-12 squamous epithelial cells consistent with contamination, she denies any dysuria or frequency  -Urine cx growing E. Coli  -Given no symptoms, hold off on treatment for now  -Can reassess if she develops symptoms     Hyponatremia  - related to alcohol use likely, improved today   - daily bmp    Hypophosphatemia  - phosphate low at 1.3 this morning  - replace as needed, monitor with daily labs     Cocaine use disorder  - access consult    Anemia  Thrombocytopenia  -Fairly large drop in both platelets and hemoglobin today, suspect a dilutional component and patient was likely also hemoconcentrated on arrival  -No signs of active bleeding, monitor for now  -Repeat CBC with morning labs, transfuse for Hgb less than 7       SCDs for DVT prophylaxis.  Full code.  Discussed with patient and nursing staff.  Anticipate discharge home timing yet to be determined.      Brisa Galvez MD  [facility]  10/09/24  14:26 EDT

## 2024-10-09 NOTE — CONSULTS
"[facility] evaluated 28-year-old female for alcohol abuse.  Patient stopped drinking approximately 4 days ago.  Patient's current CIWA is a 3.  Patient states she was drinking 1/5 of bourbon daily for the past year.  Patient states the trigger to that was her mother dying of lung cancer and her boyfriend of 3-1/2 years dying of liver failure due to alcoholism.  Prior to this year patient has abused alcohol first in 2016.  Patient states her father was an active alcoholic when she was growing up but has been sober for many years and involved in [employer].  Patient has been fearful that he will be disappointed in her when he finds out she has been abusing alcohol.  Patient states she planned on talking to him today about it.  Patient denies SI and states she has a history of 1 attempt at age 15 from cutting herself and trying to OD.  Patient states [place] 15.  Patient states she never did any counseling outside of being in the hospital.  Patient is having medical issues now due to her drinking that include acute alcoholic pancreatitis and acute kidney injury.  Patient states it was extremely painful when she came into the hospital and she wants to stop drinking.  Patient states a few months ago she did a \"10-day alcohol cleanse\" and stated she struggled the first 2 days but then it was okay afterwards.  Patient has no prior treatment for substance abuse.  Patient states she is also been doing cocaine 1 or 2 times a week approximately a half a gram and has been doing that for the past year as well.  Patient rates her current cravings as a 0/10.  Patient states her sleep has been poor this past year and she has vivid dreams.  Patient states her appetite has also been poor due to the amount she is drinking.    Patient lives alone in an apartment and works as a  at the old [employer] for the past 3 to 4 months.  Patient states she is working on getting her bachelor's " degree in IT.  Patient states she already has an associates in [employer].  Patient states her support system is her best friend and sometimes her father.  Patient was given a list of substance abuse treatment programs and support groups.  Access talked with the patient about the different levels of care.  Patient states she will be working with her father to try to get some insurance through the state.  Access will continue to follow.

## 2024-10-09 NOTE — CASE MANAGEMENT/SOCIAL WORK
Discharge Planning Assessment  [facility]     Patient Name: Adriel Armenta  MRN: [id number]  Today's Date: 10/9/2024    Admit Date: 10/8/2024    Plan: Plan home.  MODESTO White RN   Discharge Needs Assessment       Row Name 10/09/24 0822       Living Environment    People in Home alone    Current Living Arrangements apartment    Potentially Unsafe Housing Conditions none    In the past 12 months has the electric, gas, oil, or water company threatened to shut off services in your home? No    Primary Care Provided by self    Provides Primary Care For no one    Family Caregiver if Needed parent(s)    Family Caregiver Names Father  ( Louie Armenta [phone])    Quality of Family Relationships helpful;involved;supportive    Able to Return to Prior Arrangements yes    Living Arrangement Comments Pt lives alone in a second floor apartment.       Resource/Environmental Concerns    Resource/Environmental Concerns none    Transportation Concerns none       Transportation Needs    In the past 12 months, has lack of transportation kept you from medical appointments or from getting medications? no    In the past 12 months, has lack of transportation kept you from meetings, work, or from getting things needed for daily living? No       Food Insecurity    Within the past 12 months, you worried that your food would run out before you got the money to buy more. Never true    Within the past 12 months, the food you bought just didn't last and you didn't have money to get more. Never true       Transition Planning    Patient/Family Anticipates Transition to home    Patient/Family Anticipated Services at Transition none    Transportation Anticipated family or friend will provide       Discharge Needs Assessment    Readmission Within the Last 30 Days no previous admission in last 30 days    Equipment Currently Used at Home none    Concerns to be Addressed no discharge needs identified;denies needs/concerns at this time     Anticipated Changes Related to Illness none    Equipment Needed After Discharge none                   Discharge Plan       Row Name 10/09/24 0823       Plan    Plan Plan home.  MODESTO White RN    Patient/Family in Agreement with Plan yes    Plan Comments FACE SHEET VERIFIED.  Spoke with pt at bedside. Pt lives alone in a second floor apartment. Pt is independent with ADLs.  Pt denies using any DMEs.  Pt gets her prescriptions at [facility] in [place]. Pt denies any issues affording medications. Pt is not current with .  Pt has not been in SNF.  Pt denies any discharge needs.  Pt's father or a friend will assist pt at home if needed and will transport her home. Plan home.  MODESTO White RN                  Continued Care and Services - Admitted Since 10/8/2024    No active coordination exists for this encounter.       Expected Discharge Date and Time       Expected Discharge Date Expected Discharge Time    Oct 14, 2024            Demographic Summary       Row Name 10/09/24 0821       General Information    Admission Type observation    Arrived From emergency department    Referral Source admission list    Reason for Consult discharge planning    Preferred Language English                   Functional Status       Row Name 10/09/24 0821       Functional Status    Usual Activity Tolerance good    Current Activity Tolerance good       Functional Status, IADL    Medications independent    Meal Preparation independent    Housekeeping independent    Laundry independent    Shopping independent       Mental Status    General Appearance WDL WDL                   Psychosocial    No documentation.                  Abuse/Neglect    No documentation.                  Legal    No documentation.                  Substance Abuse    No documentation.                  Patient Forms    No documentation.                     Catrina White, RN

## 2024-10-09 NOTE — PLAN OF CARE
Goal Outcome Evaluation:      Pt admitted this shift, Database and assessment completed. Aox4 , room air , SR on tele. High Blood pressure noted, LHA aware.PRN  Hydralazine ordered. C/O pain ,medicated [er order. IVF continues to infuse . Plan of care is ongoing.

## 2024-10-09 NOTE — CASE MANAGEMENT/SOCIAL WORK
Continued Stay Note  [facility]     Patient Name: Adriel Armenta  MRN: [id number]  Today's Date: 10/9/2024    Admit Date: 10/8/2024    Plan: Plan home.  MODESTO White RN   Discharge Plan       Row Name 10/09/24 0823       Plan    Plan Plan home.  MODESTO White RN    Patient/Family in Agreement with Plan yes    Plan Comments FACE SHEET VERIFIED.  Spoke with pt at bedside. Pt lives alone in a second floor apartment. Pt is independent with ADLs.  Pt denies using any DMEs.  Pt gets her prescriptions at [facility] in [place]. Pt denies any issues affording medications. Pt is not current with HH.  Pt has not been in SNF.  Pt denies any discharge needs.  Pt's father or a friend will assist pt at home if needed and will transport her home. Plan home.  MODESTO White RN                   Discharge Codes    No documentation.                 Expected Discharge Date and Time       Expected Discharge Date Expected Discharge Time    Oct 14, 2024               Catrina White RN

## 2024-10-09 NOTE — PROGRESS NOTES
"Nutrition Services    Patient Name:  Adriel Armenta  YOB: 1995  MRN: [id number]  Admit Date:  10/8/2024Assessment Date:  10/09/24    Summary: GABRIELLA. 28 yoF admitted for alcohol abuse with N/V. PMH: alcoholic pancreatitis, substance abuse, and BAILEY. Pt states she has lost about 75# in the last year and a half, with initial weight of 225#. Pt states she is now about 150#. She has had decreased appetite lately and eats 1 meal/day. She is agreeable to boost breeze supplements. NFPE performed and no significant signs of muscle wasting of fat loss seen.   Meds and labs reviewed    PLAN  - boost breeze TID   - encourage oral intakes   - may change to boost original as diet advances     RD to follow     CLINICAL NUTRITION ASSESSMENT      Reason for Assessment Nurse Admission Screen     Diagnosis/Problem   Acute alcoholic pancreatitis    Medical/Surgical History Past Medical History:   Diagnosis Date    ASCUS of cervix with negative high risk HPV 09/05/2014    Hydrocalycosis     LGSIL on Pap smear of cervix 03/12/2014    HPV POSITIVE       Past Surgical History:   Procedure Laterality Date    APPENDECTOMY          Anthropometrics        Current Height  Current Weight  BMI kg/m2 Height: 175.3 cm (69\")  Weight: 69.5 kg (153 lb 4.8 oz) (10/08/24 2046)  Body mass index is 22.64 kg/m².   Adjusted BMI (if applicable)    BMI Category Normal/Healthy (18.4 - 24.9)   Ideal Body Weight (IBW) 145#   Usual Body Weight (UBW) 225#   Weight Trend Loss   Weight History Wt Readings from Last 30 Encounters:   10/08/24 2046 69.5 kg (153 lb 4.8 oz)   04/26/24 1201 71.7 kg (158 lb)   04/01/24 1932 73.9 kg (163 lb)   04/01/24 1927 73.9 kg (163 lb)   03/19/24 0103 73.9 kg (163 lb)   11/20/23 1629 74.8 kg (165 lb)   01/01/21 1502 97.5 kg (215 lb)   05/28/20 1151 92.7 kg (204 lb 6.4 oz)   10/22/19 1018 83.8 kg (184 lb 12.8 oz)   08/15/19 1008 84.5 kg (186 lb 3.2 oz)   07/03/19 1340 83.1 kg (183 lb 1.6 oz)   06/18/19 1253 77.1 kg (170 " "lb)   05/03/19 1431 79.8 kg (175 lb 14.4 oz)   03/28/19 1520 78.9 kg (174 lb)   08/24/15 1532 69.4 kg (153 lb 1.8 oz)   04/20/15 1354 66.5 kg (146 lb 11.1 oz)   03/12/14 1303 66.9 kg (147 lb 8.2 oz) (79%, Z= 0.80)*   02/24/14 1406 68.2 kg (150 lb 6 oz) (81%, Z= 0.89)*   04/08/13 1440 73.9 kg (163 lb 0.1 oz) (91%, Z= 1.31)*     * Growth percentiles are based on CDC (Girls, 2-20 Years) data.        Estimated/Assessed Needs       Energy Requirements    Weight for Calculation 69.5kg   Method for Estimation  25-30 kcal/kg   EST Needs (kcal/day) 8818-0317       Protein Requirements    Weight for Calculation 69.5kg   EST Protein Needs (g/kg) 1.0 - 1.2 gm/kg   EST Daily Needs (g/day) 70-83       Fluid Requirements     Method for Estimation 1 mL/kcal    Estimated Needs (mL/day)      Labs       Pertinent Labs    Results from last 7 days   Lab Units 10/09/24  0536 10/08/24  1538   SODIUM mmol/L 133* 131*   POTASSIUM mmol/L 3.6 3.8   CHLORIDE mmol/L 100 87*   CO2 mmol/L 20.0* 22.0   BUN mg/dL 17 28*   CREATININE mg/dL 0.69 1.13*   CALCIUM mg/dL 9.4 12.1*   BILIRUBIN mg/dL 0.9 1.5*   ALK PHOS U/L 90 140*   ALT (SGPT) U/L 27 41*   AST (SGOT) U/L 46* 65*   GLUCOSE mg/dL 72 91     Results from last 7 days   Lab Units 10/09/24  0536 10/08/24  1538   MAGNESIUM mg/dL 2.0 2.3   PHOSPHORUS mg/dL 1.3*  --    HEMOGLOBIN g/dL 10.8* 15.1   HEMATOCRIT % 32.6* 43.7   WBC 10*3/mm3 7.95 11.73*   ALBUMIN g/dL 3.4* 5.4*     Results from last 7 days   Lab Units 10/09/24  0536 10/08/24  1538   PLATELETS 10*3/mm3 98* 162     No results found for: \"COVID19\"  No results found for: \"HGBA1C\"       Medications           Scheduled Medications folic acid, 1 mg, Oral, Daily  multivitamin, 1 tablet, Oral, Daily  sodium chloride, 10 mL, Intravenous, Q12H  sodium phosphate, 15 mmol, Intravenous, Q3H  thiamine, 100 mg, Oral, Daily       Infusions sodium chloride, 100 mL/hr, Last Rate: 100 mL/hr (10/09/24 1239)       PRN Medications   acetaminophen **OR** " acetaminophen **OR** acetaminophen    senna-docusate sodium **AND** polyethylene glycol **AND** bisacodyl **AND** bisacodyl    hydrALAZINE    HYDROmorphone    influenza vaccine    melatonin    nitroglycerin    ondansetron ODT **OR** ondansetron    [COMPLETED] Insert Peripheral IV **AND** sodium chloride    sodium chloride    sodium chloride     Physical Findings          General Findings alert, oriented, room air   Oral/Mouth Cavity WDL   Edema  no edema   Gastrointestinal WDL   Skin  skin intact   Tubes/Drains/Lines none   NFPE Date Completed: 10/9 HT   --  Current Nutrition Orders & Evaluation of Intake       Oral Nutrition     Food Allergies NKFA   Current PO Diet Diet: Liquid; Clear Liquid; Fluid Consistency: Thin (IDDSI 0)   Supplement n/a   PO Evaluation     % PO Intake clears    Factors Affecting Intake: abdominal pain, nausea, vomiting   --  PES STATEMENT / NUTRITION DIAGNOSIS      Nutrition Dx Problem  Problem: Predicted Suboptimal Intake  Etiology: Medical Diagnosis - alcoholic pancreatitis     Signs/Symptoms: Clear Liquid Diet, Report of Minimal PO Intake, and Report/Observation     NUTRITION INTERVENTION / PLAN OF CARE      Intervention Goal(s) Establish PO intake, Tolerate PO , Increase intake, Accepts oral nutrition supplement, Advance diet, Maintain weight, and PO intake goal %: 75         RD Intervention/Action Supplement provided, Encourage intake, and Continue to monitor   --      Prescription/Orders:       PO Diet       Supplements Boost breeze TID       Enteral Nutrition       Parenteral Nutrition    New Prescription Ordered? Yes   --      Monitor/Evaluation Per protocol, I&O, PO intake, Supplement intake, Pertinent labs, Weight, GI status   Discharge Plan/Needs Pending clinical course   --    RD to follow per protocol.      Electronically signed by:  Leia Moreno RD  10/09/24 12:58 EDT

## 2024-10-10 LAB
ALBUMIN SERPL-MCNC: 3.3 G/DL (ref 3.5–5.2)
ALBUMIN/GLOB SERPL: 0.9 G/DL
ALP SERPL-CCNC: 80 U/L (ref 39–117)
ALT SERPL W P-5'-P-CCNC: 27 U/L (ref 1–33)
ANION GAP SERPL CALCULATED.3IONS-SCNC: 10.8 MMOL/L (ref 5–15)
AST SERPL-CCNC: 50 U/L (ref 1–32)
BACTERIA SPEC AEROBE CULT: ABNORMAL
BILIRUB SERPL-MCNC: 1 MG/DL (ref 0–1.2)
BUN SERPL-MCNC: 6 MG/DL (ref 6–20)
BUN/CREAT SERPL: 11.1 (ref 7–25)
CALCIUM SPEC-SCNC: 9.1 MG/DL (ref 8.6–10.5)
CHLORIDE SERPL-SCNC: 100 MMOL/L (ref 98–107)
CO2 SERPL-SCNC: 24.2 MMOL/L (ref 22–29)
CREAT SERPL-MCNC: 0.54 MG/DL (ref 0.57–1)
DEPRECATED RDW RBC AUTO: 50 FL (ref 37–54)
EGFRCR SERPLBLD CKD-EPI 2021: 128 ML/MIN/1.73
ERYTHROCYTE [DISTWIDTH] IN BLOOD BY AUTOMATED COUNT: 14.4 % (ref 12.3–15.4)
GLOBULIN UR ELPH-MCNC: 3.6 GM/DL
GLUCOSE SERPL-MCNC: 87 MG/DL (ref 65–99)
HCT VFR BLD AUTO: 33.1 % (ref 34–46.6)
HGB BLD-MCNC: 11.5 G/DL (ref 12–15.9)
LIPASE SERPL-CCNC: 1019 U/L (ref 13–60)
MAGNESIUM SERPL-MCNC: 1.5 MG/DL (ref 1.6–2.6)
MCH RBC QN AUTO: 33.4 PG (ref 26.6–33)
MCHC RBC AUTO-ENTMCNC: 34.7 G/DL (ref 31.5–35.7)
MCV RBC AUTO: 96.2 FL (ref 79–97)
PHOSPHATE SERPL-MCNC: 2.6 MG/DL (ref 2.5–4.5)
PLATELET # BLD AUTO: 82 10*3/MM3 (ref 140–450)
PMV BLD AUTO: 10.9 FL (ref 6–12)
POTASSIUM SERPL-SCNC: 3.3 MMOL/L (ref 3.5–5.2)
PROT SERPL-MCNC: 6.9 G/DL (ref 6–8.5)
RBC # BLD AUTO: 3.44 10*6/MM3 (ref 3.77–5.28)
SODIUM SERPL-SCNC: 135 MMOL/L (ref 136–145)
WBC NRBC COR # BLD AUTO: 5.64 10*3/MM3 (ref 3.4–10.8)

## 2024-10-10 PROCEDURE — 83735 ASSAY OF MAGNESIUM: CPT | Performed by: STUDENT IN AN ORGANIZED HEALTH CARE EDUCATION/TRAINING PROGRAM

## 2024-10-10 PROCEDURE — 84100 ASSAY OF PHOSPHORUS: CPT | Performed by: STUDENT IN AN ORGANIZED HEALTH CARE EDUCATION/TRAINING PROGRAM

## 2024-10-10 PROCEDURE — 80053 COMPREHEN METABOLIC PANEL: CPT | Performed by: STUDENT IN AN ORGANIZED HEALTH CARE EDUCATION/TRAINING PROGRAM

## 2024-10-10 PROCEDURE — 25810000003 SODIUM CHLORIDE 0.9 % SOLUTION: Performed by: STUDENT IN AN ORGANIZED HEALTH CARE EDUCATION/TRAINING PROGRAM

## 2024-10-10 PROCEDURE — 83690 ASSAY OF LIPASE: CPT | Performed by: STUDENT IN AN ORGANIZED HEALTH CARE EDUCATION/TRAINING PROGRAM

## 2024-10-10 PROCEDURE — 85027 COMPLETE CBC AUTOMATED: CPT | Performed by: STUDENT IN AN ORGANIZED HEALTH CARE EDUCATION/TRAINING PROGRAM

## 2024-10-10 RX ORDER — POTASSIUM CHLORIDE 750 MG/1
40 TABLET, FILM COATED, EXTENDED RELEASE ORAL EVERY 4 HOURS
Status: COMPLETED | OUTPATIENT
Start: 2024-10-10 | End: 2024-10-10

## 2024-10-10 RX ADMIN — FOLIC ACID 1 MG: 1 TABLET ORAL at 08:22

## 2024-10-10 RX ADMIN — MAGNESIUM OXIDE 400 MG (241.3 MG MAGNESIUM) TABLET 400 MG: TABLET at 19:59

## 2024-10-10 RX ADMIN — POTASSIUM CHLORIDE 40 MEQ: 750 TABLET, EXTENDED RELEASE ORAL at 11:52

## 2024-10-10 RX ADMIN — DIBASIC SODIUM PHOSPHATE, MONOBASIC POTASSIUM PHOSPHATE AND MONOBASIC SODIUM PHOSPHATE 2 TABLET: 852; 155; 130 TABLET ORAL at 19:59

## 2024-10-10 RX ADMIN — SODIUM CHLORIDE 100 ML/HR: 9 INJECTION, SOLUTION INTRAVENOUS at 07:20

## 2024-10-10 RX ADMIN — Medication 10 ML: at 20:00

## 2024-10-10 RX ADMIN — POTASSIUM CHLORIDE 40 MEQ: 750 TABLET, EXTENDED RELEASE ORAL at 08:26

## 2024-10-10 RX ADMIN — Medication 1 TABLET: at 08:22

## 2024-10-10 RX ADMIN — Medication 100 MG: at 08:22

## 2024-10-10 RX ADMIN — DIBASIC SODIUM PHOSPHATE, MONOBASIC POTASSIUM PHOSPHATE AND MONOBASIC SODIUM PHOSPHATE 2 TABLET: 852; 155; 130 TABLET ORAL at 09:54

## 2024-10-10 RX ADMIN — Medication 10 ML: at 08:24

## 2024-10-10 RX ADMIN — MAGNESIUM OXIDE 400 MG (241.3 MG MAGNESIUM) TABLET 400 MG: TABLET at 09:54

## 2024-10-10 RX ADMIN — SODIUM CHLORIDE 100 ML/HR: 9 INJECTION, SOLUTION INTRAVENOUS at 19:57

## 2024-10-10 NOTE — PLAN OF CARE
Goal Outcome Evaluation:   Pt resting in bed, Aox4 , Room air, VSS, No sign of acute distress noted. Denies pain , N/V.  No sign of detox noted this shift  . Plan of care is ongoing.

## 2024-10-10 NOTE — NURSING NOTE
"[facility] follow-up d/t ETOH.    Patient sitting up in chair. The patient reported \"it's going pretty good.\" The patient reported good sleep. The patient denied any issues with ETOH withdrawal. Last CIWA 0. The patient denied any ETOH cravings. The patient acknowledged receiving resources from [facility] and denied any questions. The patient stated she plans on discussing SONIA treatment plans with her father as he is familiar with treatment options/AA. The patient denied any other needs from [facility] or need for any further [facility] follow-up visits. [facility] will sign off. Patient encouraged to reach out to staff if she would like to see the [facility] again.   "

## 2024-10-10 NOTE — PROGRESS NOTES
Name: Adriel Armenta ADMIT: 10/8/2024   : 1995  PCP: Provider, No Known    MRN: [id number] LOS: 0 days   AGE/SEX: 28 y.o. female  ROOM: [facility]     Subjective   Subjective   Sitting up in the bed.  No acute events overnight.  Reports she is feeling better, abdominal pain mild, improved.  Tolerating clear liquid diet.  Denies nausea vomiting.  Continues to complain of hematuria.  Denies urinary symptoms.    Review of Systems   As above  Objective   Objective   Vital Signs  Temp:  [97.9 °F (36.6 °C)-98.4 °F (36.9 °C)] 98.1 °F (36.7 °C)  Heart Rate:  [] 102  Resp:  [16-18] 16  BP: (117-147)/() 147/108  SpO2:  [99 %-100 %] 100 %  on   ;   Device (Oxygen Therapy): room air  Body mass index is 22.64 kg/m².  Physical Exam    General: Alert and oriented x3, no acute distress  HEENT: Normocephalic, atraumatic  CV: Regular rate and rhythm, no murmurs rubs or gallops  Lungs: Clear to auscultation bilaterally, no crackles or wheezes  Abdomen: Soft, mild diffuse palpation epigastric region  Extremities: No significant peripheral edema , no cyanosis     Results Review     I reviewed the patient's new clinical results.  Results from last 7 days   Lab Units 10/10/24  0444 10/09/24  0536 10/08/24  1538   WBC 10*3/mm3 5.64 7.95 11.73*   HEMOGLOBIN g/dL 11.5* 10.8* 15.1   PLATELETS 10*3/mm3 82* 98* 162     Results from last 7 days   Lab Units 10/10/24  0444 10/09/24  0536 10/08/24  1538   SODIUM mmol/L 135* 133* 131*   POTASSIUM mmol/L 3.3* 3.6 3.8   CHLORIDE mmol/L 100 100 87*   CO2 mmol/L 24.2 20.0* 22.0   BUN mg/dL 6 17 28*   CREATININE mg/dL 0.54* 0.69 1.13*   GLUCOSE mg/dL 87 72 91   Estimated Creatinine Clearance: 168.7 mL/min (A) (by C-G formula based on SCr of 0.54 mg/dL (L)).  Results from last 7 days   Lab Units 10/10/24  0444 10/09/24  0536 10/08/24  1538   ALBUMIN g/dL 3.3* 3.4* 5.4*   BILIRUBIN mg/dL 1.0 0.9 1.5*   ALK PHOS U/L 80 90 140*   AST (SGOT) U/L 50* 46* 65*   ALT (SGPT) U/L 27 27 41*  "    Results from last 7 days   Lab Units 10/10/24  0444 10/09/24  1756 10/09/24  0536 10/08/24  1538   CALCIUM mg/dL 9.1  --  9.4 12.1*   ALBUMIN g/dL 3.3*  --  3.4* 5.4*   MAGNESIUM mg/dL 1.5*  --  2.0 2.3   PHOSPHORUS mg/dL 2.6 3.9 1.3*  --      Results from last 7 days   Lab Units 10/08/24  1538   LACTATE mmol/L 1.5   No results found for: \"COVID19\"  No results found for: \"HGBA1C\", \"POCGLU\"        CT Abdomen Pelvis Without Contrast  Narrative: CT ABDOMEN AND PELVIS WITHOUT IV CONTRAST     HISTORY: Abdominal pain, nausea and vomiting     TECHNIQUE: Radiation dose reduction techniques were utilized, including  automated exposure control and exposure modulation based on body size.  Axial images were obtained through the abdomen and pelvis without the  administration of IV contrast. Coronal and sagittal reformatted images  were obtained.     COMPARISON: 4/26/2024     FINDINGS:      ABDOMEN:  There is some mild atelectasis in the right lung base. There is diffuse  fatty infiltration of the liver. There is some layering high attenuation  material in the gallbladder which may reflect sludge and/or stones. The  spleen is normal in size. Bilateral renal cysts are identified. Some of  these are smaller. One of them is larger and increased in density  located in the right midpole region likely reflecting a hemorrhagic  cyst. It now measures about 3.7 cm, previously about 2.3 cm. There are  multiple nonobstructing bilateral kidney stones. The adrenal glands are  unremarkable. There is some inflammatory stranding around the head of  the pancreas suggesting pancreatitis. Suggest correlation with lipase  levels. There is no fluid collection or abscess.     PELVIS:  The bladder is unremarkable. There is a 3.3 cm right ovarian cyst. No  free fluid is seen in the pelvis. Appendix not visualized. Colon  unremarkable. No acute bony abnormality     Impression: 1. There is some inflammatory stranding around the head of the " pancreas  suggesting acute pancreatitis. There is no fluid collection or abscess  2. There are bilateral nonobstructing kidney stones  3. There are bilateral renal cysts as described     Radiation dose reduction techniques were utilized, including automated  exposure control and exposure modulation based on body size.        This report was finalized on 10/8/2024 4:54 PM by Dr. Jermaine France M.D on Workstation: [id number]       Scheduled Medications  folic acid, 1 mg, Oral, Daily  magnesium oxide, 400 mg, Oral, BID  multivitamin, 1 tablet, Oral, Daily  phosphorus, 500 mg, Oral, BID  potassium chloride, 40 mEq, Oral, Q4H  sodium chloride, 10 mL, Intravenous, Q12H  thiamine, 100 mg, Oral, Daily    Infusions  sodium chloride, 100 mL/hr, Last Rate: 100 mL/hr (10/10/24 0950)    Diet  Diet: Liquid; Clear Liquid; Fluid Consistency: Thin (IDDSI 0)    I have personally reviewed     [x]  Laboratory   []  Microbiology   []  Radiology   []  EKG/Telemetry  []  Cardiology/Vascular   []  Pathology    []  Records       Assessment/Plan     Active Hospital Problems    Diagnosis  POA    **Acute alcoholic pancreatitis [K85.20]  Yes    Alcohol use disorder [F10.90]  Yes    Cocaine use disorder [F14.10]  Yes    Hepatic steatosis [K76.0]  Yes    Hyponatremia [E87.1]  Yes      Resolved Hospital Problems   No resolved problems to display.       28 y.o. female with Acute alcoholic pancreatitis.     Acute pancreatitis  - CT c/w pancreatitis, no abscess/fluid collection  -Lipase on admission was 917, initially improved to 777 but now elevated back up to 1019, follow-up patient actually notes improvement in her symptoms and denies worsened abdominal pain  - continue IVF  - pain meds as needed  - continue clear liquid diet today as lipase trending back up,  -Monitor labs, as well as.      Alcohol use disorder  Severe heaptic steatosis  Mild elevation in LFTs  - Etoh neg in ED  - last drink 4 days ago-she reports some mild withdrawal  symptoms which have subsided  - BP very elevated, going to start CIWA as precaution  - Add hydralazine for elevated BP (pain also likely contributing)  - continue MVI, thiamine, folate; repeat CMP  - access consult  - LFTs improved today, repeat CMP with AM labs      Asymptomatic bacteria  Gross hematuria  -UA collected in the ER did show nitrites leuk esterase and bacteria but also 7-12 squamous epithelial cells consistent with contamination, she denies any dysuria or frequency  -Urine cx growing E. Coli  -Given no symptoms, antibiotics on hold  Due to recurrent gross hematuria will consult urology     Hyponatremia  - related to alcohol use likely, improved today   -Monitor, stable/improved       Hypophosphatemia/hypokalemia/hypomagnesemia  -Phosphate improved, magnesium and potassium low, replacement ordered    Cocaine use disorder  - access consult         SCDs for DVT prophylaxis.  Full code.  Discussed with patient.  Expected Discharge Date: 10/14/2024; Expected Discharge Time:        Copied text in this note has been reviewed and is accurate as of 10/10/24.         Dictated utilizing Dragon dictation        Alicia Clark MD  [facility]  10/10/24  11:24 EDT

## 2024-10-10 NOTE — CONSULTS
"[facility]    Patient Identification:  NAME:  Adriel Armenta  Age:  28 y.o.   Sex:  female   :  1995   MRN:  [id number]     Chief complaint: gross hematuria    History of present illness:  Adriel Armenta is a 28 y.o. female  admitted for alcoholic pancreatitis. She has had intermittent gross hematuria. It has nearly resolved since admission. She previously saw Dr. Mcfarlane at [facility] (last seen 24). She has a known right complex renal cyst and hx of hydrocalycosis dx age 9. She was supposed to have cystoscopy and MRI but was not able to follow up.    She currently reports some mild \"kidney pain\" but is AF, HDS. WBC and Cr reassuring. Ucx growing e coli.    Lab Results   Component Value Date    CREATININE 0.54 (L) 10/10/2024    WBC 5.64 10/10/2024    HGB 11.5 (L) 10/10/2024     Urine Culture          2023    17:25 3/19/2024    01:27 10/8/2024    15:38   Urine Culture   Urine Culture Final report  No growth  >100,000 CFU/mL Escherichia coli     No results found for this or any previous visit.  Results from last 7 days   Lab Units 10/08/24  1538   COLOR UA  Yellow   CLARITY UA  Turbid*   BILIRUBIN UA  Negative   KETONES UA  80 mg/dL (3+)*   PH, URINE  6.0   UROBILINOGEN UA  1.0 E.U./dL   LEUKOCYTES UA  Large (3+)*   NITRITE UA  Positive*   BACTERIA UA /HPF 4+*       10/8/24 CT AP viewed/interpreted by me - no hydro nephrosis; bl nonobstructing stones; bilateral renal cysts.    Past medical history:  Past Medical History:   Diagnosis Date    ASCUS of cervix with negative high risk HPV 2014    Hydrocalycosis     LGSIL on Pap smear of cervix 2014    HPV POSITIVE       Past surgical history:  Past Surgical History:   Procedure Laterality Date    APPENDECTOMY         Allergies:  Azithromycin, Iodinated contrast media, and Penicillins    Home medications:  No medications prior to admission.        Hospital medications:  folic acid, 1 mg, Oral, Daily  magnesium oxide, 400 mg, " Oral, BID  multivitamin, 1 tablet, Oral, Daily  phosphorus, 500 mg, Oral, BID  sodium chloride, 10 mL, Intravenous, Q12H  thiamine, 100 mg, Oral, Daily      sodium chloride, 100 mL/hr, Last Rate: 100 mL/hr (10/10/24 1153)        acetaminophen **OR** acetaminophen **OR** acetaminophen    senna-docusate sodium **AND** polyethylene glycol **AND** bisacodyl **AND** bisacodyl    hydrALAZINE    HYDROmorphone    influenza vaccine    LORazepam **OR** LORazepam **OR** LORazepam **OR** LORazepam **OR** LORazepam **OR** LORazepam    melatonin    nitroglycerin    ondansetron ODT **OR** ondansetron    [COMPLETED] Insert Peripheral IV **AND** sodium chloride    sodium chloride    sodium chloride    Family history:  Family History   Problem Relation Age of Onset    No Known Problems Mother     Diabetes Father     Hyperlipidemia Father     No Known Problems Brother        Social history:  Social History     Tobacco Use    Smoking status: Former     Current packs/day: 5.00     Average packs/day: 5.0 packs/day for 3.0 years (15.0 ttl pk-yrs)     Types: Cigarettes    Smokeless tobacco: Never   Vaping Use    Vaping status: Every Day    Substances: Nicotine, Flavoring    Devices: Disposable   Substance Use Topics    Alcohol use: Yes     Comment: EVERY WEEKEND     Drug use: Yes     Types: Cocaine(coke)       REVIEW OF SYSTEMS:  14 pt ROS performed and negative except per HPI    Objective:  TMax 24 hours:   Temp (24hrs), Av.2 °F (36.8 °C), Min:97.9 °F (36.6 °C), Max:98.4 °F (36.9 °C)      Vitals Ranges:   Temp:  [97.9 °F (36.6 °C)-98.4 °F (36.9 °C)] 98.3 °F (36.8 °C)  Heart Rate:  [] 91  Resp:  [16-18] 16  BP: (117-147)/() 137/96    Intake/Output Last 3 shifts:  I/O last 3 completed shifts:  In: 530 [P.O.:480; IV Piggyback:50]  Out: -      Physical Exam:    Gen: Well developed, well nourished  HEENT: Normocephalic, atraumatic  CV: Appears well perfused  Resp: No increased work of breathing  Abd: nondistended  MSK:  Bilateral upper extremities with full ROM  Neuro: No focal deficits appreciated  Psych: Normal mood and affect    Results review:   I reviewed the patient's new clinical results.    Data review:  Lab Results (last 24 hours)       Procedure Component Value Units Date/Time    Urine Culture - Urine, Urine, Clean Catch [[id number]]  (Abnormal)  (Susceptibility) Collected: 10/08/24 1538    Specimen: Urine, Clean Catch Updated: 10/10/24 0910     Urine Culture >100,000 CFU/mL Escherichia coli    Narrative:      Colonization of the urinary tract without infection is common. Treatment is discouraged unless the patient is symptomatic, pregnant, or undergoing an invasive urologic procedure.    Susceptibility        Escherichia coli      RYANN      Amoxicillin + Clavulanate Susceptible      Ampicillin Susceptible      Ampicillin + Sulbactam Susceptible      Cefazolin Susceptible      Cefepime Susceptible      Ceftazidime Susceptible      Ceftriaxone Susceptible      Gentamicin Susceptible      Levofloxacin Susceptible      Nitrofurantoin Susceptible      Piperacillin + Tazobactam Susceptible      Trimethoprim + Sulfamethoxazole Resistant                           Lipase [[id number]]  (Abnormal) Collected: 10/10/24 0444    Specimen: Blood Updated: 10/10/24 0624     Lipase 1,019 U/L     Magnesium [[id number]]  (Abnormal) Collected: 10/10/24 0444    Specimen: Blood Updated: 10/10/24 0617     Magnesium 1.5 mg/dL     Comprehensive Metabolic Panel [[id number]]  (Abnormal) Collected: 10/10/24 0444    Specimen: Blood Updated: 10/10/24 0617     Glucose 87 mg/dL      BUN 6 mg/dL      Creatinine 0.54 mg/dL      Sodium 135 mmol/L      Potassium 3.3 mmol/L      Comment: Slight hemolysis detected by analyzer. Result may be falsely elevated.        Chloride 100 mmol/L      CO2 24.2 mmol/L      Calcium 9.1 mg/dL      Total Protein 6.9 g/dL      Albumin 3.3 g/dL      ALT (SGPT) 27 U/L      AST (SGOT) 50 U/L      Alkaline Phosphatase 80 U/L      Total  Bilirubin 1.0 mg/dL      Globulin 3.6 gm/dL      A/G Ratio 0.9 g/dL      BUN/Creatinine Ratio 11.1     Anion Gap 10.8 mmol/L      eGFR 128.0 mL/min/1.73     Narrative:      GFR Normal >60  Chronic Kidney Disease <60  Kidney Failure <15      Phosphorus [[id number]]  (Normal) Collected: 10/10/24 0444    Specimen: Blood Updated: 10/10/24 0617     Phosphorus 2.6 mg/dL     CBC (No Diff) [[id number]]  (Abnormal) Collected: 10/10/24 0444    Specimen: Blood Updated: 10/10/24 0606     WBC 5.64 10*3/mm3      RBC 3.44 10*6/mm3      Hemoglobin 11.5 g/dL      Hematocrit 33.1 %      MCV 96.2 fL      MCH 33.4 pg      MCHC 34.7 g/dL      RDW 14.4 %      RDW-SD 50.0 fl      MPV 10.9 fL      Platelets 82 10*3/mm3     Phosphorus [[id number]]  (Normal) Collected: 10/09/24 1756    Specimen: Blood Updated: 10/09/24 1907     Phosphorus 3.9 mg/dL              Imaging:  Imaging Results (Last 24 Hours)       ** No results found for the last 24 hours. **               Assessment:       Acute alcoholic pancreatitis    Alcohol use disorder    Cocaine use disorder    Hepatic steatosis    Hyponatremia      Gross hematuria  Complex renal cyst  History of hydrocalycosis    Plan:     -No acute urologic intervention  -Will schedule patient for MRI and cystoscopy as previously discussed at last urology appointment  -recommend culture specific abx  -Please call with questions     Elizabeth Clements MD  10/10/24  13:54 EDT

## 2024-10-10 NOTE — PLAN OF CARE
Problem: Adult Inpatient Plan of Care  Goal: Plan of Care Review  Outcome: Progressing  Flowsheets (Taken 10/10/2024 1458)  Progress: improving  Plan of Care Reviewed With: patient  Outcome Evaluation: Patient has been pleasant and cooperative during shift. AOx4, ad hui, room air, SR. No complaints of pain, nausea or SOA. Replaced magnesium, phosphorus, and potassium. IVFs infusing. Urology consulted for gross hematuria. CIWA 0. Access has signed off. Clear liquid diet today. HTN. Will continue to monitor and assist patient as needed.  Goal: Patient-Specific Goal (Individualized)  Outcome: Progressing  Goal: Absence of Hospital-Acquired Illness or Injury  Outcome: Progressing  Intervention: Identify and Manage Fall Risk  Recent Flowsheet Documentation  Taken 10/10/2024 1400 by Arnold Tobias RN  Safety Promotion/Fall Prevention:   assistive device/personal items within reach   fall prevention program maintained   nonskid shoes/slippers when out of bed   safety round/check completed  Taken 10/10/2024 1200 by Arnold Tobias RN  Safety Promotion/Fall Prevention:   assistive device/personal items within reach   fall prevention program maintained   nonskid shoes/slippers when out of bed   safety round/check completed  Taken 10/10/2024 1000 by Arnold Tobias RN  Safety Promotion/Fall Prevention:   assistive device/personal items within reach   fall prevention program maintained   nonskid shoes/slippers when out of bed   safety round/check completed  Taken 10/10/2024 0827 by Arnold Tobias RN  Safety Promotion/Fall Prevention:   assistive device/personal items within reach   fall prevention program maintained   nonskid shoes/slippers when out of bed   safety round/check completed  Intervention: Prevent Skin Injury  Recent Flowsheet Documentation  Taken 10/10/2024 1400 by Arnold Tobias RN  Body Position: supine  Taken 10/10/2024 1200 by Arnold Tobias RN  Body Position: supine  Taken 10/10/2024 1000 by  Arnold Tobias RN  Body Position: supine  Taken 10/10/2024 0827 by Arnold Tobias RN  Body Position: supine  Intervention: Prevent and Manage VTE (Venous Thromboembolism) Risk  Recent Flowsheet Documentation  Taken 10/10/2024 1400 by Arnold Tobias RN  Activity Management: up ad hui  Taken 10/10/2024 1200 by Arnold Tobias RN  Activity Management: up ad hui  Taken 10/10/2024 1000 by Arnold Tobias RN  Activity Management: up ad hui  Taken 10/10/2024 0827 by Arnold Tobias RN  Activity Management: up ad hui  Goal: Optimal Comfort and Wellbeing  Outcome: Progressing  Goal: Readiness for Transition of Care  Outcome: Progressing     Problem: Hypertension Comorbidity  Goal: Blood Pressure in Desired Range  Outcome: Progressing     Problem: Fall Injury Risk  Goal: Absence of Fall and Fall-Related Injury  Outcome: Progressing  Intervention: Promote Injury-Free Environment  Recent Flowsheet Documentation  Taken 10/10/2024 1400 by Arnold Tobias RN  Safety Promotion/Fall Prevention:   assistive device/personal items within reach   fall prevention program maintained   nonskid shoes/slippers when out of bed   safety round/check completed  Taken 10/10/2024 1200 by Arnold Tobias RN  Safety Promotion/Fall Prevention:   assistive device/personal items within reach   fall prevention program maintained   nonskid shoes/slippers when out of bed   safety round/check completed  Taken 10/10/2024 1000 by Arnold Tobias RN  Safety Promotion/Fall Prevention:   assistive device/personal items within reach   fall prevention program maintained   nonskid shoes/slippers when out of bed   safety round/check completed  Taken 10/10/2024 0827 by Arnold Tobias RN  Safety Promotion/Fall Prevention:   assistive device/personal items within [facility]   fall prevention program maintained   nonskid shoes/slippers when out of bed   safety round/check completed     Problem: Activity and Energy Impairment (Excessive Substance  Use)  Goal: Optimized Energy Level (Excessive Substance Use)  Outcome: Progressing     Problem: Behavior Regulation Impairment (Excessive Substance Use)  Goal: Improved Behavioral Control (Excessive Substance Use)  Outcome: Progressing     Problem: Decreased Participation and Engagement (Excessive Substance Use)  Goal: Increased Participation and Engagement (Excessive Substance Use)  Outcome: Progressing     Problem: Physiologic Impairment (Excessive Substance Use)  Goal: Improved Physiologic Symptoms (Excessive Substance Use)  Outcome: Progressing     Problem: Social, Occupational or Functional Impairment (Excessive Substance Use)  Goal: Enhanced Social, Occupational or Functional Skills (Excessive Substance Use)  Outcome: Progressing     Problem: Pain Acute  Goal: Acceptable Pain Control and Functional Ability  Outcome: Progressing     Problem: Suicide Risk  Goal: Absence of Self-Harm  Outcome: Progressing     Problem: Adjustment to Illness (Bowel Disease, Inflammatory)  Goal: Optimal Adaptation to Chronic Illness  Outcome: Progressing     Problem: Diarrhea (Bowel Disease, Inflammatory)  Goal: Diarrhea Symptom Relief  Outcome: Progressing     Problem: Infection (Bowel Disease, Inflammatory)  Goal: Absence of Infection Signs and Symptoms  Outcome: Progressing     Problem: Nutrition Impaired (Bowel Disease, Inflammatory)  Goal: Optimal Nutrition  Outcome: Progressing     Problem: Pain (Bowel Disease, Inflammatory)  Goal: Acceptable Pain Control  Outcome: Progressing   Goal Outcome Evaluation:  Plan of Care Reviewed With: patient        Progress: improving  Outcome Evaluation: Patient has been pleasant and cooperative during shift. AOx4, ad hui, room air, SR. No complaints of pain, nausea or SOA. Replaced magnesium, phosphorus, and potassium. IVFs infusing. Urology consulted for gross hematuria. CIWA 0. Access has signed off. Clear liquid diet today. HTN. Will continue to monitor and assist patient as needed.

## 2024-10-11 LAB
ALBUMIN SERPL-MCNC: 3.3 G/DL (ref 3.5–5.2)
ALBUMIN/GLOB SERPL: 0.9 G/DL
ALP SERPL-CCNC: 78 U/L (ref 39–117)
ALT SERPL W P-5'-P-CCNC: 28 U/L (ref 1–33)
ANION GAP SERPL CALCULATED.3IONS-SCNC: 12.3 MMOL/L (ref 5–15)
AST SERPL-CCNC: 48 U/L (ref 1–32)
BILIRUB SERPL-MCNC: 0.7 MG/DL (ref 0–1.2)
BUN SERPL-MCNC: 3 MG/DL (ref 6–20)
BUN/CREAT SERPL: 7.3 (ref 7–25)
CALCIUM SPEC-SCNC: 9.4 MG/DL (ref 8.6–10.5)
CHLORIDE SERPL-SCNC: 100 MMOL/L (ref 98–107)
CO2 SERPL-SCNC: 24.7 MMOL/L (ref 22–29)
CREAT SERPL-MCNC: 0.41 MG/DL (ref 0.57–1)
DEPRECATED RDW RBC AUTO: 50.7 FL (ref 37–54)
EGFRCR SERPLBLD CKD-EPI 2021: 136.8 ML/MIN/1.73
ERYTHROCYTE [DISTWIDTH] IN BLOOD BY AUTOMATED COUNT: 14.4 % (ref 12.3–15.4)
GLOBULIN UR ELPH-MCNC: 3.6 GM/DL
GLUCOSE SERPL-MCNC: 94 MG/DL (ref 65–99)
HCT VFR BLD AUTO: 33.7 % (ref 34–46.6)
HGB BLD-MCNC: 11.4 G/DL (ref 12–15.9)
LIPASE SERPL-CCNC: 1200 U/L (ref 13–60)
MAGNESIUM SERPL-MCNC: 1.5 MG/DL (ref 1.6–2.6)
MCH RBC QN AUTO: 32.9 PG (ref 26.6–33)
MCHC RBC AUTO-ENTMCNC: 33.8 G/DL (ref 31.5–35.7)
MCV RBC AUTO: 97.1 FL (ref 79–97)
PHOSPHATE SERPL-MCNC: 3.4 MG/DL (ref 2.5–4.5)
PLATELET # BLD AUTO: 102 10*3/MM3 (ref 140–450)
PMV BLD AUTO: 10.7 FL (ref 6–12)
POTASSIUM SERPL-SCNC: 3.6 MMOL/L (ref 3.5–5.2)
PROT SERPL-MCNC: 6.9 G/DL (ref 6–8.5)
RBC # BLD AUTO: 3.47 10*6/MM3 (ref 3.77–5.28)
SODIUM SERPL-SCNC: 137 MMOL/L (ref 136–145)
WBC NRBC COR # BLD AUTO: 5.39 10*3/MM3 (ref 3.4–10.8)

## 2024-10-11 PROCEDURE — 83735 ASSAY OF MAGNESIUM: CPT | Performed by: STUDENT IN AN ORGANIZED HEALTH CARE EDUCATION/TRAINING PROGRAM

## 2024-10-11 PROCEDURE — 25010000002 CEFTRIAXONE PER 250 MG: Performed by: STUDENT IN AN ORGANIZED HEALTH CARE EDUCATION/TRAINING PROGRAM

## 2024-10-11 PROCEDURE — 84100 ASSAY OF PHOSPHORUS: CPT | Performed by: STUDENT IN AN ORGANIZED HEALTH CARE EDUCATION/TRAINING PROGRAM

## 2024-10-11 PROCEDURE — 85027 COMPLETE CBC AUTOMATED: CPT | Performed by: STUDENT IN AN ORGANIZED HEALTH CARE EDUCATION/TRAINING PROGRAM

## 2024-10-11 PROCEDURE — 83690 ASSAY OF LIPASE: CPT | Performed by: STUDENT IN AN ORGANIZED HEALTH CARE EDUCATION/TRAINING PROGRAM

## 2024-10-11 PROCEDURE — 25810000003 LACTATED RINGERS PER 1000 ML: Performed by: STUDENT IN AN ORGANIZED HEALTH CARE EDUCATION/TRAINING PROGRAM

## 2024-10-11 PROCEDURE — 80053 COMPREHEN METABOLIC PANEL: CPT | Performed by: STUDENT IN AN ORGANIZED HEALTH CARE EDUCATION/TRAINING PROGRAM

## 2024-10-11 RX ORDER — SODIUM CHLORIDE, SODIUM LACTATE, POTASSIUM CHLORIDE, CALCIUM CHLORIDE 600; 310; 30; 20 MG/100ML; MG/100ML; MG/100ML; MG/100ML
100 INJECTION, SOLUTION INTRAVENOUS CONTINUOUS
Status: ACTIVE | OUTPATIENT
Start: 2024-10-11 | End: 2024-10-11

## 2024-10-11 RX ORDER — POTASSIUM CHLORIDE 750 MG/1
40 TABLET, FILM COATED, EXTENDED RELEASE ORAL ONCE
Status: COMPLETED | OUTPATIENT
Start: 2024-10-11 | End: 2024-10-11

## 2024-10-11 RX ADMIN — POTASSIUM CHLORIDE 40 MEQ: 750 TABLET, EXTENDED RELEASE ORAL at 11:35

## 2024-10-11 RX ADMIN — MAGNESIUM OXIDE 400 MG (241.3 MG MAGNESIUM) TABLET 400 MG: TABLET at 19:54

## 2024-10-11 RX ADMIN — MAGNESIUM OXIDE 400 MG (241.3 MG MAGNESIUM) TABLET 400 MG: TABLET at 08:59

## 2024-10-11 RX ADMIN — SODIUM CHLORIDE, POTASSIUM CHLORIDE, SODIUM LACTATE AND CALCIUM CHLORIDE 100 ML/HR: 600; 310; 30; 20 INJECTION, SOLUTION INTRAVENOUS at 11:35

## 2024-10-11 RX ADMIN — Medication 10 ML: at 08:59

## 2024-10-11 RX ADMIN — HYDRALAZINE HYDROCHLORIDE 25 MG: 50 TABLET ORAL at 12:16

## 2024-10-11 RX ADMIN — FOLIC ACID 1 MG: 1 TABLET ORAL at 08:59

## 2024-10-11 RX ADMIN — Medication 10 ML: at 20:31

## 2024-10-11 RX ADMIN — CEFTRIAXONE SODIUM 1000 MG: 1 INJECTION, POWDER, FOR SOLUTION INTRAMUSCULAR; INTRAVENOUS at 11:35

## 2024-10-11 RX ADMIN — Medication 1 TABLET: at 08:59

## 2024-10-11 RX ADMIN — DIBASIC SODIUM PHOSPHATE, MONOBASIC POTASSIUM PHOSPHATE AND MONOBASIC SODIUM PHOSPHATE 2 TABLET: 852; 155; 130 TABLET ORAL at 19:54

## 2024-10-11 RX ADMIN — Medication 100 MG: at 08:58

## 2024-10-11 RX ADMIN — DIBASIC SODIUM PHOSPHATE, MONOBASIC POTASSIUM PHOSPHATE AND MONOBASIC SODIUM PHOSPHATE 2 TABLET: 852; 155; 130 TABLET ORAL at 08:59

## 2024-10-11 NOTE — PLAN OF CARE
Goal Outcome Evaluation:     Patient A&Ox4. CIWA 0. No complaints of pain or nausea this shift. NPO due to elevated lipase. Patient states she is hungry and eager to have diet restarted. Ad hui in room. Hypertensive - PRN hydralazine given. Medicated per orders. Plan of care ongoing.

## 2024-10-11 NOTE — CASE MANAGEMENT/SOCIAL WORK
Continued Stay Note  [facility]     Patient Name: Adriel Armenta  MRN: [id number]  Today's Date: 10/11/2024    Admit Date: 10/8/2024    Plan: Plan home.  MODESTO White RN   Discharge Plan       Row Name 10/11/24 1205       Plan    Plan Plan home.  MODESTO White RN    Plan Comments Pt is independent with ADLs. Pt denies using any DMEs.  Pt denies any discharge needs. Pt's father or a friend will assist pt at home if needed and will transport her home. Plan home. MODESTO White RN                   Discharge Codes    No documentation.                 Expected Discharge Date and Time       Expected Discharge Date Expected Discharge Time    Oct 14, 2024               Catrina White RN

## 2024-10-11 NOTE — PLAN OF CARE
Goal Outcome Evaluation:  Plan of Care Reviewed With: patient        Progress: improving  Outcome Evaluation: Pt alert and oriented. Up ad hui. VSS. On RA. No c/o pain. CIWA 0 this shift. No acute distress noted.                                Problem: Adult Inpatient Plan of Care  Goal: Plan of Care Review  Outcome: Resolved for upgrade, new template will be applied  Flowsheets (Taken 10/11/2024 1176)  Progress: improving  Plan of Care Reviewed With: patient  Outcome Evaluation: pt  Goal: Patient-Specific Goal (Individualized)  Outcome: Resolved for upgrade, new template will be applied  Goal: Absence of Hospital-Acquired Illness or Injury  Outcome: Resolved for upgrade, new template will be applied  Goal: Optimal Comfort and Wellbeing  Outcome: Resolved for upgrade, new template will be applied  Goal: Readiness for Transition of Care  Outcome: Resolved for upgrade, new template will be applied     Problem: Hypertension Comorbidity  Goal: Blood Pressure in Desired Range  Outcome: Resolved for upgrade, new template will be applied     Problem: Fall Injury Risk  Goal: Absence of Fall and Fall-Related Injury  Outcome: Resolved for upgrade, new template will be applied     Problem: Activity and Energy Impairment (Excessive Substance Use)  Goal: Optimized Energy Level (Excessive Substance Use)  Outcome: Resolved for upgrade, new template will be applied     Problem: Behavior Regulation Impairment (Excessive Substance Use)  Goal: Improved Behavioral Control (Excessive Substance Use)  Outcome: Resolved for upgrade, new template will be applied     Problem: Decreased Participation and Engagement (Excessive Substance Use)  Goal: Increased Participation and Engagement (Excessive Substance Use)  Outcome: Resolved for upgrade, new template will be applied     Problem: Physiologic Impairment (Excessive Substance Use)  Goal: Improved Physiologic Symptoms (Excessive Substance Use)  Outcome: Resolved for upgrade, new template will  be applied     Problem: Social, Occupational or Functional Impairment (Excessive Substance Use)  Goal: Enhanced Social, Occupational or Functional Skills (Excessive Substance Use)  Outcome: Resolved for upgrade, new template will be applied     Problem: Pain Acute  Goal: Acceptable Pain Control and Functional Ability  Outcome: Resolved for upgrade, new template will be applied     Problem: Suicide Risk  Goal: Absence of Self-Harm  Outcome: Resolved for upgrade, new template will be applied     Problem: Adjustment to Illness (Bowel Disease, Inflammatory)  Goal: Optimal Adaptation to Chronic Illness  Outcome: Resolved for upgrade, new template will be applied     Problem: Diarrhea (Bowel Disease, Inflammatory)  Goal: Diarrhea Symptom Relief  Outcome: Resolved for upgrade, new template will be applied     Problem: Infection (Bowel Disease, Inflammatory)  Goal: Absence of Infection Signs and Symptoms  Outcome: Resolved for upgrade, new template will be applied     Problem: Nutrition Impaired (Bowel Disease, Inflammatory)  Goal: Optimal Nutrition  Outcome: Resolved for upgrade, new template will be applied     Problem: Pain (Bowel Disease, Inflammatory)  Goal: Acceptable Pain Control  Outcome: Resolved for upgrade, new template will be applied

## 2024-10-11 NOTE — PROGRESS NOTES
Name: Adriel Armenta ADMIT: 10/8/2024   : 1995  PCP: Provider, No Known    MRN: [id number] LOS: 1 days   AGE/SEX: 28 y.o. female  ROOM: [facility]     Subjective   Subjective   No acute events overnight.  Patient reports she is feeling better.  Denies any abdominal pain currently, no nausea no vomiting, tolerating clear liquid diet.    Review of Systems   As above  Objective   Objective   Vital Signs  Temp:  [97.7 °F (36.5 °C)-98.3 °F (36.8 °C)] 98.1 °F (36.7 °C)  Heart Rate:  [] 86  Resp:  [16] 16  BP: (129-145)/() 145/107  SpO2:  [100 %] 100 %  on   ;   Device (Oxygen Therapy): room air  Body mass index is 22.64 kg/m².  Physical Exam    General: Alert, laying in bed, no distress.  HEENT: Normocephalic, atraumatic  CV: Regular rate and rhythm, no murmurs rubs or gallops  Lungs: Clear to auscultation bilaterally, no crackles or wheezes  Abdomen: Soft, nontender, mildly distended  Extremities: No significant peripheral edema , no cyanosis     Results Review     I reviewed the patient's new clinical results.  Results from last 7 days   Lab Units 10/11/24  0517 10/10/24  0444 10/09/24  0536 10/08/24  1538   WBC 10*3/mm3 5.39 5.64 7.95 11.73*   HEMOGLOBIN g/dL 11.4* 11.5* 10.8* 15.1   PLATELETS 10*3/mm3 102* 82* 98* 162     Results from last 7 days   Lab Units 10/11/24  0517 10/10/24  0444 10/09/24  0536 10/08/24  1538   SODIUM mmol/L 137 135* 133* 131*   POTASSIUM mmol/L 3.6 3.3* 3.6 3.8   CHLORIDE mmol/L 100 100 100 87*   CO2 mmol/L 24.7 24.2 20.0* 22.0   BUN mg/dL 3* 6 17 28*   CREATININE mg/dL 0.41* 0.54* 0.69 1.13*   GLUCOSE mg/dL 94 87 72 91   Estimated Creatinine Clearance: 222.1 mL/min (A) (by C-G formula based on SCr of 0.41 mg/dL (L)).  Results from last 7 days   Lab Units 10/11/24  0517 10/10/24  0444 10/09/24  0536 10/08/24  1538   ALBUMIN g/dL 3.3* 3.3* 3.4* 5.4*   BILIRUBIN mg/dL 0.7 1.0 0.9 1.5*   ALK PHOS U/L 78 80 90 140*   AST (SGOT) U/L 48* 50* 46* 65*   ALT (SGPT) U/L 28 27 27  "41*     Results from last 7 days   Lab Units 10/11/24  0517 10/10/24  0444 10/09/24  1756 10/09/24  0536 10/08/24  1538   CALCIUM mg/dL 9.4 9.1  --  9.4 12.1*   ALBUMIN g/dL 3.3* 3.3*  --  3.4* 5.4*   MAGNESIUM mg/dL 1.5* 1.5*  --  2.0 2.3   PHOSPHORUS mg/dL 3.4 2.6 3.9 1.3*  --      Results from last 7 days   Lab Units 10/08/24  1538   LACTATE mmol/L 1.5   No results found for: \"COVID19\"  No results found for: \"HGBA1C\", \"POCGLU\"        CT Abdomen Pelvis Without Contrast  Narrative: CT ABDOMEN AND PELVIS WITHOUT IV CONTRAST     HISTORY: Abdominal pain, nausea and vomiting     TECHNIQUE: Radiation dose reduction techniques were utilized, including  automated exposure control and exposure modulation based on body size.  Axial images were obtained through the abdomen and pelvis without the  administration of IV contrast. Coronal and sagittal reformatted images  were obtained.     COMPARISON: 4/26/2024     FINDINGS:      ABDOMEN:  There is some mild atelectasis in the right lung base. There is diffuse  fatty infiltration of the liver. There is some layering high attenuation  material in the gallbladder which may reflect sludge and/or stones. The  spleen is normal in size. Bilateral renal cysts are identified. Some of  these are smaller. One of them is larger and increased in density  located in the right midpole region likely reflecting a hemorrhagic  cyst. It now measures about 3.7 cm, previously about 2.3 cm. There are  multiple nonobstructing bilateral kidney stones. The adrenal glands are  unremarkable. There is some inflammatory stranding around the head of  the pancreas suggesting pancreatitis. Suggest correlation with lipase  levels. There is no fluid collection or abscess.     PELVIS:  The bladder is unremarkable. There is a 3.3 cm right ovarian cyst. No  free fluid is seen in the pelvis. Appendix not visualized. Colon  unremarkable. No acute bony abnormality     Impression: 1. There is some inflammatory " stranding around the head of the pancreas  suggesting acute pancreatitis. There is no fluid collection or abscess  2. There are bilateral nonobstructing kidney stones  3. There are bilateral renal cysts as described     Radiation dose reduction techniques were utilized, including automated  exposure control and exposure modulation based on body size.        This report was finalized on 10/8/2024 4:54 PM by Dr. Jermaine France M.D on Workstation: [id number]       Scheduled Medications  cefTRIAXone, 1,000 mg, Intravenous, Q24H  folic acid, 1 mg, Oral, Daily  magnesium oxide, 400 mg, Oral, BID  multivitamin, 1 tablet, Oral, Daily  phosphorus, 500 mg, Oral, BID  potassium chloride, 40 mEq, Oral, Once  sodium chloride, 10 mL, Intravenous, Q12H  thiamine, 100 mg, Oral, Daily    Infusions  lactated ringers, 100 mL/hr    Diet  NPO Diet NPO Type: Sips with Meds    I have personally reviewed     [x]  Laboratory   [x]  Microbiology   []  Radiology   []  EKG/Telemetry  []  Cardiology/Vascular   []  Pathology    []  Records       Assessment/Plan     Active Hospital Problems    Diagnosis  POA    **Acute alcoholic pancreatitis [K85.20]  Yes    Alcohol use disorder [F10.90]  Yes    Cocaine use disorder [F14.10]  Yes    Hepatic steatosis [K76.0]  Yes    Hyponatremia [E87.1]  Yes      Resolved Hospital Problems   No resolved problems to display.       28 y.o. female with Acute alcoholic pancreatitis.     Acute pancreatitis  - CT c/w pancreatitis, no abscess/fluid collection  -Lipase on admission was 917, initially improved to 777 but now trending back up  - pain meds as needed  -Lipase 1200 this morning  -Patient clinically improving, denies abdominal pain currently, however lipase continues to trend up.  Will switch diet back to n.p.o except meds.,  reinitiated IV fluids, okay for p.o. fluid intake.  -If develops worsening abdominal pain and lipase continue to trend up, may repeat  imaging  -Monitor daily  CMP/lipase.      Alcohol use disorder  Severe heaptic steatosis  Mild elevation in LFTs  - Etoh neg in ED  - continue stroke protocol, multivitamins  - access consult  -LFTs improving     Asymptomatic bacteria  Gross hematuria  -UA collected in the ER did show nitrites leuk esterase and bacteria but also 7-12 squamous epithelial cells consistent with contamination, she denies any dysuria or frequency  -Urine cx growing E. Coli  -Urology evaluated.  Recommended culture positive antibiotics, initiated on IV ceftriaxone for 3 days 10/11/2024     Hyponatremia  -related to alcohol use likely, improved today   -Monitor, stable/improved       Hypophosphatemia/hypokalemia/hypomagnesemia  -Potassium/phosphorus normal, magnesium low at 1.5.  Continue replacement.    Cocaine use disorder  - access evaluated         SCDs for DVT prophylaxis.  Full code.  Discussed with patient.  Disposition: To be determined pending repeat lipase levels and clinical progress.  Hopefully stable to be discharged in 1 to 2 days.  Expected Discharge Date: 10/14/2024; Expected Discharge Time:        Copied text in this note has been reviewed and is accurate as of 10/11/24.         Dictated utilizing Dragon dictation        Alicia Clark MD  [facility]  10/11/24  09:28 EDT

## 2024-10-11 NOTE — PROGRESS NOTES
"Nutrition Services    Patient Name:  Adriel Armenta  YOB: 1995  MRN: [id number]  Admit Date:  10/8/2024Nutrition Services    Patient Name: Adriel Armenta  YOB: 1995  MRN: [id number]  Admission date: 10/8/2024    PROGRESS NOTE      Encounter Information: Acute alcoholic pancreatitis   Pt had been tolerating clear liquids, but MD made her NPO this morning d/t lipase trending up. Ok for PO fluids   Denies abdominal pain, N/V        PO Diet: NPO Diet NPO Type: Sips with Meds   PO Supplements: na   PO Intake:  NPO        Current nutrition support: NPO    Nutrition support review: Can start boost breeze again once clear diet resumes        Labs (reviewed below): AST 48, ALT 28, Mg 1.5, PO4 3.4       GI Function:  BM 10/10       Nutrition Intervention Updates: ADAT per MD   Resume boost breeze with clear liquid initiation        Results from last 7 days   Lab Units 10/11/24  0517 10/10/24  0444 10/09/24  0536   SODIUM mmol/L 137 135* 133*   POTASSIUM mmol/L 3.6 3.3* 3.6   CHLORIDE mmol/L 100 100 100   CO2 mmol/L 24.7 24.2 20.0*   BUN mg/dL 3* 6 17   CREATININE mg/dL 0.41* 0.54* 0.69   CALCIUM mg/dL 9.4 9.1 9.4   BILIRUBIN mg/dL 0.7 1.0 0.9   ALK PHOS U/L 78 80 90   ALT (SGPT) U/L 28 27 27   AST (SGOT) U/L 48* 50* 46*   GLUCOSE mg/dL 94 87 72     Results from last 7 days   Lab Units 10/11/24  0517 10/10/24  0444 10/09/24  1756 10/09/24  0536   MAGNESIUM mg/dL 1.5* 1.5*  --  2.0   PHOSPHORUS mg/dL 3.4 2.6   < > 1.3*   HEMOGLOBIN g/dL 11.4* 11.5*  --  10.8*   HEMATOCRIT % 33.7* 33.1*  --  32.6*    < > = values in this interval not displayed.     No results found for: \"COVID19\"  No results found for: \"HGBA1C\"      RD to follow up per protocol.    Electronically signed by:  Leia Moreno RD  10/11/24 12:27 EDT  "

## 2024-10-12 LAB
ALBUMIN SERPL-MCNC: 3.3 G/DL (ref 3.5–5.2)
ALBUMIN/GLOB SERPL: 0.9 G/DL
ALP SERPL-CCNC: 79 U/L (ref 39–117)
ALT SERPL W P-5'-P-CCNC: 30 U/L (ref 1–33)
ANION GAP SERPL CALCULATED.3IONS-SCNC: 7 MMOL/L (ref 5–15)
AST SERPL-CCNC: 38 U/L (ref 1–32)
BILIRUB SERPL-MCNC: 0.6 MG/DL (ref 0–1.2)
BUN SERPL-MCNC: 4 MG/DL (ref 6–20)
BUN/CREAT SERPL: 6.8 (ref 7–25)
CALCIUM SPEC-SCNC: 9.7 MG/DL (ref 8.6–10.5)
CHLORIDE SERPL-SCNC: 100 MMOL/L (ref 98–107)
CO2 SERPL-SCNC: 30 MMOL/L (ref 22–29)
CREAT SERPL-MCNC: 0.59 MG/DL (ref 0.57–1)
DEPRECATED RDW RBC AUTO: 49.8 FL (ref 37–54)
EGFRCR SERPLBLD CKD-EPI 2021: 125.3 ML/MIN/1.73
ERYTHROCYTE [DISTWIDTH] IN BLOOD BY AUTOMATED COUNT: 14.4 % (ref 12.3–15.4)
GLOBULIN UR ELPH-MCNC: 3.6 GM/DL
GLUCOSE SERPL-MCNC: 98 MG/DL (ref 65–99)
HCT VFR BLD AUTO: 32.4 % (ref 34–46.6)
HGB BLD-MCNC: 11.1 G/DL (ref 12–15.9)
LIPASE SERPL-CCNC: 1196 U/L (ref 13–60)
MAGNESIUM SERPL-MCNC: 1.6 MG/DL (ref 1.6–2.6)
MCH RBC QN AUTO: 32.7 PG (ref 26.6–33)
MCHC RBC AUTO-ENTMCNC: 34.3 G/DL (ref 31.5–35.7)
MCV RBC AUTO: 95.6 FL (ref 79–97)
PHOSPHATE SERPL-MCNC: 3.8 MG/DL (ref 2.5–4.5)
PLATELET # BLD AUTO: 115 10*3/MM3 (ref 140–450)
PMV BLD AUTO: 10.3 FL (ref 6–12)
POTASSIUM SERPL-SCNC: 3.4 MMOL/L (ref 3.5–5.2)
PROT SERPL-MCNC: 6.9 G/DL (ref 6–8.5)
RBC # BLD AUTO: 3.39 10*6/MM3 (ref 3.77–5.28)
SODIUM SERPL-SCNC: 137 MMOL/L (ref 136–145)
WBC NRBC COR # BLD AUTO: 5.72 10*3/MM3 (ref 3.4–10.8)

## 2024-10-12 PROCEDURE — 83735 ASSAY OF MAGNESIUM: CPT | Performed by: STUDENT IN AN ORGANIZED HEALTH CARE EDUCATION/TRAINING PROGRAM

## 2024-10-12 PROCEDURE — 80053 COMPREHEN METABOLIC PANEL: CPT | Performed by: STUDENT IN AN ORGANIZED HEALTH CARE EDUCATION/TRAINING PROGRAM

## 2024-10-12 PROCEDURE — 25010000002 CEFTRIAXONE PER 250 MG: Performed by: STUDENT IN AN ORGANIZED HEALTH CARE EDUCATION/TRAINING PROGRAM

## 2024-10-12 PROCEDURE — 85027 COMPLETE CBC AUTOMATED: CPT | Performed by: STUDENT IN AN ORGANIZED HEALTH CARE EDUCATION/TRAINING PROGRAM

## 2024-10-12 PROCEDURE — 83690 ASSAY OF LIPASE: CPT | Performed by: STUDENT IN AN ORGANIZED HEALTH CARE EDUCATION/TRAINING PROGRAM

## 2024-10-12 PROCEDURE — 84100 ASSAY OF PHOSPHORUS: CPT | Performed by: STUDENT IN AN ORGANIZED HEALTH CARE EDUCATION/TRAINING PROGRAM

## 2024-10-12 RX ORDER — AMLODIPINE BESYLATE 2.5 MG/1
2.5 TABLET ORAL
Status: DISCONTINUED | OUTPATIENT
Start: 2024-10-12 | End: 2024-10-13 | Stop reason: HOSPADM

## 2024-10-12 RX ORDER — SODIUM CHLORIDE 450 MG/100ML
100 INJECTION, SOLUTION INTRAVENOUS CONTINUOUS
Status: ACTIVE | OUTPATIENT
Start: 2024-10-12 | End: 2024-10-12

## 2024-10-12 RX ORDER — POTASSIUM CHLORIDE 750 MG/1
40 TABLET, FILM COATED, EXTENDED RELEASE ORAL EVERY 4 HOURS
Status: COMPLETED | OUTPATIENT
Start: 2024-10-12 | End: 2024-10-12

## 2024-10-12 RX ADMIN — CEFTRIAXONE SODIUM 1000 MG: 1 INJECTION, POWDER, FOR SOLUTION INTRAMUSCULAR; INTRAVENOUS at 10:24

## 2024-10-12 RX ADMIN — Medication 10 ML: at 20:19

## 2024-10-12 RX ADMIN — Medication 100 MG: at 08:37

## 2024-10-12 RX ADMIN — Medication 1 TABLET: at 08:37

## 2024-10-12 RX ADMIN — POTASSIUM CHLORIDE 40 MEQ: 750 TABLET, EXTENDED RELEASE ORAL at 08:37

## 2024-10-12 RX ADMIN — FOLIC ACID 1 MG: 1 TABLET ORAL at 08:37

## 2024-10-12 RX ADMIN — MAGNESIUM OXIDE 400 MG (241.3 MG MAGNESIUM) TABLET 400 MG: TABLET at 10:24

## 2024-10-12 RX ADMIN — POTASSIUM CHLORIDE 40 MEQ: 750 TABLET, EXTENDED RELEASE ORAL at 12:16

## 2024-10-12 RX ADMIN — SODIUM CHLORIDE 100 ML/HR: 4.5 INJECTION, SOLUTION INTRAVENOUS at 10:24

## 2024-10-12 RX ADMIN — AMLODIPINE BESYLATE 2.5 MG: 2.5 TABLET ORAL at 10:24

## 2024-10-12 RX ADMIN — MAGNESIUM OXIDE 400 MG (241.3 MG MAGNESIUM) TABLET 400 MG: TABLET at 20:19

## 2024-10-12 RX ADMIN — Medication 10 ML: at 08:37

## 2024-10-12 NOTE — PROGRESS NOTES
Name: Adriel Armenta ADMIT: 10/8/2024   : 1995  PCP: Provider, No Known    MRN: [id number] LOS: 2 days   AGE/SEX: 28 y.o. female  ROOM: [facility]     Subjective   Subjective   Patient seen this morning, continues overnight.  Was n.p.o. since yesterday due to lipase trending up, lipase remained stable today around 1200.  Denies abdominal pain, nausea or vomiting.  Feels hungry and wants to eat.  Review of Systems   As above  Objective   Objective   Vital Signs  Temp:  [97.8 °F (36.6 °C)-98.1 °F (36.7 °C)] 98 °F (36.7 °C)  Heart Rate:  [80-92] 91  Resp:  [16] 16  BP: (124-145)/() 141/104  SpO2:  [99 %-100 %] 100 %  on   ;   Device (Oxygen Therapy): room air  Body mass index is 22.64 kg/m².  Physical Exam    General: Alert, oriented x 3, laying in bed, no distress.  HEENT: Normocephalic, atraumatic  CV: Regular rate and rhythm, no murmurs rubs or gallops  Lungs: Clear to auscultation bilaterally, no crackles or wheezes  Abdomen: Soft, nontender, mildly distended  Extremities: No significant peripheral edema , no cyanosis     Results Review     I reviewed the patient's new clinical results.  Results from last 7 days   Lab Units 10/12/24  0426 10/11/24  0517 10/10/24  0444 10/09/24  0536   WBC 10*3/mm3 5.72 5.39 5.64 7.95   HEMOGLOBIN g/dL 11.1* 11.4* 11.5* 10.8*   PLATELETS 10*3/mm3 115* 102* 82* 98*     Results from last 7 days   Lab Units 10/12/24  0426 10/11/24  0517 10/10/24  0444 10/09/24  0536   SODIUM mmol/L 137 137 135* 133*   POTASSIUM mmol/L 3.4* 3.6 3.3* 3.6   CHLORIDE mmol/L 100 100 100 100   CO2 mmol/L 30.0* 24.7 24.2 20.0*   BUN mg/dL 4* 3* 6 17   CREATININE mg/dL 0.59 0.41* 0.54* 0.69   GLUCOSE mg/dL 98 94 87 72   Estimated Creatinine Clearance: 154.4 mL/min (by C-G formula based on SCr of 0.59 mg/dL).  Results from last 7 days   Lab Units 10/12/24  0426 10/11/24  0517 10/10/24  0444 10/09/24  0536   ALBUMIN g/dL 3.3* 3.3* 3.3* 3.4*   BILIRUBIN mg/dL 0.6 0.7 1.0 0.9   ALK PHOS U/L 79 78  "80 90   AST (SGOT) U/L 38* 48* 50* 46*   ALT (SGPT) U/L 30 28 27 27     Results from last 7 days   Lab Units 10/12/24  0426 10/11/24  0517 10/10/24  0444 10/09/24  1756 10/09/24  0536   CALCIUM mg/dL 9.7 9.4 9.1  --  9.4   ALBUMIN g/dL 3.3* 3.3* 3.3*  --  3.4*   MAGNESIUM mg/dL 1.6 1.5* 1.5*  --  2.0   PHOSPHORUS mg/dL 3.8 3.4 2.6 3.9 1.3*     Results from last 7 days   Lab Units 10/08/24  1538   LACTATE mmol/L 1.5   No results found for: \"COVID19\"  No results found for: \"HGBA1C\", \"POCGLU\"        CT Abdomen Pelvis Without Contrast  Narrative: CT ABDOMEN AND PELVIS WITHOUT IV CONTRAST     HISTORY: Abdominal pain, nausea and vomiting     TECHNIQUE: Radiation dose reduction techniques were utilized, including  automated exposure control and exposure modulation based on body size.  Axial images were obtained through the abdomen and pelvis without the  administration of IV contrast. Coronal and sagittal reformatted images  were obtained.     COMPARISON: 4/26/2024     FINDINGS:      ABDOMEN:  There is some mild atelectasis in the right lung base. There is diffuse  fatty infiltration of the liver. There is some layering high attenuation  material in the gallbladder which may reflect sludge and/or stones. The  spleen is normal in size. Bilateral renal cysts are identified. Some of  these are smaller. One of them is larger and increased in density  located in the right midpole region likely reflecting a hemorrhagic  cyst. It now measures about 3.7 cm, previously about 2.3 cm. There are  multiple nonobstructing bilateral kidney stones. The adrenal glands are  unremarkable. There is some inflammatory stranding around the head of  the pancreas suggesting pancreatitis. Suggest correlation with lipase  levels. There is no fluid collection or abscess.     PELVIS:  The bladder is unremarkable. There is a 3.3 cm right ovarian cyst. No  free fluid is seen in the pelvis. Appendix not visualized. Colon  unremarkable. No acute bony " abnormality     Impression: 1. There is some inflammatory stranding around the head of the pancreas  suggesting acute pancreatitis. There is no fluid collection or abscess  2. There are bilateral nonobstructing kidney stones  3. There are bilateral renal cysts as described     Radiation dose reduction techniques were utilized, including automated  exposure control and exposure modulation based on body size.        This report was finalized on 10/8/2024 4:54 PM by Dr. Jermaine France M.D on Workstation: [id number]       Scheduled Medications  amLODIPine, 2.5 mg, Oral, Q24H  cefTRIAXone, 1,000 mg, Intravenous, Q24H  folic acid, 1 mg, Oral, Daily  magnesium oxide, 400 mg, Oral, BID  multivitamin, 1 tablet, Oral, Daily  potassium chloride, 40 mEq, Oral, Q4H  sodium chloride, 10 mL, Intravenous, Q12H  thiamine, 100 mg, Oral, Daily    Infusions     Diet  Diet: Liquid; Clear Liquid; Fluid Consistency: Thin (IDDSI 0)    I have personally reviewed     [x]  Laboratory   []  Microbiology   []  Radiology   []  EKG/Telemetry  []  Cardiology/Vascular   []  Pathology    []  Records       Assessment/Plan     Active Hospital Problems    Diagnosis  POA    **Acute alcoholic pancreatitis [K85.20]  Yes    Alcohol use disorder [F10.90]  Yes    Cocaine use disorder [F14.10]  Yes    Hepatic steatosis [K76.0]  Yes    Hyponatremia [E87.1]  Yes      Resolved Hospital Problems   No resolved problems to display.       28 y.o. female with Acute alcoholic pancreatitis.     Acute pancreatitis  - CT c/w pancreatitis, no abscess/fluid collection  -Lipase on admission was 917, initially improved to 777 but now trending back up  - pain meds as needed  -Lipase remained stable around 1200  -Patient denies any abdominal symptoms.  Resume clear liquid diet.  1 L of IV fluids for 10 hours.  -Monitor daily CMP, lipase.  -If lipase remains stable/improved, tolerating advance diet, will likely discharge tomorrow.      Alcohol use disorder  Severe heaptic  steatosis  Mild elevation in LFTs  - Etoh neg in ED  - continue stroke protocol, multivitamins  - access consult  -LFTs improving     Asymptomatic bacteria  Gross hematuria  -UA collected in the ER did show nitrites leuk esterase and bacteria but also 7-12 squamous epithelial cells consistent with contamination, she denies any dysuria or frequency  -Urine cx growing E. Coli  -Urology evaluated.  Recommended culture positive antibiotics, initiated on IV ceftriaxone for 3 days, =0/11/2024     Hyponatremia  -related to alcohol use likely, improved today   -BNL.       Hypophosphatemia/hypokalemia/hypomagnesemia  - potassium low,  magnesium low end of normal, phosphorus WNL  -Replacement protocol, repeat in a.m.    Cocaine use disorder  - access evaluated         SCDs for DVT prophylaxis.  Full code.  Discussed with patient.  Disposition: Hopefully tomorrow if lipase stable and no abdominal symptoms.  Expected Discharge Date: 10/13/2024; Expected Discharge Time:        Copied text in this note has been reviewed and is accurate as of 10/12/24.         Dictated utilizing Dragon dictation        Alicia Clark MD  [facility]  10/12/24  09:30 EDT

## 2024-10-12 NOTE — PLAN OF CARE
Goal Outcome Evaluation:      Pt AOx4. On RA. NSR on monitor. Up ad hui in room. Started on 1 L 0.45% NaCl for pancreatitis. No c/o pain. Started on clear and advanced to full liquid diet. K+ and mag replaced per orders. Pt not in acute distress. Plan of care ongoing

## 2024-10-12 NOTE — PLAN OF CARE
Goal Outcome Evaluation:  Plan of Care Reviewed With: patient        Progress: improving    Outcome Evaluation: Pt resting in bed at this time. VSS. On RA. No acute distress noted.

## 2024-10-13 ENCOUNTER — READMISSION MANAGEMENT (OUTPATIENT)
Dept: CALL CENTER | Facility: HOSPITAL | Age: 29
End: 2024-10-13
Payer: MEDICAID

## 2024-10-13 VITALS
RESPIRATION RATE: 18 BRPM | BODY MASS INDEX: 22.71 KG/M2 | HEART RATE: 76 BPM | TEMPERATURE: 98.2 F | DIASTOLIC BLOOD PRESSURE: 91 MMHG | WEIGHT: 153.3 LBS | OXYGEN SATURATION: 100 % | HEIGHT: 69 IN | SYSTOLIC BLOOD PRESSURE: 119 MMHG

## 2024-10-13 LAB
ALBUMIN SERPL-MCNC: 3.4 G/DL (ref 3.5–5.2)
ALBUMIN/GLOB SERPL: 1 G/DL
ALP SERPL-CCNC: 78 U/L (ref 39–117)
ALT SERPL W P-5'-P-CCNC: 25 U/L (ref 1–33)
ANION GAP SERPL CALCULATED.3IONS-SCNC: 8 MMOL/L (ref 5–15)
AST SERPL-CCNC: 24 U/L (ref 1–32)
BILIRUB SERPL-MCNC: 0.4 MG/DL (ref 0–1.2)
BUN SERPL-MCNC: 4 MG/DL (ref 6–20)
BUN/CREAT SERPL: 6.9 (ref 7–25)
CALCIUM SPEC-SCNC: 9.7 MG/DL (ref 8.6–10.5)
CHLORIDE SERPL-SCNC: 102 MMOL/L (ref 98–107)
CO2 SERPL-SCNC: 27 MMOL/L (ref 22–29)
CREAT SERPL-MCNC: 0.58 MG/DL (ref 0.57–1)
DEPRECATED RDW RBC AUTO: 52.8 FL (ref 37–54)
EGFRCR SERPLBLD CKD-EPI 2021: 125.8 ML/MIN/1.73
ERYTHROCYTE [DISTWIDTH] IN BLOOD BY AUTOMATED COUNT: 14.6 % (ref 12.3–15.4)
GLOBULIN UR ELPH-MCNC: 3.4 GM/DL
GLUCOSE SERPL-MCNC: 93 MG/DL (ref 65–99)
HCT VFR BLD AUTO: 33.1 % (ref 34–46.6)
HGB BLD-MCNC: 11.3 G/DL (ref 12–15.9)
LIPASE SERPL-CCNC: 1054 U/L (ref 13–60)
MAGNESIUM SERPL-MCNC: 1.7 MG/DL (ref 1.6–2.6)
MCH RBC QN AUTO: 33.3 PG (ref 26.6–33)
MCHC RBC AUTO-ENTMCNC: 34.1 G/DL (ref 31.5–35.7)
MCV RBC AUTO: 97.6 FL (ref 79–97)
PHOSPHATE SERPL-MCNC: 3.6 MG/DL (ref 2.5–4.5)
PLATELET # BLD AUTO: 155 10*3/MM3 (ref 140–450)
PMV BLD AUTO: 9.6 FL (ref 6–12)
POTASSIUM SERPL-SCNC: 3.8 MMOL/L (ref 3.5–5.2)
PROT SERPL-MCNC: 6.8 G/DL (ref 6–8.5)
RBC # BLD AUTO: 3.39 10*6/MM3 (ref 3.77–5.28)
SODIUM SERPL-SCNC: 137 MMOL/L (ref 136–145)
WBC NRBC COR # BLD AUTO: 6.62 10*3/MM3 (ref 3.4–10.8)

## 2024-10-13 PROCEDURE — 83735 ASSAY OF MAGNESIUM: CPT | Performed by: STUDENT IN AN ORGANIZED HEALTH CARE EDUCATION/TRAINING PROGRAM

## 2024-10-13 PROCEDURE — 83690 ASSAY OF LIPASE: CPT | Performed by: STUDENT IN AN ORGANIZED HEALTH CARE EDUCATION/TRAINING PROGRAM

## 2024-10-13 PROCEDURE — 85027 COMPLETE CBC AUTOMATED: CPT | Performed by: STUDENT IN AN ORGANIZED HEALTH CARE EDUCATION/TRAINING PROGRAM

## 2024-10-13 PROCEDURE — 80053 COMPREHEN METABOLIC PANEL: CPT | Performed by: STUDENT IN AN ORGANIZED HEALTH CARE EDUCATION/TRAINING PROGRAM

## 2024-10-13 PROCEDURE — 84100 ASSAY OF PHOSPHORUS: CPT | Performed by: STUDENT IN AN ORGANIZED HEALTH CARE EDUCATION/TRAINING PROGRAM

## 2024-10-13 PROCEDURE — 25010000002 CEFTRIAXONE PER 250 MG: Performed by: STUDENT IN AN ORGANIZED HEALTH CARE EDUCATION/TRAINING PROGRAM

## 2024-10-13 RX ORDER — AMLODIPINE BESYLATE 2.5 MG/1
2.5 TABLET ORAL
Qty: 30 TABLET | Refills: 0 | Status: SHIPPED | OUTPATIENT
Start: 2024-10-13 | End: 2024-11-12

## 2024-10-13 RX ORDER — CEFDINIR 300 MG/1
300 CAPSULE ORAL 2 TIMES DAILY
Qty: 4 CAPSULE | Refills: 0 | Status: SHIPPED | OUTPATIENT
Start: 2024-10-15 | End: 2024-10-17

## 2024-10-13 RX ADMIN — Medication 10 ML: at 08:35

## 2024-10-13 RX ADMIN — Medication 100 MG: at 08:35

## 2024-10-13 RX ADMIN — FOLIC ACID 1 MG: 1 TABLET ORAL at 08:35

## 2024-10-13 RX ADMIN — AMLODIPINE BESYLATE 2.5 MG: 2.5 TABLET ORAL at 08:35

## 2024-10-13 RX ADMIN — MAGNESIUM OXIDE 400 MG (241.3 MG MAGNESIUM) TABLET 400 MG: TABLET at 08:35

## 2024-10-13 RX ADMIN — CEFTRIAXONE SODIUM 1000 MG: 1 INJECTION, POWDER, FOR SOLUTION INTRAMUSCULAR; INTRAVENOUS at 08:34

## 2024-10-13 RX ADMIN — Medication 1 TABLET: at 08:35

## 2024-10-13 NOTE — DISCHARGE INSTRUCTIONS
Notify your primary care provider if you experience chest pain, difficulty breathing, fevers/chills, nausea or vomiting, bleeding in stool, excessive diarrhea, numbness or weakness or tingling in any part of your body.    Hypertension.  Monitor blood pressure daily and record, follow-up with blood pressure log with PCP.

## 2024-10-13 NOTE — DISCHARGE SUMMARY
"    Patient Name: Adriel Armenta  : 1995  MRN: [id number]    Date of Admission: 10/8/2024  Date of Discharge:  10/13/2024  Primary Care Physician: Provider, No Known      Chief Complaint:   Abdominal Pain      Discharge Diagnoses     Active Hospital Problems    Diagnosis  POA    **Acute alcoholic pancreatitis [K85.20]  Yes    Alcohol use disorder [F10.90]  Yes    Cocaine use disorder [F14.10]  Yes    Hepatic steatosis [K76.0]  Yes    Hyponatremia [E87.1]  Yes      Resolved Hospital Problems   No resolved problems to display.      Patient is a 28-year-old who reports developing severe abdominal pain starting about 3 days ago. She says that she has been nauseated and \"cannot keep anything down including water.\"     Acute pancreatitis  Alcohol use disorder  Severe heaptic steatosis  Mild elevation in LFTs  -CT c/w pancreatitis, no abscess/fluid collection  Was initiated on IV fluids, antiemetics, pain management.  CIWA protocol and multivitamins for alcohol use disorder.  -Lipase on admission was 917, initially improved to 777 however titrated back up to 1200, but seem abdominal symptoms were improving.  Was not n.p.o., given additional IV fluids.  Lipase improved, and diet was resumed.  Abdominal pain essentially resolved, patient was tolerating advance diet with no nausea or vomiting.  Lipase improved to 1054 prior to discharge.  LFTs normalized.  -Access had evaluated,Patient was counseled on alcohol cessation.       Asymptomatic bacteria  Gross hematuria  Complex renal cyst  History of hydrocalycosis  -UA collected in the ER did show nitrites leuk esterase and bacteria but also 7-12 squamous epithelial cells consistent with contamination, she denies any dysuria or frequency  -Urine grew E. coli.  -Urology evaluated and  Recommended antibiotics in the setting of gross hematuria.  Was treated with IV ceftriaxone for 3 days, and discharged on cefdinir for 2 more days to complete 5-day course of antibiotics.  " Gross hematuria resolved prior to discharge.   -Patient will need MRI and cystoscopy as previously planned during previous outpatient appointment with urology.  Urology to arrange.    Hypertension.  -Blood pressure remains elevated despite improvement of symptoms and resolution of abdominal pain.  Initiated on amlodipine 2.5 mg daily blood pressure improved.  Continued at discharge.      Cocaine use disorder  -Access evaluated.  Cessation recommended.    At the time of discharge patient was told to take all medications as prescribed, keep all follow-up appointments, and call their doctor or return to the hospital with any worsening or concerning symptoms.               Day of Discharge     Subjective:  No new events overnight.  Laying in bed.  Tolerating advance diet.  Nausea, vomiting.  Denies any abdominal pain.    Physical Exam:  Temp:  [97.9 °F (36.6 °C)-98.2 °F (36.8 °C)] 98.2 °F (36.8 °C)  Heart Rate:  [] 76  Resp:  [16-18] 18  BP: (119-128)/(90-93) 119/91  Body mass index is 22.64 kg/m².  Physical Exam    General: Alert and oriented x3, no acute distress  HEENT: Normocephalic, atraumatic  CV: Regular rate and rhythm, no murmurs rubs or gallops  Lungs: Clear to auscultation bilaterally, no crackles or wheezes  Abdomen: Soft, nontender, nondistended  Extremities: No significant peripheral edema , no cyanosis     Consultants     Consult Orders (all) (From admission, onward)       Start     Ordered    10/10/24 1135  Inpatient Urology Consult  Once        Specialty:  Urology  Provider:  Russell Mcfarlane Jr., MD    10/10/24 1134    10/09/24 0450  Inpatient Case Management  Consult  Once        Provider:  (Not yet assigned)    10/09/24 0450    10/09/24 0450  Inpatient Nutrition Consult  Once        Provider:  (Not yet assigned)    10/09/24 0450    10/08/24 1847  Inpatient Access Center Consult  Once        Comments: Alcohol and drug use- specifically cocaine   Provider:  (Not yet assigned)     10/08/24 1846    10/08/24 1812  [facility] (on-call MD unless specified) Details  Once        Specialty:  Hospitalist  Provider:  (Not yet assigned)    10/08/24 1811                  Procedures     * Surgery not found *      Imaging Results (All)       Procedure Component Value Units Date/Time    CT Abdomen Pelvis Without Contrast [[id number]] Collected: 10/08/24 1648     Updated: 10/08/24 1657    Narrative:      CT ABDOMEN AND PELVIS WITHOUT IV CONTRAST     HISTORY: Abdominal pain, nausea and vomiting     TECHNIQUE: Radiation dose reduction techniques were utilized, including  automated exposure control and exposure modulation based on body size.  Axial images were obtained through the abdomen and pelvis without the  administration of IV contrast. Coronal and sagittal reformatted images  were obtained.     COMPARISON: 4/26/2024     FINDINGS:      ABDOMEN:  There is some mild atelectasis in the right lung base. There is diffuse  fatty infiltration of the liver. There is some layering high attenuation  material in the gallbladder which may reflect sludge and/or stones. The  spleen is normal in size. Bilateral renal cysts are identified. Some of  these are smaller. One of them is larger and increased in density  located in the right midpole region likely reflecting a hemorrhagic  cyst. It now measures about 3.7 cm, previously about 2.3 cm. There are  multiple nonobstructing bilateral kidney stones. The adrenal glands are  unremarkable. There is some inflammatory stranding around the head of  the pancreas suggesting pancreatitis. Suggest correlation with lipase  levels. There is no fluid collection or abscess.     PELVIS:  The bladder is unremarkable. There is a 3.3 cm right ovarian cyst. No  free fluid is seen in the pelvis. Appendix not visualized. Colon  unremarkable. No acute bony abnormality       Impression:      1. There is some inflammatory stranding around the head of the pancreas  suggesting acute pancreatitis.  "There is no fluid collection or abscess  2. There are bilateral nonobstructing kidney stones  3. There are bilateral renal cysts as described     Radiation dose reduction techniques were utilized, including automated  exposure control and exposure modulation based on body size.        This report was finalized on 10/8/2024 4:54 PM by Dr. Jermaine France M.D on Workstation: [id number]                 Pertinent Labs     Results from last 7 days   Lab Units 10/13/24  0413 10/12/24  0426 10/11/24  0517 10/10/24  0444   WBC 10*3/mm3 6.62 5.72 5.39 5.64   HEMOGLOBIN g/dL 11.3* 11.1* 11.4* 11.5*   PLATELETS 10*3/mm3 155 115* 102* 82*     Results from last 7 days   Lab Units 10/13/24  0413 10/12/24  0426 10/11/24  0517 10/10/24  0444   SODIUM mmol/L 137 137 137 135*   POTASSIUM mmol/L 3.8 3.4* 3.6 3.3*   CHLORIDE mmol/L 102 100 100 100   CO2 mmol/L 27.0 30.0* 24.7 24.2   BUN mg/dL 4* 4* 3* 6   CREATININE mg/dL 0.58 0.59 0.41* 0.54*   GLUCOSE mg/dL 93 98 94 87   Estimated Creatinine Clearance: 157 mL/min (by C-G formula based on SCr of 0.58 mg/dL).  Results from last 7 days   Lab Units 10/13/24  0413 10/12/24  0426 10/11/24  0517 10/10/24  0444   ALBUMIN g/dL 3.4* 3.3* 3.3* 3.3*   BILIRUBIN mg/dL 0.4 0.6 0.7 1.0   ALK PHOS U/L 78 79 78 80   AST (SGOT) U/L 24 38* 48* 50*   ALT (SGPT) U/L 25 30 28 27     Results from last 7 days   Lab Units 10/13/24  0413 10/12/24  0426 10/11/24  0517 10/10/24  0444   CALCIUM mg/dL 9.7 9.7 9.4 9.1   ALBUMIN g/dL 3.4* 3.3* 3.3* 3.3*   MAGNESIUM mg/dL 1.7 1.6 1.5* 1.5*   PHOSPHORUS mg/dL 3.6 3.8 3.4 2.6     Results from last 7 days   Lab Units 10/13/24  0413 10/12/24  0426 10/11/24  0517 10/10/24  0444   LIPASE U/L 1,054* 1,196* 1,200* 1,019*             Invalid input(s): \"LDLCALC\"  Results from last 7 days   Lab Units 10/08/24  1538   URINECX  >100,000 CFU/mL Escherichia coli*         Test Results Pending at Discharge       Discharge Details     Summary:        Discharge Medications    "     New Medications        Instructions Start Date   amLODIPine 2.5 MG tablet  Commonly known as: NORVASC   2.5 mg, Oral, Every 24 Hours Scheduled      cefdinir 300 MG capsule  Commonly known as: OMNICEF   300 mg, Oral, 2 Times Daily   Start Date: October 15, 2024              Allergies   Allergen Reactions    Azithromycin Hives    Iodinated Contrast Media Hives    Penicillins Hives       Discharge Disposition:  Home or Self Care      Discharge Diet:  Diet Order   Procedures    Diet: Gastrointestinal; Fat-Restricted; Fluid Consistency: Thin (IDDSI 0)       Discharge Activity:       CODE STATUS:    Code Status and Medical Interventions: CPR (Attempt to Resuscitate); Full Support   Ordered at: 10/08/24 1838     Code Status (Patient has no pulse and is not breathing):    CPR (Attempt to Resuscitate)     Medical Interventions (Patient has pulse or is breathing):    Full Support       No future appointments.   Follow-up Information       Provider, No Known. Schedule an appointment as soon as possible for a visit in 1 week(s).    Contact information:  [facility] [address] [phone]               Elizabeth Díaz MD. Schedule an appointment as soon as possible for a visit in 1 week(s).    Specialty: Urology  Contact information:  [facility]  [facility] [address] [phone]                             Time Spent on Discharge:  Greater than 30 minutes      Alicia Clark MD  [facility]  10/13/24  09:04 EDT

## 2024-10-13 NOTE — PLAN OF CARE
Goal Outcome Evaluation:           Progress: improving  Outcome Evaluation: Pt AOx4. On RA. NSR on monitor. Pt tolerating regular diet now. d/c orders in place. Needs meds to bed. Getting flu shot before leaving. IV abx before d/c as well. Father is ride home. Pt not in acute distress. Plan of care ongoing until d/c

## 2024-10-13 NOTE — PLAN OF CARE
Goal Outcome Evaluation:  Plan of Care Reviewed With: patient        Progress: improving  Outcome Evaluation: Patient resting  at this time.  Denies  any pain.   Remain  on  Full  liquid diet.  Continue to monitor labs.  Room  air.  SR on  the monitor.  Nursing  will continue to monitor.

## 2024-10-13 NOTE — OUTREACH NOTE
Prep Survey      Flowsheet Row Responses   [facility] [place] facility patient discharged from? [facility]   Is LACE score < 7 ? No   Eligibility Not Eligible   What are the reasons patient is not eligible? Other  [Cocaine use disorder]   Does the patient have one of the following disease processes/diagnoses(primary or secondary)? Other   Prep survey completed? Yes            Farida AL - Registered Nurse

## 2024-10-14 NOTE — CASE MANAGEMENT/SOCIAL WORK
Case Management Discharge Note      Final Note: Pt discharged home on 10/13.   MODESTO White RN         Selected Continued Care - Discharged on 10/13/2024 Admission date: 10/8/2024 - Discharge disposition: Home or Self Care      Destination    No services have been selected for the patient.                Durable Medical Equipment    No services have been selected for the patient.                Dialysis/Infusion    No services have been selected for the patient.                Home Medical Care    No services have been selected for the patient.                Therapy    No services have been selected for the patient.                Community Resources    No services have been selected for the patient.                Community & DME    No services have been selected for the patient.                    Transportation Services  Private: Car    Final Discharge Disposition Code: 01 - home or self-care

## 2024-11-14 ENCOUNTER — APPOINTMENT (OUTPATIENT)
Dept: CT IMAGING | Facility: HOSPITAL | Age: 29
End: 2024-11-14
Payer: MEDICAID

## 2024-11-14 ENCOUNTER — HOSPITAL ENCOUNTER (EMERGENCY)
Facility: HOSPITAL | Age: 29
Discharge: HOME | End: 2024-11-14
Attending: EMERGENCY MEDICINE | Admitting: EMERGENCY MEDICINE
Payer: MEDICAID

## 2024-11-14 VITALS
HEIGHT: 69 IN | OXYGEN SATURATION: 96 % | DIASTOLIC BLOOD PRESSURE: 96 MMHG | RESPIRATION RATE: 18 BRPM | BODY MASS INDEX: 22.22 KG/M2 | WEIGHT: 150 LBS | SYSTOLIC BLOOD PRESSURE: 130 MMHG | TEMPERATURE: 97.5 F | HEART RATE: 90 BPM

## 2024-11-14 DIAGNOSIS — K29.20 ACUTE ALCOHOLIC GASTRITIS WITHOUT HEMORRHAGE: ICD-10-CM

## 2024-11-14 DIAGNOSIS — K86.0 ALCOHOL-INDUCED CHRONIC PANCREATITIS: Primary | ICD-10-CM

## 2024-11-14 DIAGNOSIS — F10.920 ALCOHOLIC INTOXICATION WITHOUT COMPLICATION: ICD-10-CM

## 2024-11-14 LAB
ALBUMIN SERPL-MCNC: 4.6 G/DL (ref 3.5–5.2)
ALBUMIN/GLOB SERPL: 1.2 G/DL
ALP SERPL-CCNC: 117 U/L (ref 39–117)
ALT SERPL W P-5'-P-CCNC: 166 U/L (ref 1–33)
AMPHET+METHAMPHET UR QL: NEGATIVE
ANION GAP SERPL CALCULATED.3IONS-SCNC: 16.8 MMOL/L (ref 5–15)
AST SERPL-CCNC: 303 U/L (ref 1–32)
BACTERIA UR QL AUTO: ABNORMAL /HPF
BARBITURATES UR QL SCN: NEGATIVE
BASOPHILS # BLD AUTO: 0.11 10*3/MM3 (ref 0–0.2)
BASOPHILS NFR BLD AUTO: 1.8 % (ref 0–1.5)
BENZODIAZ UR QL SCN: NEGATIVE
BILIRUB SERPL-MCNC: 0.7 MG/DL (ref 0–1.2)
BILIRUB UR QL STRIP: NEGATIVE
BUN SERPL-MCNC: 6 MG/DL (ref 6–20)
BUN/CREAT SERPL: 8.2 (ref 7–25)
CALCIUM SPEC-SCNC: 9.5 MG/DL (ref 8.6–10.5)
CANNABINOIDS SERPL QL: POSITIVE
CHLORIDE SERPL-SCNC: 100 MMOL/L (ref 98–107)
CLARITY UR: CLEAR
CO2 SERPL-SCNC: 23.2 MMOL/L (ref 22–29)
COCAINE UR QL: POSITIVE
COLOR UR: ABNORMAL
CREAT SERPL-MCNC: 0.73 MG/DL (ref 0.57–1)
DEPRECATED RDW RBC AUTO: 50.7 FL (ref 37–54)
EGFRCR SERPLBLD CKD-EPI 2021: 113.6 ML/MIN/1.73
EOSINOPHIL # BLD AUTO: 0.09 10*3/MM3 (ref 0–0.4)
EOSINOPHIL NFR BLD AUTO: 1.5 % (ref 0.3–6.2)
ERYTHROCYTE [DISTWIDTH] IN BLOOD BY AUTOMATED COUNT: 14.4 % (ref 12.3–15.4)
ETHANOL BLD-MCNC: 341 MG/DL (ref 0–10)
ETHANOL UR QL: 0.34 %
FENTANYL UR-MCNC: NEGATIVE NG/ML
GLOBULIN UR ELPH-MCNC: 4 GM/DL
GLUCOSE SERPL-MCNC: 112 MG/DL (ref 65–99)
GLUCOSE UR STRIP-MCNC: NEGATIVE MG/DL
HCG SERPL QL: NEGATIVE
HCT VFR BLD AUTO: 41.9 % (ref 34–46.6)
HGB BLD-MCNC: 14.4 G/DL (ref 12–15.9)
HGB UR QL STRIP.AUTO: ABNORMAL
HOLD SPECIMEN: NORMAL
HYALINE CASTS UR QL AUTO: ABNORMAL /LPF
IMM GRANULOCYTES # BLD AUTO: 0.02 10*3/MM3 (ref 0–0.05)
IMM GRANULOCYTES NFR BLD AUTO: 0.3 % (ref 0–0.5)
KETONES UR QL STRIP: ABNORMAL
LEUKOCYTE ESTERASE UR QL STRIP.AUTO: ABNORMAL
LIPASE SERPL-CCNC: 180 U/L (ref 13–60)
LYMPHOCYTES # BLD AUTO: 2.66 10*3/MM3 (ref 0.7–3.1)
LYMPHOCYTES NFR BLD AUTO: 43.5 % (ref 19.6–45.3)
MCH RBC QN AUTO: 33.1 PG (ref 26.6–33)
MCHC RBC AUTO-ENTMCNC: 34.4 G/DL (ref 31.5–35.7)
MCV RBC AUTO: 96.3 FL (ref 79–97)
METHADONE UR QL SCN: NEGATIVE
MONOCYTES # BLD AUTO: 0.43 10*3/MM3 (ref 0.1–0.9)
MONOCYTES NFR BLD AUTO: 7 % (ref 5–12)
NEUTROPHILS NFR BLD AUTO: 2.8 10*3/MM3 (ref 1.7–7)
NEUTROPHILS NFR BLD AUTO: 45.9 % (ref 42.7–76)
NITRITE UR QL STRIP: NEGATIVE
NRBC BLD AUTO-RTO: 0 /100 WBC (ref 0–0.2)
OPIATES UR QL: NEGATIVE
OXYCODONE UR QL SCN: NEGATIVE
PH UR STRIP.AUTO: 6.5 [PH] (ref 5–8)
PLATELET # BLD AUTO: 173 10*3/MM3 (ref 140–450)
PMV BLD AUTO: 9.3 FL (ref 6–12)
POTASSIUM SERPL-SCNC: 3.6 MMOL/L (ref 3.5–5.2)
PROT SERPL-MCNC: 8.6 G/DL (ref 6–8.5)
PROT UR QL STRIP: ABNORMAL
RBC # BLD AUTO: 4.35 10*6/MM3 (ref 3.77–5.28)
RBC # UR STRIP: ABNORMAL /HPF
REF LAB TEST METHOD: ABNORMAL
SODIUM SERPL-SCNC: 140 MMOL/L (ref 136–145)
SP GR UR STRIP: 1.02 (ref 1–1.03)
SQUAMOUS #/AREA URNS HPF: ABNORMAL /HPF
UROBILINOGEN UR QL STRIP: ABNORMAL
WBC # UR STRIP: ABNORMAL /HPF
WBC NRBC COR # BLD AUTO: 6.11 10*3/MM3 (ref 3.4–10.8)
WHOLE BLOOD HOLD COAG: NORMAL
WHOLE BLOOD HOLD SPECIMEN: NORMAL

## 2024-11-14 PROCEDURE — 25010000002 THIAMINE HCL 200 MG/2ML SOLUTION: Performed by: PHYSICIAN ASSISTANT

## 2024-11-14 PROCEDURE — 80307 DRUG TEST PRSMV CHEM ANLYZR: CPT | Performed by: PHYSICIAN ASSISTANT

## 2024-11-14 PROCEDURE — 82077 ASSAY SPEC XCP UR&BREATH IA: CPT | Performed by: PHYSICIAN ASSISTANT

## 2024-11-14 PROCEDURE — 83690 ASSAY OF LIPASE: CPT | Performed by: EMERGENCY MEDICINE

## 2024-11-14 PROCEDURE — 25010000002 ONDANSETRON PER 1 MG: Performed by: PHYSICIAN ASSISTANT

## 2024-11-14 PROCEDURE — 74176 CT ABD & PELVIS W/O CONTRAST: CPT

## 2024-11-14 PROCEDURE — 96365 THER/PROPH/DIAG IV INF INIT: CPT

## 2024-11-14 PROCEDURE — 81001 URINALYSIS AUTO W/SCOPE: CPT | Performed by: EMERGENCY MEDICINE

## 2024-11-14 PROCEDURE — 96375 TX/PRO/DX INJ NEW DRUG ADDON: CPT

## 2024-11-14 PROCEDURE — 84703 CHORIONIC GONADOTROPIN ASSAY: CPT | Performed by: EMERGENCY MEDICINE

## 2024-11-14 PROCEDURE — 99284 EMERGENCY DEPT VISIT MOD MDM: CPT

## 2024-11-14 PROCEDURE — 80053 COMPREHEN METABOLIC PANEL: CPT | Performed by: EMERGENCY MEDICINE

## 2024-11-14 PROCEDURE — 36415 COLL VENOUS BLD VENIPUNCTURE: CPT

## 2024-11-14 PROCEDURE — 25810000003 SODIUM CHLORIDE 0.9 % SOLUTION: Performed by: PHYSICIAN ASSISTANT

## 2024-11-14 PROCEDURE — 85025 COMPLETE CBC W/AUTO DIFF WBC: CPT | Performed by: EMERGENCY MEDICINE

## 2024-11-14 PROCEDURE — 25010000002 MORPHINE PER 10 MG: Performed by: EMERGENCY MEDICINE

## 2024-11-14 RX ORDER — AMLODIPINE BESYLATE 2.5 MG/1
2.5 TABLET ORAL DAILY
COMMUNITY

## 2024-11-14 RX ORDER — LIDOCAINE HYDROCHLORIDE 20 MG/ML
5 SOLUTION OROPHARYNGEAL ONCE
Status: COMPLETED | OUTPATIENT
Start: 2024-11-14 | End: 2024-11-14

## 2024-11-14 RX ORDER — SODIUM CHLORIDE 0.9 % (FLUSH) 0.9 %
10 SYRINGE (ML) INJECTION AS NEEDED
Status: DISCONTINUED | OUTPATIENT
Start: 2024-11-14 | End: 2024-11-14 | Stop reason: HOSPADM

## 2024-11-14 RX ORDER — ONDANSETRON 2 MG/ML
4 INJECTION INTRAMUSCULAR; INTRAVENOUS ONCE
Status: COMPLETED | OUTPATIENT
Start: 2024-11-14 | End: 2024-11-14

## 2024-11-14 RX ORDER — FAMOTIDINE 10 MG/ML
20 INJECTION, SOLUTION INTRAVENOUS ONCE
Status: COMPLETED | OUTPATIENT
Start: 2024-11-14 | End: 2024-11-14

## 2024-11-14 RX ORDER — MORPHINE SULFATE 2 MG/ML
2 INJECTION, SOLUTION INTRAMUSCULAR; INTRAVENOUS ONCE
Status: COMPLETED | OUTPATIENT
Start: 2024-11-14 | End: 2024-11-14

## 2024-11-14 RX ORDER — ALUMINA, MAGNESIA, AND SIMETHICONE 2400; 2400; 240 MG/30ML; MG/30ML; MG/30ML
15 SUSPENSION ORAL ONCE
Status: COMPLETED | OUTPATIENT
Start: 2024-11-14 | End: 2024-11-14

## 2024-11-14 RX ORDER — SODIUM CHLORIDE 9 MG/ML
1000 INJECTION, SOLUTION INTRAVENOUS ONCE
Status: COMPLETED | OUTPATIENT
Start: 2024-11-14 | End: 2024-11-14

## 2024-11-14 RX ORDER — THIAMINE HYDROCHLORIDE 100 MG/ML
200 INJECTION, SOLUTION INTRAMUSCULAR; INTRAVENOUS ONCE
Status: COMPLETED | OUTPATIENT
Start: 2024-11-14 | End: 2024-11-14

## 2024-11-14 RX ORDER — SUCRALFATE ORAL 1 G/10ML
1 SUSPENSION ORAL
Qty: 120 EACH | Refills: 0 | Status: SHIPPED | OUTPATIENT
Start: 2024-11-14

## 2024-11-14 RX ORDER — PANTOPRAZOLE SODIUM 40 MG/1
40 TABLET, DELAYED RELEASE ORAL DAILY
Qty: 60 TABLET | Refills: 0 | Status: SHIPPED | OUTPATIENT
Start: 2024-11-14

## 2024-11-14 RX ADMIN — SODIUM CHLORIDE 1000 ML: 9 INJECTION, SOLUTION INTRAVENOUS at 13:04

## 2024-11-14 RX ADMIN — LIDOCAINE HYDROCHLORIDE 5 ML: 20 SOLUTION ORAL at 15:28

## 2024-11-14 RX ADMIN — FAMOTIDINE 20 MG: 10 INJECTION INTRAVENOUS at 13:11

## 2024-11-14 RX ADMIN — MORPHINE SULFATE 2 MG: 2 INJECTION, SOLUTION INTRAMUSCULAR; INTRAVENOUS at 13:11

## 2024-11-14 RX ADMIN — THIAMINE HYDROCHLORIDE 200 MG: 100 INJECTION, SOLUTION INTRAMUSCULAR; INTRAVENOUS at 13:08

## 2024-11-14 RX ADMIN — ONDANSETRON 4 MG: 2 INJECTION, SOLUTION INTRAMUSCULAR; INTRAVENOUS at 13:06

## 2024-11-14 RX ADMIN — ALUMINUM HYDROXIDE, MAGNESIUM HYDROXIDE, DIMETHICONE 15 ML: 400; 400; 40 SUSPENSION ORAL at 15:28

## 2024-11-14 RX ADMIN — FOLIC ACID 1 MG: 5 INJECTION, SOLUTION INTRAMUSCULAR; INTRAVENOUS; SUBCUTANEOUS at 14:38

## 2024-11-14 NOTE — ED PROVIDER NOTES
MD ATTESTATION NOTE     SHARED VISIT: This visit was performed by BOTH a physician and an APC. The substantive portion of the medical decision making was performed by this attesting physician who made or approved the management plan and takes responsibility for patient management. All studies in the APC note (if performed) were independently interpreted by me.  The RENÉ and I have discussed this patient's history, physical exam, and treatment plan. I have reviewed the documentation and personally had a face to face interaction with the patient. I affirm the documentation and agree with the treatment and plan. I provided a substantive portion of the care of the patient.  I personally performed the physical exam in its entirety, and below are my findings.      Brief HPI: Patient complains of acute abdominal pain and nausea.  She was admitted here last month for alcohol induced pancreatitis.  She states that following this admission she stopped drinking for about 3 weeks but recently began drinking heavily again.  Denies fever, chills, vomiting, chest pain, shortness of breath, or diarrhea.  Patient wants to go to the healing place but was told that she needed medical clearance.    PHYSICAL EXAM    GENERAL: Awake, alert, oriented x 3.  Nontoxic-appearing female.  She smells of alcohol   HENT: nares patent  EYES: no scleral icterus  CV: regular rhythm, normal rate  RESPIRATORY: normal effort, CTAB  ABDOMEN: soft, nondistended, there is epigastric tenderness without rebound or guarding, no CVA tenderness  MUSCULOSKELETAL: no deformity  NEURO: alert, moves all extremities, follows commands  PSYCH:  calm, cooperative  SKIN: warm, dry    Vital signs and nursing notes reviewed.        Plan: Patient complains of nausea and acute abdominal pain.  She has a history of alcohol abuse and pancreatitis.  She is afebrile.  She does not have acute abdomen.  Lipase is 180.  ALT and AST are elevated.  Alkaline phosphatase, bilirubin,  and white blood cell count are normal.  EtOH is 341.  She has been given Pepcid, morphine, thiamine, Zofran and IV fluids.  CT ab/pelvis is pending.      CT abdomen/pelvis personally interpreted by me.  My personal interpretation is: No bowel obstruction.  There are bilateral renal stones.  No obstructive uropathy    ED Course as of 11/14/24 1751   Thu Nov 14, 2024   1249 WBC: 6.11 [MP]   1249 Hemoglobin: 14.4 [MP]   1249 HCG Qualitative: Negative [MP]   1255 Lipase(!): 180 [MP]   1255 BUN: 6 [MP]   1255 Creatinine: 0.73 [MP]   1255 ALT (SGPT)(!): 166 [MP]   1255 AST (SGOT)(!): 303 [MP]   1255 Alkaline Phosphatase: 117 [MP]   1255 Total Bilirubin: 0.7 [MP]   1312 Ethanol(!): 341 [MP]   1457 Cocaine Screen, Urine(!): Positive [WH]   1457 THC Screen, Urine(!): Positive [WH]   1529 Reassessed patient.  She is receiving GI cocktail and will be ready for discharge.  She will call her dad to have him come pick her up.  Discussed that she can go to the healing place.  Will give her a GI information to follow-up with.  Will start her on Protonix and sucralfate.  Discussed ED return precautions.  She is otherwise well-appearing, hemodynamically stable, and therefore appropriate for discharge. [MP]      ED Course User Index  [MP] Lori Francis PA-C  [WH] Zeeshan Ortiz MD Holland, William D, MD  11/14/24 1751

## 2024-11-14 NOTE — ED PROVIDER NOTES
EMERGENCY DEPARTMENT ENCOUNTER  Room Number:  42/42  PCP: Provider, No Known  Independent Historians: Patient      HPI:  Chief Complaint: had concerns including Abdominal Pain.     A complete HPI/ROS/PMH/PSH/SH/FH are unobtainable due to: None    Chronic or social conditions impacting patient care (Social Determinants of Health): None      Context: Adriel Armenta is a 29 y.o. female with a medical history of polysubstance use who presents to the ED c/o acute abdominal pain.  Patient reports she has had chronic abdominal pain secondary to pancreatitis.  This has been ongoing for several months.  This is not worsened but has also not improved.  Reports she was hospitalized 1 month ago for pancreatitis.  States she has been drinking alcohol heavily.  Last drink at 0900 this morning.  Her father picked her up this morning and was planning to take her to the healing place.  However, they required medical clearance prior to accepting the patient.  Patient states she has been unable to eat or drink and has lost weight because this causes abdominal pain.  She has had nausea but no vomiting.  No reported fever.  Denies dysuria.  No other systemic complaints at this time.  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      Review of prior external notes (non-ED) -and- Review of prior external test results outside of this encounter:  Patient seen at urgent care on 11/20/2023 for fever and lower respiratory infection.  Reviewed assessment and plan.  Patient prescribed doxycycline.  Reviewed labs collected on 10/13/2024.  CBC with hemoglobin 11.3, CMP with creatinine 0.58.    Prescription drug monitoring program review:     N/A    PAST MEDICAL HISTORY  Active Ambulatory Problems     Diagnosis Date Noted    Alcohol abuse 08/15/2019    Depression 08/15/2019    Acute alcoholic pancreatitis 10/08/2024    Alcohol use disorder 10/08/2024    Cocaine use disorder 10/08/2024    Hepatic steatosis 10/08/2024     Hyponatremia 10/08/2024     Resolved Ambulatory Problems     Diagnosis Date Noted    No Resolved Ambulatory Problems     Past Medical History:   Diagnosis Date    ASCUS of cervix with negative high risk HPV 09/05/2014    Hydrocalycosis     Hypertension     Kidney stone     LGSIL on Pap smear of cervix 03/12/2014    Pancreatitis          PAST SURGICAL HISTORY  Past Surgical History:   Procedure Laterality Date    APPENDECTOMY           FAMILY HISTORY  Family History   Problem Relation Age of Onset    No Known Problems Mother     Diabetes Father     Hyperlipidemia Father     No Known Problems Brother          SOCIAL HISTORY  Social History     Socioeconomic History    Marital status: Single   Tobacco Use    Smoking status: Former     Current packs/day: 5.00     Average packs/day: 5.0 packs/day for 3.0 years (15.0 ttl pk-yrs)     Types: Cigarettes    Smokeless tobacco: Never   Vaping Use    Vaping status: Every Day    Substances: Nicotine, Flavoring    Devices: Disposable   Substance and Sexual Activity    Alcohol use: Yes     Comment: approx fifth of vodka daily    Drug use: Yes     Types: Cocaine(coke)    Sexual activity: Yes     Partners: Male     Birth control/protection: OCP         ALLERGIES  Azithromycin, Iodinated contrast media, and Penicillins      REVIEW OF SYSTEMS  Included in HPI  All systems reviewed and negative except for those discussed in HPI.      PHYSICAL EXAM    I have reviewed the triage vital signs and nursing notes.    ED Triage Vitals   Temp Heart Rate Resp BP SpO2   11/14/24 1206 11/14/24 1206 11/14/24 1206 11/14/24 1209 11/14/24 1206   97.5 °F (36.4 °C) (!) 121 16 (!) 143/113 97 %      Temp src Heart Rate Source Patient Position BP Location FiO2 (%)   11/14/24 1206 11/14/24 1206 11/14/24 1209 11/14/24 1209 --   Tympanic Monitor Sitting Right arm        Physical Exam  Constitutional:       General: She is not in acute distress.     Appearance: She is well-developed.   HENT:      Head:  Normocephalic and atraumatic.   Eyes:      Extraocular Movements: Extraocular movements intact.   Cardiovascular:      Rate and Rhythm: Regular rhythm. Tachycardia present.      Heart sounds: Normal heart sounds.      Comments: Distal pulses intact  Pulmonary:      Effort: Pulmonary effort is normal.      Breath sounds: Normal breath sounds.   Abdominal:      General: There is no distension.      Tenderness:  in the epigastric area   Skin:     General: Skin is warm.   Neurological:      General: No focal deficit present.      Mental Status: She is alert and oriented to person, place, and time.   Psychiatric:         Mood and Affect: Mood normal.             LAB RESULTS  Recent Results (from the past 24 hours)   Comprehensive Metabolic Panel    Collection Time: 11/14/24 12:19 PM    Specimen: Blood   Result Value Ref Range    Glucose 112 (H) 65 - 99 mg/dL    BUN 6 6 - 20 mg/dL    Creatinine 0.73 0.57 - 1.00 mg/dL    Sodium 140 136 - 145 mmol/L    Potassium 3.6 3.5 - 5.2 mmol/L    Chloride 100 98 - 107 mmol/L    CO2 23.2 22.0 - 29.0 mmol/L    Calcium 9.5 8.6 - 10.5 mg/dL    Total Protein 8.6 (H) 6.0 - 8.5 g/dL    Albumin 4.6 3.5 - 5.2 g/dL    ALT (SGPT) 166 (H) 1 - 33 U/L    AST (SGOT) 303 (H) 1 - 32 U/L    Alkaline Phosphatase 117 39 - 117 U/L    Total Bilirubin 0.7 0.0 - 1.2 mg/dL    Globulin 4.0 gm/dL    A/G Ratio 1.2 g/dL    BUN/Creatinine Ratio 8.2 7.0 - 25.0    Anion Gap 16.8 (H) 5.0 - 15.0 mmol/L    eGFR 113.6 >60.0 mL/min/1.73   Lipase    Collection Time: 11/14/24 12:19 PM    Specimen: Blood   Result Value Ref Range    Lipase 180 (H) 13 - 60 U/L   hCG, Serum, Qualitative    Collection Time: 11/14/24 12:19 PM    Specimen: Blood   Result Value Ref Range    HCG Qualitative Negative Negative   Green Top (Gel)    Collection Time: 11/14/24 12:19 PM   Result Value Ref Range    Extra Tube Hold for add-ons.    Lavender Top    Collection Time: 11/14/24 12:19 PM   Result Value Ref Range    Extra Tube hold for add-on     Light Blue Top    Collection Time: 11/14/24 12:19 PM   Result Value Ref Range    Extra Tube Hold for add-ons.    CBC Auto Differential    Collection Time: 11/14/24 12:19 PM    Specimen: Blood   Result Value Ref Range    WBC 6.11 3.40 - 10.80 10*3/mm3    RBC 4.35 3.77 - 5.28 10*6/mm3    Hemoglobin 14.4 12.0 - 15.9 g/dL    Hematocrit 41.9 34.0 - 46.6 %    MCV 96.3 79.0 - 97.0 fL    MCH 33.1 (H) 26.6 - 33.0 pg    MCHC 34.4 31.5 - 35.7 g/dL    RDW 14.4 12.3 - 15.4 %    RDW-SD 50.7 37.0 - 54.0 fl    MPV 9.3 6.0 - 12.0 fL    Platelets 173 140 - 450 10*3/mm3    Neutrophil % 45.9 42.7 - 76.0 %    Lymphocyte % 43.5 19.6 - 45.3 %    Monocyte % 7.0 5.0 - 12.0 %    Eosinophil % 1.5 0.3 - 6.2 %    Basophil % 1.8 (H) 0.0 - 1.5 %    Immature Grans % 0.3 0.0 - 0.5 %    Neutrophils, Absolute 2.80 1.70 - 7.00 10*3/mm3    Lymphocytes, Absolute 2.66 0.70 - 3.10 10*3/mm3    Monocytes, Absolute 0.43 0.10 - 0.90 10*3/mm3    Eosinophils, Absolute 0.09 0.00 - 0.40 10*3/mm3    Basophils, Absolute 0.11 0.00 - 0.20 10*3/mm3    Immature Grans, Absolute 0.02 0.00 - 0.05 10*3/mm3    nRBC 0.0 0.0 - 0.2 /100 WBC   Ethanol    Collection Time: 11/14/24 12:19 PM    Specimen: Blood   Result Value Ref Range    Ethanol 341 (H) 0 - 10 mg/dL    Ethanol % 0.341 %   Urinalysis With Microscopic If Indicated (No Culture) - Urine, Clean Catch    Collection Time: 11/14/24 12:38 PM    Specimen: Urine, Clean Catch   Result Value Ref Range    Color, UA Dark Yellow (A) Yellow, Straw    Appearance, UA Clear Clear    pH, UA 6.5 5.0 - 8.0    Specific Gravity, UA 1.016 1.005 - 1.030    Glucose, UA Negative Negative    Ketones, UA Trace (A) Negative    Bilirubin, UA Negative Negative    Blood, UA Trace (A) Negative    Protein,  mg/dL (2+) (A) Negative    Leuk Esterase, UA Trace (A) Negative    Nitrite, UA Negative Negative    Urobilinogen, UA 1.0 E.U./dL 0.2 - 1.0 E.U./dL   Urine Drug Screen - Urine, Clean Catch    Collection Time: 11/14/24 12:38 PM    Specimen:  Urine, Clean Catch   Result Value Ref Range    Amphet/Methamphet, Screen Negative Negative    Barbiturates Screen, Urine Negative Negative    Benzodiazepine Screen, Urine Negative Negative    Cocaine Screen, Urine Positive (A) Negative    Opiate Screen Negative Negative    THC, Screen, Urine Positive (A) Negative    Methadone Screen, Urine Negative Negative    Oxycodone Screen, Urine Negative Negative    Fentanyl, Urine Negative Negative   Urinalysis, Microscopic Only - Urine, Clean Catch    Collection Time: 11/14/24 12:38 PM    Specimen: Urine, Clean Catch   Result Value Ref Range    RBC, UA 0-2 None Seen, 0-2 /HPF    WBC, UA 6-10 (A) None Seen, 0-2 /HPF    Bacteria, UA None Seen None Seen /HPF    Squamous Epithelial Cells, UA 7-12 (A) None Seen, 0-2 /HPF    Hyaline Casts, UA 0-2 None Seen /LPF    Methodology Automated Microscopy          RADIOLOGY  CT Abdomen Pelvis Without Contrast    Result Date: 11/14/2024  CT OF THE ABDOMEN AND PELVIS WITHOUT CONTRAST 11/14/2024  HISTORY: Epigastric pain. History of pancreatitis.  Spiral images were obtained from the lung bases to the symphysis pubis. No intravenous or oral contrast was given.  There is fatty infiltration of the liver. The gallbladder, spleen and pancreas appear unremarkable. No inflammatory changes are seen in the region of the pancreas.  Adrenal glands are not enlarged. There are bilateral low-density renal lesions consistent with renal cyst. At least one tiny stone, nonobstructing is seen in each kidney.  No bowel wall thickening or bowel dilatation is seen. Uterus and urinary bladder appear unremarkable. No abnormal fluid collections are seen.      1. Fatty infiltration of the liver. 2. No evidence of acute pancreatitis. 3. Bilateral renal cysts and nonobstructing bilateral renal stones.  Radiation dose reduction techniques were utilized, including automated exposure control and exposure modulation based on body size.           MEDICATIONS GIVEN IN  ER  Medications   sodium chloride 0.9 % infusion 1,000 mL (0 mL Intravenous Stopped 11/14/24 1529)   folic acid 1 mg in sodium chloride 0.9 % 50 mL IVPB (0 mg Intravenous Stopped 11/14/24 1529)   thiamine (B-1) injection 200 mg (200 mg Intravenous Given 11/14/24 1308)   ondansetron (ZOFRAN) injection 4 mg (4 mg Intravenous Given 11/14/24 1306)   morphine injection 2 mg (2 mg Intravenous Given 11/14/24 1311)   famotidine (PEPCID) injection 20 mg (20 mg Intravenous Given 11/14/24 1311)   aluminum-magnesium hydroxide-simethicone (MAALOX MAX) 400-400-40 MG/5ML suspension 15 mL (15 mL Oral Given 11/14/24 1528)   Lidocaine Viscous HCl (XYLOCAINE) 2 % solution 5 mL (5 mL Mouth/Throat Given 11/14/24 1528)         ORDERS PLACED DURING THIS VISIT:  Orders Placed This Encounter   Procedures    CT Abdomen Pelvis Without Contrast    [place] Draw    Comprehensive Metabolic Panel    Lipase    Urinalysis With Microscopic If Indicated (No Culture) - Urine, Clean Catch    hCG, Serum, Qualitative    CBC Auto Differential    Ethanol    Urine Drug Screen - Urine, Clean Catch    Urinalysis, Microscopic Only - Urine, Clean Catch    Undress & Gown    CBC & Differential    Green Top (Gel)    Lavender Top    Light Blue Top         OUTPATIENT MEDICATION MANAGEMENT:  No current Epic-ordered facility-administered medications on file.     Current Outpatient Medications Ordered in Epic   Medication Sig Dispense Refill    amLODIPine (NORVASC) 2.5 MG tablet Take 1 tablet by mouth Daily.      pantoprazole (PROTONIX) 40 MG EC tablet Take 1 tablet by mouth Daily. 60 tablet 0    sucralfate (CARAFATE) 1 GM/10ML suspension Take 10 mL by mouth 4 (Four) Times a Day With Meals & at Bedtime. 120 each 0           PROGRESS, DATA ANALYSIS, CONSULTS, AND MEDICAL DECISION MAKING  All labs have been independently interpreted by me.  All radiology studies have been reviewed by me. All EKG's have been independently viewed and interpreted by me.  Discussion below  represents my analysis of pertinent findings related to patient's condition, differential diagnosis, treatment plan and final disposition.    Differential diagnosis includes but is not limited to pancreatitis, gastritis, alcohol intoxication.        ED Course as of 11/14/24 1903   Thu Nov 14, 2024   1249 WBC: 6.11 [MP]   1249 Hemoglobin: 14.4 [MP]   1249 HCG Qualitative: Negative [MP]   1255 Lipase(!): 180 [MP]   1255 BUN: 6 [MP]   1255 Creatinine: 0.73 [MP]   1255 ALT (SGPT)(!): 166 [MP]   1255 AST (SGOT)(!): 303 [MP]   1255 Alkaline Phosphatase: 117 [MP]   1255 Total Bilirubin: 0.7 [MP]   1312 Ethanol(!): 341 [MP]   1457 Cocaine Screen, Urine(!): Positive [WH]   1457 THC Screen, Urine(!): Positive [WH]   1529 Reassessed patient.  She is receiving GI cocktail and will be ready for discharge.  She will call her dad to have him come pick her up.  Discussed that she can go to the healing place.  Will give her a GI information to follow-up with.  Will start her on Protonix and sucralfate.  Discussed ED return precautions.  She is otherwise well-appearing, hemodynamically stable, and therefore appropriate for discharge. [MP]      ED Course User Index  [MP] Lori Francis PA-C  [WH] Zeeshan Ortiz MD             AS OF 19:03 EST VITALS:    BP - 130/96  HR - 90  TEMP - 97.5 °F (36.4 °C) (Tympanic)  O2 SATS - 96%    COMPLEXITY OF CARE  Admission was considered but after careful review of the patient's presentation, physical examination, diagnostic results, and response to treatment the patient may be safely discharged with outpatient follow-up.      DIAGNOSIS  Final diagnoses:   Alcohol-induced chronic pancreatitis   Acute alcoholic gastritis without hemorrhage   Alcoholic intoxication without complication         DISPOSITION  ED Disposition       ED Disposition   Discharge    Condition   Stable    Comment   --                Please note that portions of this document were completed with a voice recognition  program.    Note Disclaimer: At [facility], we believe that sharing information builds trust and better relationships. You are receiving this note because you recently visited [facility]. It is possible you will see health information before a provider has talked with you about it. This kind of information can be easy to misunderstand. To help you fully understand what it means for your health, we urge you to discuss this note with your provider.     Lori Francis PAVeeC  11/14/24 1904

## 2024-12-09 ENCOUNTER — HOSPITAL ENCOUNTER (OUTPATIENT)
Facility: HOSPITAL | Age: 29
Setting detail: OBSERVATION
Discharge: HOME | End: 2024-12-12
Attending: EMERGENCY MEDICINE | Admitting: HOSPITALIST
Payer: MEDICAID

## 2024-12-09 DIAGNOSIS — I10 UNCONTROLLED HYPERTENSION: ICD-10-CM

## 2024-12-09 DIAGNOSIS — F19.10 POLYSUBSTANCE ABUSE: ICD-10-CM

## 2024-12-09 DIAGNOSIS — K86.0 ALCOHOL-INDUCED CHRONIC PANCREATITIS: ICD-10-CM

## 2024-12-09 DIAGNOSIS — F10.929 ALCOHOLIC INTOXICATION WITH COMPLICATION: ICD-10-CM

## 2024-12-09 DIAGNOSIS — R11.2 NAUSEA AND VOMITING, UNSPECIFIED VOMITING TYPE: Primary | ICD-10-CM

## 2024-12-09 DIAGNOSIS — F10.10 ALCOHOL ABUSE: ICD-10-CM

## 2024-12-09 PROCEDURE — 80143 DRUG ASSAY ACETAMINOPHEN: CPT | Performed by: EMERGENCY MEDICINE

## 2024-12-09 PROCEDURE — 85025 COMPLETE CBC W/AUTO DIFF WBC: CPT | Performed by: EMERGENCY MEDICINE

## 2024-12-09 PROCEDURE — 93005 ELECTROCARDIOGRAM TRACING: CPT | Performed by: EMERGENCY MEDICINE

## 2024-12-09 PROCEDURE — 82077 ASSAY SPEC XCP UR&BREATH IA: CPT | Performed by: EMERGENCY MEDICINE

## 2024-12-09 PROCEDURE — 93010 ELECTROCARDIOGRAM REPORT: CPT | Performed by: INTERNAL MEDICINE

## 2024-12-09 PROCEDURE — 84703 CHORIONIC GONADOTROPIN ASSAY: CPT | Performed by: EMERGENCY MEDICINE

## 2024-12-09 PROCEDURE — 99285 EMERGENCY DEPT VISIT HI MDM: CPT

## 2024-12-09 PROCEDURE — 80179 DRUG ASSAY SALICYLATE: CPT | Performed by: EMERGENCY MEDICINE

## 2024-12-09 RX ORDER — THIAMINE HYDROCHLORIDE 100 MG/ML
100 INJECTION, SOLUTION INTRAMUSCULAR; INTRAVENOUS ONCE
Status: COMPLETED | OUTPATIENT
Start: 2024-12-09 | End: 2024-12-10

## 2024-12-09 RX ORDER — LORAZEPAM 2 MG/ML
1 INJECTION INTRAMUSCULAR ONCE
Status: COMPLETED | OUTPATIENT
Start: 2024-12-09 | End: 2024-12-10

## 2024-12-09 RX ORDER — SODIUM CHLORIDE 0.9 % (FLUSH) 0.9 %
10 SYRINGE (ML) INJECTION AS NEEDED
Status: DISCONTINUED | OUTPATIENT
Start: 2024-12-09 | End: 2024-12-12 | Stop reason: HOSPADM

## 2024-12-09 NOTE — LETTER
[facility]  [phone]    ACCESS CENTER STATEMENT OF DISPOSITION        I, Adriel Armenta, was assessed in the [facility] at [facility] on 12/11/2024.  I understand the recommendations below and what follow-up action is expected of me.      Outpatient Counseling Resources:    [facility] - [phone] or [email or web address] - [phone] or [email or web address]        ________________________________  Clinician Signature    12/11/2024  15:59 EST

## 2024-12-10 ENCOUNTER — APPOINTMENT (OUTPATIENT)
Dept: GENERAL RADIOLOGY | Facility: HOSPITAL | Age: 29
End: 2024-12-10
Payer: MEDICAID

## 2024-12-10 PROBLEM — F19.10 POLYSUBSTANCE ABUSE: Status: ACTIVE | Noted: 2024-12-10

## 2024-12-10 PROBLEM — R74.01 TRANSAMINITIS: Status: ACTIVE | Noted: 2024-12-10

## 2024-12-10 LAB
ALBUMIN SERPL-MCNC: 3.9 G/DL (ref 3.5–5.2)
ALBUMIN/GLOB SERPL: 0.9 G/DL
ALP SERPL-CCNC: 82 U/L (ref 39–117)
ALT SERPL W P-5'-P-CCNC: 47 U/L (ref 1–33)
AMPHET+METHAMPHET UR QL: NEGATIVE
ANION GAP SERPL CALCULATED.3IONS-SCNC: 14.6 MMOL/L (ref 5–15)
ANION GAP SERPL CALCULATED.3IONS-SCNC: 15.9 MMOL/L (ref 5–15)
APAP SERPL-MCNC: <5 MCG/ML (ref 0–30)
AST SERPL-CCNC: 85 U/L (ref 1–32)
BACTERIA UR QL AUTO: ABNORMAL /HPF
BARBITURATES UR QL SCN: NEGATIVE
BASOPHILS # BLD AUTO: 0.12 10*3/MM3 (ref 0–0.2)
BASOPHILS NFR BLD AUTO: 1.1 % (ref 0–1.5)
BENZODIAZ UR QL SCN: NEGATIVE
BILIRUB SERPL-MCNC: 0.5 MG/DL (ref 0–1.2)
BILIRUB UR QL STRIP: NEGATIVE
BUN SERPL-MCNC: 6 MG/DL (ref 6–20)
BUN SERPL-MCNC: 7 MG/DL (ref 6–20)
BUN/CREAT SERPL: 10.2 (ref 7–25)
BUN/CREAT SERPL: 8.1 (ref 7–25)
CALCIUM SPEC-SCNC: 8.8 MG/DL (ref 8.6–10.5)
CALCIUM SPEC-SCNC: 9.2 MG/DL (ref 8.6–10.5)
CANNABINOIDS SERPL QL: POSITIVE
CHLORIDE SERPL-SCNC: 102 MMOL/L (ref 98–107)
CHLORIDE SERPL-SCNC: 103 MMOL/L (ref 98–107)
CLARITY UR: CLEAR
CO2 SERPL-SCNC: 23.1 MMOL/L (ref 22–29)
CO2 SERPL-SCNC: 24.4 MMOL/L (ref 22–29)
COCAINE UR QL: POSITIVE
COLOR UR: YELLOW
CREAT SERPL-MCNC: 0.59 MG/DL (ref 0.57–1)
CREAT SERPL-MCNC: 0.86 MG/DL (ref 0.57–1)
DEPRECATED RDW RBC AUTO: 44.5 FL (ref 37–54)
DEPRECATED RDW RBC AUTO: 46.2 FL (ref 37–54)
EGFRCR SERPLBLD CKD-EPI 2021: 124.5 ML/MIN/1.73
EGFRCR SERPLBLD CKD-EPI 2021: 93.3 ML/MIN/1.73
EOSINOPHIL # BLD AUTO: 0.03 10*3/MM3 (ref 0–0.4)
EOSINOPHIL NFR BLD AUTO: 0.3 % (ref 0.3–6.2)
ERYTHROCYTE [DISTWIDTH] IN BLOOD BY AUTOMATED COUNT: 13.2 % (ref 12.3–15.4)
ERYTHROCYTE [DISTWIDTH] IN BLOOD BY AUTOMATED COUNT: 13.2 % (ref 12.3–15.4)
ETHANOL BLD-MCNC: 351 MG/DL (ref 0–10)
ETHANOL UR QL: 0.35 %
FENTANYL UR-MCNC: NEGATIVE NG/ML
GLOBULIN UR ELPH-MCNC: 4.4 GM/DL
GLUCOSE SERPL-MCNC: 78 MG/DL (ref 65–99)
GLUCOSE SERPL-MCNC: 87 MG/DL (ref 65–99)
GLUCOSE UR STRIP-MCNC: NEGATIVE MG/DL
HAV IGM SERPL QL IA: NORMAL
HBV CORE IGM SERPL QL IA: NORMAL
HBV SURFACE AG SERPL QL IA: NORMAL
HCG SERPL QL: NEGATIVE
HCT VFR BLD AUTO: 38.4 % (ref 34–46.6)
HCT VFR BLD AUTO: 48.2 % (ref 34–46.6)
HCV AB SER QL: NORMAL
HGB BLD-MCNC: 13.8 G/DL (ref 12–15.9)
HGB BLD-MCNC: 16.8 G/DL (ref 12–15.9)
HGB UR QL STRIP.AUTO: ABNORMAL
HYALINE CASTS UR QL AUTO: ABNORMAL /LPF
IMM GRANULOCYTES # BLD AUTO: 0.02 10*3/MM3 (ref 0–0.05)
IMM GRANULOCYTES NFR BLD AUTO: 0.2 % (ref 0–0.5)
KETONES UR QL STRIP: ABNORMAL
LEUKOCYTE ESTERASE UR QL STRIP.AUTO: ABNORMAL
LIPASE SERPL-CCNC: 87 U/L (ref 13–60)
LYMPHOCYTES # BLD AUTO: 3.99 10*3/MM3 (ref 0.7–3.1)
LYMPHOCYTES NFR BLD AUTO: 35.9 % (ref 19.6–45.3)
MAGNESIUM SERPL-MCNC: 1.9 MG/DL (ref 1.6–2.6)
MCH RBC QN AUTO: 32.6 PG (ref 26.6–33)
MCH RBC QN AUTO: 34.1 PG (ref 26.6–33)
MCHC RBC AUTO-ENTMCNC: 34.9 G/DL (ref 31.5–35.7)
MCHC RBC AUTO-ENTMCNC: 35.9 G/DL (ref 31.5–35.7)
MCV RBC AUTO: 93.6 FL (ref 79–97)
MCV RBC AUTO: 94.8 FL (ref 79–97)
METHADONE UR QL SCN: NEGATIVE
MONOCYTES # BLD AUTO: 0.45 10*3/MM3 (ref 0.1–0.9)
MONOCYTES NFR BLD AUTO: 4 % (ref 5–12)
NEUTROPHILS NFR BLD AUTO: 58.5 % (ref 42.7–76)
NEUTROPHILS NFR BLD AUTO: 6.51 10*3/MM3 (ref 1.7–7)
NITRITE UR QL STRIP: NEGATIVE
NRBC BLD AUTO-RTO: 0 /100 WBC (ref 0–0.2)
OPIATES UR QL: NEGATIVE
OXYCODONE UR QL SCN: NEGATIVE
PH UR STRIP.AUTO: 6 [PH] (ref 5–8)
PHOSPHATE SERPL-MCNC: 2.8 MG/DL (ref 2.5–4.5)
PLATELET # BLD AUTO: 219 10*3/MM3 (ref 140–450)
PLATELET # BLD AUTO: 301 10*3/MM3 (ref 140–450)
PMV BLD AUTO: 9.4 FL (ref 6–12)
PMV BLD AUTO: 9.5 FL (ref 6–12)
POTASSIUM SERPL-SCNC: 3.5 MMOL/L (ref 3.5–5.2)
POTASSIUM SERPL-SCNC: 4.2 MMOL/L (ref 3.5–5.2)
PROT SERPL-MCNC: 8.3 G/DL (ref 6–8.5)
PROT UR QL STRIP: ABNORMAL
QT INTERVAL: 328 MS
QTC INTERVAL: 454 MS
RBC # BLD AUTO: 4.05 10*6/MM3 (ref 3.77–5.28)
RBC # BLD AUTO: 5.15 10*6/MM3 (ref 3.77–5.28)
RBC # UR STRIP: ABNORMAL /HPF
REF LAB TEST METHOD: ABNORMAL
SALICYLATES SERPL-MCNC: <0.3 MG/DL
SODIUM SERPL-SCNC: 141 MMOL/L (ref 136–145)
SODIUM SERPL-SCNC: 142 MMOL/L (ref 136–145)
SP GR UR STRIP: 1.01 (ref 1–1.03)
SQUAMOUS #/AREA URNS HPF: ABNORMAL /HPF
UROBILINOGEN UR QL STRIP: ABNORMAL
WBC # UR STRIP: ABNORMAL /HPF
WBC NRBC COR # BLD AUTO: 11.12 10*3/MM3 (ref 3.4–10.8)
WBC NRBC COR # BLD AUTO: 9.22 10*3/MM3 (ref 3.4–10.8)

## 2024-12-10 PROCEDURE — G0378 HOSPITAL OBSERVATION PER HR: HCPCS

## 2024-12-10 PROCEDURE — 83690 ASSAY OF LIPASE: CPT | Performed by: EMERGENCY MEDICINE

## 2024-12-10 PROCEDURE — 25010000002 THIAMINE HCL 200 MG/2ML SOLUTION: Performed by: STUDENT IN AN ORGANIZED HEALTH CARE EDUCATION/TRAINING PROGRAM

## 2024-12-10 PROCEDURE — 80307 DRUG TEST PRSMV CHEM ANLYZR: CPT | Performed by: EMERGENCY MEDICINE

## 2024-12-10 PROCEDURE — 25010000002 THIAMINE PER 100 MG: Performed by: STUDENT IN AN ORGANIZED HEALTH CARE EDUCATION/TRAINING PROGRAM

## 2024-12-10 PROCEDURE — 25810000003 SODIUM CHLORIDE 0.9 % SOLUTION: Performed by: STUDENT IN AN ORGANIZED HEALTH CARE EDUCATION/TRAINING PROGRAM

## 2024-12-10 PROCEDURE — 96375 TX/PRO/DX INJ NEW DRUG ADDON: CPT

## 2024-12-10 PROCEDURE — 96366 THER/PROPH/DIAG IV INF ADDON: CPT

## 2024-12-10 PROCEDURE — 96361 HYDRATE IV INFUSION ADD-ON: CPT

## 2024-12-10 PROCEDURE — 25010000002 ONDANSETRON PER 1 MG: Performed by: STUDENT IN AN ORGANIZED HEALTH CARE EDUCATION/TRAINING PROGRAM

## 2024-12-10 PROCEDURE — 84100 ASSAY OF PHOSPHORUS: CPT | Performed by: HOSPITALIST

## 2024-12-10 PROCEDURE — 80053 COMPREHEN METABOLIC PANEL: CPT | Performed by: EMERGENCY MEDICINE

## 2024-12-10 PROCEDURE — 25010000002 LORAZEPAM PER 2 MG: Performed by: EMERGENCY MEDICINE

## 2024-12-10 PROCEDURE — 96376 TX/PRO/DX INJ SAME DRUG ADON: CPT

## 2024-12-10 PROCEDURE — 85027 COMPLETE CBC AUTOMATED: CPT | Performed by: STUDENT IN AN ORGANIZED HEALTH CARE EDUCATION/TRAINING PROGRAM

## 2024-12-10 PROCEDURE — 25810000003 LACTATED RINGERS SOLUTION: Performed by: EMERGENCY MEDICINE

## 2024-12-10 PROCEDURE — 90791 PSYCH DIAGNOSTIC EVALUATION: CPT | Performed by: SOCIAL WORKER

## 2024-12-10 PROCEDURE — 96365 THER/PROPH/DIAG IV INF INIT: CPT

## 2024-12-10 PROCEDURE — 71045 X-RAY EXAM CHEST 1 VIEW: CPT

## 2024-12-10 PROCEDURE — 25010000002 PHENOBARBITAL PER 120 MG: Performed by: EMERGENCY MEDICINE

## 2024-12-10 PROCEDURE — 25010000002 THIAMINE HCL 200 MG/2ML SOLUTION: Performed by: EMERGENCY MEDICINE

## 2024-12-10 PROCEDURE — 81001 URINALYSIS AUTO W/SCOPE: CPT

## 2024-12-10 PROCEDURE — 83735 ASSAY OF MAGNESIUM: CPT | Performed by: EMERGENCY MEDICINE

## 2024-12-10 PROCEDURE — 80074 ACUTE HEPATITIS PANEL: CPT

## 2024-12-10 RX ORDER — ACETAMINOPHEN 325 MG/1
650 TABLET ORAL EVERY 4 HOURS PRN
Status: DISCONTINUED | OUTPATIENT
Start: 2024-12-10 | End: 2024-12-12 | Stop reason: HOSPADM

## 2024-12-10 RX ORDER — DIAZEPAM 10 MG/2ML
20 INJECTION, SOLUTION INTRAMUSCULAR; INTRAVENOUS
Status: DISCONTINUED | OUTPATIENT
Start: 2024-12-10 | End: 2024-12-12 | Stop reason: HOSPADM

## 2024-12-10 RX ORDER — MULTIPLE VITAMINS W/ MINERALS TAB 9MG-400MCG
1 TAB ORAL DAILY
Status: DISCONTINUED | OUTPATIENT
Start: 2024-12-10 | End: 2024-12-12 | Stop reason: HOSPADM

## 2024-12-10 RX ORDER — BISACODYL 10 MG
10 SUPPOSITORY, RECTAL RECTAL DAILY PRN
Status: DISCONTINUED | OUTPATIENT
Start: 2024-12-10 | End: 2024-12-12 | Stop reason: HOSPADM

## 2024-12-10 RX ORDER — DIAZEPAM 5 MG/1
20 TABLET ORAL
Status: DISCONTINUED | OUTPATIENT
Start: 2024-12-10 | End: 2024-12-12 | Stop reason: HOSPADM

## 2024-12-10 RX ORDER — THIAMINE HYDROCHLORIDE 100 MG/ML
200 INJECTION, SOLUTION INTRAMUSCULAR; INTRAVENOUS EVERY 8 HOURS SCHEDULED
Status: DISCONTINUED | OUTPATIENT
Start: 2024-12-10 | End: 2024-12-12 | Stop reason: HOSPADM

## 2024-12-10 RX ORDER — SODIUM CHLORIDE 9 MG/ML
100 INJECTION, SOLUTION INTRAVENOUS CONTINUOUS
Status: ACTIVE | OUTPATIENT
Start: 2024-12-10 | End: 2024-12-12

## 2024-12-10 RX ORDER — NITROGLYCERIN 0.4 MG/1
0.4 TABLET SUBLINGUAL
Status: DISCONTINUED | OUTPATIENT
Start: 2024-12-10 | End: 2024-12-12 | Stop reason: HOSPADM

## 2024-12-10 RX ORDER — ONDANSETRON 2 MG/ML
4 INJECTION INTRAMUSCULAR; INTRAVENOUS EVERY 6 HOURS PRN
Status: DISCONTINUED | OUTPATIENT
Start: 2024-12-10 | End: 2024-12-12 | Stop reason: HOSPADM

## 2024-12-10 RX ORDER — ONDANSETRON 4 MG/1
4 TABLET, ORALLY DISINTEGRATING ORAL EVERY 6 HOURS PRN
Status: DISCONTINUED | OUTPATIENT
Start: 2024-12-10 | End: 2024-12-12 | Stop reason: HOSPADM

## 2024-12-10 RX ORDER — CLONIDINE HYDROCHLORIDE 0.1 MG/1
0.1 TABLET ORAL EVERY 4 HOURS PRN
Status: DISCONTINUED | OUTPATIENT
Start: 2024-12-10 | End: 2024-12-12 | Stop reason: HOSPADM

## 2024-12-10 RX ORDER — DIAZEPAM 5 MG/1
10 TABLET ORAL
Status: DISCONTINUED | OUTPATIENT
Start: 2024-12-10 | End: 2024-12-12 | Stop reason: HOSPADM

## 2024-12-10 RX ORDER — DIAZEPAM 10 MG/2ML
10 INJECTION, SOLUTION INTRAMUSCULAR; INTRAVENOUS
Status: DISCONTINUED | OUTPATIENT
Start: 2024-12-10 | End: 2024-12-12 | Stop reason: HOSPADM

## 2024-12-10 RX ORDER — POLYETHYLENE GLYCOL 3350 17 G/17G
17 POWDER, FOR SOLUTION ORAL DAILY PRN
Status: DISCONTINUED | OUTPATIENT
Start: 2024-12-10 | End: 2024-12-12 | Stop reason: HOSPADM

## 2024-12-10 RX ORDER — DIAZEPAM 5 MG/1
15 TABLET ORAL
Status: DISCONTINUED | OUTPATIENT
Start: 2024-12-10 | End: 2024-12-12 | Stop reason: HOSPADM

## 2024-12-10 RX ORDER — DIAZEPAM 10 MG/2ML
15 INJECTION, SOLUTION INTRAMUSCULAR; INTRAVENOUS
Status: DISCONTINUED | OUTPATIENT
Start: 2024-12-10 | End: 2024-12-12 | Stop reason: HOSPADM

## 2024-12-10 RX ORDER — AMOXICILLIN 250 MG
2 CAPSULE ORAL 2 TIMES DAILY PRN
Status: DISCONTINUED | OUTPATIENT
Start: 2024-12-10 | End: 2024-12-12 | Stop reason: HOSPADM

## 2024-12-10 RX ORDER — BISACODYL 5 MG/1
5 TABLET, DELAYED RELEASE ORAL DAILY PRN
Status: DISCONTINUED | OUTPATIENT
Start: 2024-12-10 | End: 2024-12-12 | Stop reason: HOSPADM

## 2024-12-10 RX ADMIN — FOLIC ACID 1 MG: 5 INJECTION, SOLUTION INTRAMUSCULAR; INTRAVENOUS; SUBCUTANEOUS at 01:58

## 2024-12-10 RX ADMIN — PHENOBARBITAL SODIUM 195 MG: 65 INJECTION INTRAMUSCULAR at 01:57

## 2024-12-10 RX ADMIN — THIAMINE HYDROCHLORIDE 200 MG: 100 INJECTION, SOLUTION INTRAMUSCULAR; INTRAVENOUS at 21:50

## 2024-12-10 RX ADMIN — CLONIDINE HYDROCHLORIDE 0.1 MG: 0.1 TABLET ORAL at 20:19

## 2024-12-10 RX ADMIN — ONDANSETRON 4 MG: 2 INJECTION INTRAMUSCULAR; INTRAVENOUS at 20:43

## 2024-12-10 RX ADMIN — SODIUM CHLORIDE 100 ML/HR: 9 INJECTION, SOLUTION INTRAVENOUS at 03:33

## 2024-12-10 RX ADMIN — PHENOBARBITAL SODIUM 195 MG: 65 INJECTION INTRAMUSCULAR at 05:57

## 2024-12-10 RX ADMIN — SODIUM CHLORIDE, POTASSIUM CHLORIDE, SODIUM LACTATE AND CALCIUM CHLORIDE 1000 ML: 600; 310; 30; 20 INJECTION, SOLUTION INTRAVENOUS at 00:05

## 2024-12-10 RX ADMIN — FOLIC ACID 1 MG: 5 INJECTION, SOLUTION INTRAMUSCULAR; INTRAVENOUS; SUBCUTANEOUS at 15:03

## 2024-12-10 RX ADMIN — Medication 5 MG: at 20:19

## 2024-12-10 RX ADMIN — LORAZEPAM 1 MG: 2 INJECTION INTRAMUSCULAR; INTRAVENOUS at 00:05

## 2024-12-10 RX ADMIN — ONDANSETRON 4 MG: 2 INJECTION INTRAMUSCULAR; INTRAVENOUS at 08:54

## 2024-12-10 RX ADMIN — PHENOBARBITAL SODIUM 260 MG: 65 INJECTION INTRAMUSCULAR at 01:40

## 2024-12-10 RX ADMIN — THIAMINE HYDROCHLORIDE 100 MG: 100 INJECTION, SOLUTION INTRAMUSCULAR; INTRAVENOUS at 00:05

## 2024-12-10 RX ADMIN — SODIUM CHLORIDE 100 ML/HR: 9 INJECTION, SOLUTION INTRAVENOUS at 20:46

## 2024-12-10 RX ADMIN — THIAMINE HYDROCHLORIDE 200 MG: 100 INJECTION, SOLUTION INTRAMUSCULAR; INTRAVENOUS at 15:03

## 2024-12-10 RX ADMIN — THIAMINE HYDROCHLORIDE 200 MG: 100 INJECTION, SOLUTION INTRAMUSCULAR; INTRAVENOUS at 05:57

## 2024-12-10 NOTE — ED NOTES
Nursing report ED to floor  Adriel Armenta  29 y.o.  female    HPI :   Chief Complaint   Patient presents with    Drug / Alcohol Assessment    Suicidal       Admitting doctor:   John Barrera MD    Admitting diagnosis:   The primary encounter diagnosis was Nausea and vomiting, unspecified vomiting type. Diagnoses of Alcohol abuse, Alcoholic intoxication with complication, Polysubstance abuse, Alcohol-induced chronic pancreatitis, and Uncontrolled hypertension were also pertinent to this visit.    Code status:   Current Code Status       Date Active Code Status Order ID Comments User Context       12/10/2024 0224 CPR (Attempt to Resuscitate) [id number]  Brandy Pierce APRN ED        Question Answer    Code Status (Patient has no pulse and is not breathing) CPR (Attempt to Resuscitate)    Medical Interventions (Patient has pulse or is breathing) Full                    Allergies:   Azithromycin, Iodinated contrast media, and Penicillins    Isolation:   No active isolations    Intake and Output    Intake/Output Summary (Last 24 hours) at 12/10/2024 0702  Last data filed at 12/10/2024 0100  Gross per 24 hour   Intake 237 ml   Output --   Net 237 ml       Weight:   There were no vitals filed for this visit.    Most recent vitals:   Vitals:    12/10/24 0200 12/10/24 0230 12/10/24 0300 12/10/24 0632   BP: (!) 147/104 130/97 130/84 128/88   BP Location: Right arm Right arm Right arm Left arm   Patient Position: Lying Lying Lying    Pulse: 104 104 112 99   Resp:    16   Temp:       TempSrc:       SpO2: 97% 100% 97% 94%       Active LDAs/IV Access:   Lines, Drains & Airways       Active LDAs       Name Placement date Placement time Site Days    Peripheral IV 12/10/24 0005 Anterior;Left Forearm 12/10/24  0005  Forearm  less than 1    Peripheral IV 12/10/24 0028 Left Antecubital 12/10/24  0028  Antecubital  less than 1                    Labs (abnormal labs have a star):   Labs Reviewed   COMPREHENSIVE METABOLIC PANEL -  Abnormal; Notable for the following components:       Result Value    ALT (SGPT) 47 (*)     AST (SGOT) 85 (*)     All other components within normal limits    Narrative:     GFR Normal >60  Chronic Kidney Disease <60  Kidney Failure <15     ETHANOL - Abnormal; Notable for the following components:    Ethanol 351 (*)     All other components within normal limits   URINE DRUG SCREEN - Abnormal; Notable for the following components:    Cocaine Screen, Urine Positive (*)     THC, Screen, Urine Positive (*)     All other components within normal limits    Narrative:     Negative Thresholds Per Drugs Screened:    Amphetamines                 500 ng/ml  Barbiturates                 200 ng/ml  Benzodiazepines              100 ng/ml  Cocaine                      300 ng/ml  Methadone                    300 ng/ml  Opiates                      300 ng/ml  Oxycodone                    100 ng/ml  THC                           50 ng/ml  Fentanyl                       5 ng/ml      The Normal Value for all drugs tested is negative. This report includes final unconfirmed screening results to be used for medical treatment purposes only. Unconfirmed results must not be used for non-medical purposes such as employment or legal testing. Clinical consideration should be applied to any drug of abuse test, particularly when unconfirmed results are used.           CBC WITH AUTO DIFFERENTIAL - Abnormal; Notable for the following components:    WBC 11.12 (*)     Hemoglobin 16.8 (*)     Hematocrit 48.2 (*)     Monocyte % 4.0 (*)     Lymphocytes, Absolute 3.99 (*)     All other components within normal limits   LIPASE - Abnormal; Notable for the following components:    Lipase 87 (*)     All other components within normal limits   BASIC METABOLIC PANEL - Abnormal; Notable for the following components:    Anion Gap 15.9 (*)     All other components within normal limits    Narrative:     GFR Normal >60  Chronic Kidney Disease <60  Kidney Failure  <15     CBC (NO DIFF) - Abnormal; Notable for the following components:    MCH 34.1 (*)     MCHC 35.9 (*)     All other components within normal limits   HCG, SERUM, QUALITATIVE - Normal   ACETAMINOPHEN LEVEL - Normal   SALICYLATE LEVEL - Normal    Narrative:     Therapeutic range for Salicylates:  3.0 - 10.0 mg/dL for antipyretic/analgesic conditions  15.0 - 30.0 mg/dL for anti-inflammatory conditions   MAGNESIUM - Normal   HEPATITIS PANEL, ACUTE - Normal    Narrative:     Results may be falsely decreased if patient taking Biotin.    URINALYSIS W/ MICROSCOPIC IF INDICATED (NO CULTURE)   CBC AND DIFFERENTIAL    Narrative:     The following orders were created for panel order CBC & Differential.  Procedure                               Abnormality         Status                     ---------                               -----------         ------                     CBC Auto Differential[[id number]]        Abnormal            Final result                 Please view results for these tests on the individual orders.       EKG:   ECG 12 Lead Electrolyte Imbalance   Preliminary Result   HEART NRMV=039  bpm   RR Fdkqhgfa=556  ms   SC Cwiaeujf=277  ms   P Horizontal Axis=5  deg   P Front Axis=79  deg   QRSD Interval=86  ms   QT Lxerirhs=587  ms   URlM=731  ms   QRS Axis=76  deg   T Wave Axis=-5  deg   - OTHERWISE NORMAL ECG -   Sinus tachycardia   Atrial premature complex   Date and Time of Study:2024-12-09 23:39:33          Meds given in ED:   Medications   sodium chloride 0.9 % flush 10 mL (has no administration in time range)   nitroglycerin (NITROSTAT) SL tablet 0.4 mg (has no administration in time range)   acetaminophen (TYLENOL) tablet 650 mg (has no administration in time range)   sennosides-docusate (PERICOLACE) 8.6-50 MG per tablet 2 tablet (has no administration in time range)     And   polyethylene glycol (MIRALAX) packet 17 g (has no administration in time range)     And   bisacodyl (DULCOLAX) EC tablet 5 mg  (has no administration in time range)     And   bisacodyl (DULCOLAX) suppository 10 mg (has no administration in time range)   ondansetron ODT (ZOFRAN-ODT) disintegrating tablet 4 mg (has no administration in time range)     Or   ondansetron (ZOFRAN) injection 4 mg (has no administration in time range)   cloNIDine (CATAPRES) tablet 0.1 mg (has no administration in time range)   Magnesium Standard Dose Replacement - Follow Nurse / BPA Driven Protocol (has no administration in time range)   sodium chloride 0.9 % infusion (100 mL/hr Intravenous New Bag 12/10/24 0333)   thiamine (B-1) injection 200 mg (200 mg Intravenous Given 12/10/24 0557)     Followed by   thiamine (VITAMIN B-1) tablet 100 mg (has no administration in time range)   folic acid 1 mg in sodium chloride 0.9 % 50 mL IVPB (has no administration in time range)   multivitamin with minerals 1 tablet (has no administration in time range)   diazePAM (VALIUM) tablet 10 mg (has no administration in time range)     Or   diazePAM (VALIUM) injection 10 mg (has no administration in time range)     Or   diazePAM (VALIUM) tablet 15 mg (has no administration in time range)     Or   diazePAM (VALIUM) injection 15 mg (has no administration in time range)     Or   diazePAM (VALIUM) tablet 20 mg (has no administration in time range)     Or   diazePAM (VALIUM) injection 20 mg (has no administration in time range)   thiamine (B-1) injection 100 mg (100 mg Intravenous Given 12/10/24 0005)   folic acid 1 mg in sodium chloride 0.9 % 50 mL IVPB (0 mg Intravenous Stopped 12/10/24 0250)   lactated ringers bolus 1,000 mL (0 mL Intravenous Stopped 12/10/24 0251)   LORazepam (ATIVAN) injection 1 mg (1 mg Intravenous Given 12/10/24 0005)   PHENobarbital 260 mg in sodium chloride 0.9 % 100 mL IVPB (0 mg Intravenous Stopped 12/10/24 0251)     Followed by   PHENobarbital 195 mg in sodium chloride 0.9 % 100 mL IVPB (0 mg Intravenous Stopped 12/10/24 0251)     Followed by   PHENobarbital  195 mg in sodium chloride 0.9 % 100 mL IVPB (195 mg Intravenous New Bag 12/10/24 0557)       Imaging results:  XR Chest 1 View    Result Date: 12/10/2024  Linear scarring versus atelectasis noted within the left midlung.  This report was finalized on 12/10/2024 2:51 AM by Dr. Teri Grullon M.D on Workstation: [id number]       Ambulatory status:   - ambulatory    Social issues:   Social History     Socioeconomic History    Marital status: Single   Tobacco Use    Smoking status: Former     Current packs/day: 5.00     Average packs/day: 5.0 packs/day for 3.0 years (15.0 ttl pk-yrs)     Types: Cigarettes    Smokeless tobacco: Never   Vaping Use    Vaping status: Every Day    Substances: Nicotine, Flavoring    Devices: Disposable   Substance and Sexual Activity    Alcohol use: Yes     Comment: approx fifth of vodka daily    Drug use: Yes     Types: Cocaine(coke)    Sexual activity: Yes     Partners: Male     Birth control/protection: OCP       NIH Stroke Scale:       Martinez Wright RN  12/10/24 07:02 EST

## 2024-12-10 NOTE — PLAN OF CARE
Goal Outcome Evaluation:  Plan of Care Reviewed With: patient        Progress: no change  Outcome Evaluation: admit this day from ER for alcohol abuse. vss. a&ox4. ra. ambulates ad hui. IVF per order. sitter at bedside. reg diet. c/o minial nausea this shift. educated on bp monitoring. dc plan tbd at this time. con't plan of care.

## 2024-12-10 NOTE — CONSULTS
"[facility] evaluated 29-year-old female for alcohol and drug abuse.  Patient also indicated to staff on arrival that she was feeling suicidal.  Patient's BAL at arrival was 351 and her UDS was positive for cocaine and THC.  Patient is currently denying any SI but states that her behavior was reckless and she did not care if she lived or .  Patient states she does not do cocaine but once a month but when she does it, she tends to overdo it.  Patient states she has had recent sobriety that included 20 days and then another 15 days after a slip.  Patient detoxed at the [facility] for several days.  Patient then started daily AA meetings and was able to get a sponsor whom she had met on numerous occasions.  Patient slipped again over the weekend and since then has had 1/5 of liquor each day and then used cocaine over that time.  Again, patient denies current SI and called this a \"dramatic wake-up call\".  Patient states that she finally feels convinced that alcoholism is a disease not something that she can just quit when she feels like it.  Patient states she found AA meetings that she likes and also likes her sponsor and plans to return to do that.  Patient states she had one suicide attempt at age 14 where her mother interrupted it and she was inpatient at the [facility].  Patient states she started drinking at age 14.  Patient has no other treatment in place from the past other than the recent detox at [facility].  Patient's father is a recovering alcoholic and helps give her support with her sobriety.  Patient states she is just now starting to feel some of the grief after her mother  of cancer in 2023 and her boyfriend  in May 2023.  Access talked to patient about her grief being more present because the alcohol has not been numbing her.  Patient states her sleep and appetite are both good when she is sober.  Patient states she does not tend to be depressed " but can get that way when drinking.  Patient rates her current depressed mood as a 4/10.  Patient states her anxiety is low.  Patient also has the grief of losing her friends because they are all drinkers.    Patient works as a  at a restaurant and lives in an apartment by herself.  Patient states she is taking up cooking again as she enjoys that as a hobby.  Access will follow and talk to patient about different substance abuse treatment options and patient will return to  with her sponsor.

## 2024-12-10 NOTE — H&P
Patient Name:  Adriel Armenta  YOB: 1995  MRN:  [id number]  Admit Date:  12/9/2024  Patient Care Team:  Provider, No Known as PCP - General      Subjective   History Present Illness     Chief Complaint   Patient presents with    Drug / Alcohol Assessment    Suicidal     History of Present Illness   is a 29-year-old female with history of substance abuse, alcohol abuse who presents to the emergency room initially complaint of abdominal pain and wanting help with quitting alcohol.  Patient was very tearful in the emergency room, her alcohol level was 351, her urine drug screen is positive for cocaine and THC.  Complains of some nausea that started earlier in the day.  She states her abdominal pain is coming and going, during my exam she has no abdominal pain.  She is very tearful and states she is depressed and wants to become sober.  She denies any suicidal ideations or plan to harm herself or others.  In the emergency room her AST and ALT are slightly elevated 85 and 47, but about a month ago they were 303 and 166 when she was admitted for alcoholic pancreatitis.  Her lipase is also slightly elevated at 87, but trending down significantly from previous admission.  Her white blood cell count is elevated at 11.1, she denies any difficulty with urination, no fever.  Her hemoglobin is 16.8 with hematocrit 48.2, will hydrate and recheck labs.  Her hepatitis panel is negative, Tylenol level was normal.    Review of Systems   Constitutional:  Negative for appetite change and fever.   HENT:  Negative for nosebleeds and trouble swallowing.    Eyes:  Negative for photophobia, redness and visual disturbance.   Respiratory:  Negative for cough, chest tightness, shortness of breath and wheezing.    Cardiovascular:  Negative for chest pain, palpitations and leg swelling.   Gastrointestinal:  Positive for abdominal pain and nausea. Negative for abdominal distention and vomiting.   Endocrine:  Negative.    Genitourinary: Negative.    Musculoskeletal:  Negative for gait problem and joint swelling.   Skin: Negative.    Neurological:  Negative for dizziness, seizures, speech difficulty, light-headedness and headaches.   Hematological: Negative.    Psychiatric/Behavioral:  Positive for agitation. Negative for behavioral problems and confusion. The patient is nervous/anxious.         Personal History     Past Medical History:   Diagnosis Date    ASCUS of cervix with negative high risk HPV 09/05/2014    Hydrocalycosis     Hypertension     Kidney stone     LGSIL on Pap smear of cervix 03/12/2014    HPV POSITIVE    Pancreatitis      Past Surgical History:   Procedure Laterality Date    APPENDECTOMY       Family History   Problem Relation Age of Onset    No Known Problems Mother     Diabetes Father     Hyperlipidemia Father     No Known Problems Brother      Social History     Tobacco Use    Smoking status: Former     Current packs/day: 5.00     Average packs/day: 5.0 packs/day for 3.0 years (15.0 ttl pk-yrs)     Types: Cigarettes    Smokeless tobacco: Never   Vaping Use    Vaping status: Every Day    Substances: Nicotine, Flavoring    Devices: Disposable   Substance Use Topics    Alcohol use: Yes     Comment: approx fifth of vodka daily    Drug use: Yes     Types: Cocaine(coke)     No current facility-administered medications on file prior to encounter.     Current Outpatient Medications on File Prior to Encounter   Medication Sig Dispense Refill    amLODIPine (NORVASC) 2.5 MG tablet Take 1 tablet by mouth Daily.      pantoprazole (PROTONIX) 40 MG EC tablet Take 1 tablet by mouth Daily. 60 tablet 0    sucralfate (CARAFATE) 1 GM/10ML suspension Take 10 mL by mouth 4 (Four) Times a Day With Meals & at Bedtime. 120 each 0     Allergies   Allergen Reactions    Azithromycin Hives    Iodinated Contrast Media Hives    Penicillins Hives       Objective    Objective     Vital Signs  Temp:  [98.1 °F (36.7 °C)] 98.1 °F  (36.7 °C)  Heart Rate:  [104-153] 112  Resp:  [22] 22  BP: (130-171)/() 130/84  SpO2:  [93 %-100 %] 97 %  on   ;   Device (Oxygen Therapy): room air  There is no height or weight on file to calculate BMI.    Physical Exam  Vitals and nursing note reviewed.   Constitutional:       General: She is not in acute distress.     Appearance: She is well-developed.   HENT:      Head: Normocephalic.   Neck:      Vascular: No JVD.   Cardiovascular:      Rate and Rhythm: Normal rate and regular rhythm.      Comments: On the monitor with heart rate 94 during my exam, she denies chest pain  Pulmonary:      Effort: Pulmonary effort is normal.      Breath sounds: Normal breath sounds.      Comments: Lung sounds clear, sats 95%  Abdominal:      General: There is no distension.      Palpations: Abdomen is soft.      Tenderness: There is no abdominal tenderness.   Musculoskeletal:         General: Normal range of motion.      Cervical back: Normal range of motion.   Skin:     General: Skin is warm and dry.      Capillary Refill: Capillary refill takes less than 2 seconds.   Neurological:      General: No focal deficit present.      Mental Status: She is alert and oriented to person, place, and time.   Psychiatric:         Mood and Affect: Mood is anxious.         Behavior: Behavior normal.         Results Review:  I reviewed the patient's new clinical results.  I reviewed the patient's new imaging results and agree with the interpretation.  I reviewed the patient's other test results and agree with the interpretation  I personally viewed and interpreted the patient's EKG/Telemetry data  Discussed with ED provider.    Lab Results (last 24 hours)       Procedure Component Value Units Date/Time    CBC & Differential [[id number]]  (Abnormal) Collected: 12/09/24 9600    Specimen: Blood from Arm, Left Updated: 12/10/24 0021    Narrative:      The following orders were created for panel order CBC & Differential.  Procedure                                Abnormality         Status                     ---------                               -----------         ------                     CBC Auto Differential[[id number]]        Abnormal            Final result                 Please view results for these tests on the individual orders.    hCG, Serum, Qualitative [[id number]]  (Normal) Collected: 12/09/24 2359    Specimen: Blood from Arm, Left Updated: 12/10/24 0032     HCG Qualitative Negative    Acetaminophen Level [[id number]]  (Normal) Collected: 12/09/24 2359    Specimen: Blood from Arm, Left Updated: 12/10/24 0038     Acetaminophen <5.0 mcg/mL     Ethanol [[id number]]  (Abnormal) Collected: 12/09/24 2359    Specimen: Blood from Arm, Left Updated: 12/10/24 0038     Ethanol 351 mg/dL      Ethanol % 0.351 %     Salicylate Level [[id number]]  (Normal) Collected: 12/09/24 2359    Specimen: Blood from Arm, Left Updated: 12/10/24 0038     Salicylate <0.3 mg/dL     Narrative:      Therapeutic range for Salicylates:  3.0 - 10.0 mg/dL for antipyretic/analgesic conditions  15.0 - 30.0 mg/dL for anti-inflammatory conditions    CBC Auto Differential [[id number]]  (Abnormal) Collected: 12/09/24 2359    Specimen: Blood from Arm, Left Updated: 12/10/24 0021     WBC 11.12 10*3/mm3      RBC 5.15 10*6/mm3      Hemoglobin 16.8 g/dL      Hematocrit 48.2 %      MCV 93.6 fL      MCH 32.6 pg      MCHC 34.9 g/dL      RDW 13.2 %      RDW-SD 44.5 fl      MPV 9.5 fL      Platelets 301 10*3/mm3      Neutrophil % 58.5 %      Lymphocyte % 35.9 %      Monocyte % 4.0 %      Eosinophil % 0.3 %      Basophil % 1.1 %      Immature Grans % 0.2 %      Neutrophils, Absolute 6.51 10*3/mm3      Lymphocytes, Absolute 3.99 10*3/mm3      Monocytes, Absolute 0.45 10*3/mm3      Eosinophils, Absolute 0.03 10*3/mm3      Basophils, Absolute 0.12 10*3/mm3      Immature Grans, Absolute 0.02 10*3/mm3      nRBC 0.0 /100 WBC     Urine Drug Screen - Urine, Clean Catch [[id number]]  (Abnormal) Collected:  12/10/24 0029    Specimen: Urine, Clean Catch Updated: 12/10/24 0106     Amphet/Methamphet, Screen Negative     Barbiturates Screen, Urine Negative     Benzodiazepine Screen, Urine Negative     Cocaine Screen, Urine Positive     Opiate Screen Negative     THC, Screen, Urine Positive     Methadone Screen, Urine Negative     Oxycodone Screen, Urine Negative     Fentanyl, Urine Negative    Narrative:      Negative Thresholds Per Drugs Screened:    Amphetamines                 500 ng/ml  Barbiturates                 200 ng/ml  Benzodiazepines              100 ng/ml  Cocaine                      300 ng/ml  Methadone                    300 ng/ml  Opiates                      300 ng/ml  Oxycodone                    100 ng/ml  THC                           50 ng/ml  Fentanyl                       5 ng/ml      The Normal Value for all drugs tested is negative. This report includes final unconfirmed screening results to be used for medical treatment purposes only. Unconfirmed results must not be used for non-medical purposes such as employment or legal testing. Clinical consideration should be applied to any drug of abuse test, particularly when unconfirmed results are used.            Comprehensive Metabolic Panel [[id number]]  (Abnormal) Collected: 12/10/24 0045    Specimen: Blood from Arm, Left Updated: 12/10/24 0120     Glucose 87 mg/dL      BUN 7 mg/dL      Creatinine 0.86 mg/dL      Sodium 142 mmol/L      Potassium 4.2 mmol/L      Comment: Slight hemolysis detected by analyzer. Result may be falsely elevated.        Chloride 103 mmol/L      CO2 24.4 mmol/L      Calcium 9.2 mg/dL      Total Protein 8.3 g/dL      Albumin 3.9 g/dL      ALT (SGPT) 47 U/L      AST (SGOT) 85 U/L      Comment: Slight hemolysis detected by analyzer. Result may be falsely elevated.        Alkaline Phosphatase 82 U/L      Total Bilirubin 0.5 mg/dL      Globulin 4.4 gm/dL      A/G Ratio 0.9 g/dL      BUN/Creatinine Ratio 8.1     Anion Gap 14.6  mmol/L      eGFR 93.3 mL/min/1.73     Narrative:      GFR Normal >60  Chronic Kidney Disease <60  Kidney Failure <15      Magnesium [[id number]]  (Normal) Collected: 12/10/24 0045    Specimen: Blood from Arm, Left Updated: 12/10/24 0120     Magnesium 1.9 mg/dL     Lipase [[id number]]  (Abnormal) Collected: 12/10/24 0045    Specimen: Blood from Arm, Left Updated: 12/10/24 0116     Lipase 87 U/L     Hepatitis Panel, Acute [[id number]]  (Normal) Collected: 12/10/24 0333    Specimen: Blood Updated: 12/10/24 0413     Hepatitis B Surface Ag Non-Reactive     Hep A IgM Non-Reactive     Hep B C IgM Non-Reactive     Hepatitis C Ab Non-Reactive    Narrative:      Results may be falsely decreased if patient taking Biotin.             Imaging Results (Last 24 Hours)       Procedure Component Value Units Date/Time    XR Chest 1 View [[id number]] Collected: 12/10/24 0250     Updated: 12/10/24 0254    Narrative:      SINGLE VIEW OF THE CHEST     HISTORY: Leukocytosis     COMPARISON: November 20, 2023     FINDINGS:  Heart size is within normal limits. No pneumothorax, pleural effusion,  or acute infiltrate is seen. There is some linear atelectasis versus  scarring noted within the left midlung.       Impression:      Linear scarring versus atelectasis noted within the left midlung.     This report was finalized on 12/10/2024 2:51 AM by Dr. Teri Grullon M.D on Workstation: [id number]                   ECG 12 Lead Electrolyte Imbalance   Preliminary Result   HEART VRQY=649  bpm   RR Wawsrehx=896  ms   IA Euwyhueb=978  ms   P Horizontal Axis=5  deg   P Front Axis=79  deg   QRSD Interval=86  ms   QT Xondazor=637  ms   OQoX=081  ms   QRS Axis=76  deg   T Wave Axis=-5  deg   - OTHERWISE NORMAL ECG -   Sinus tachycardia   Atrial premature complex   Date and Time of Study:2024-12-09 23:39:33           Assessment/Plan     Active Hospital Problems    Diagnosis  POA    **Alcohol abuse [F10.10]  Yes    Polysubstance abuse [F19.10]  Yes     Transaminitis [R74.01]  Yes    Cocaine use disorder [F14.10]  Yes      is a 29-year-old female with history of substance abuse, alcohol abuse who presents to the emergency room initially complaint of abdominal pain and wanting help with quitting alcohol.     Alcohol abuse/polysubstance abuse/transaminitis  -Telemetry unit for monitoring   -CIWA protocol initiated with phenobarbital and Valium  -Consult access center, patient requesting help with alcohol abuse  -Repeat CMP and CBC in a.m.  -Thiamine and folic acid started, continue that daily  -Normal saline at 100 cc an hour overnight    I discussed the patient's findings and my recommendations with patient.    VTE Prophylaxis - SCDs.  Code Status - Full code.       NATHALIE Shankar  [facility]  12/10/24  04:44 EST

## 2024-12-11 ENCOUNTER — APPOINTMENT (OUTPATIENT)
Dept: CT IMAGING | Facility: HOSPITAL | Age: 29
End: 2024-12-11
Payer: MEDICAID

## 2024-12-11 LAB
ALBUMIN SERPL-MCNC: 3.6 G/DL (ref 3.5–5.2)
ALBUMIN/GLOB SERPL: 1.2 G/DL
ALP SERPL-CCNC: 65 U/L (ref 39–117)
ALT SERPL W P-5'-P-CCNC: 39 U/L (ref 1–33)
ANION GAP SERPL CALCULATED.3IONS-SCNC: 12.5 MMOL/L (ref 5–15)
AST SERPL-CCNC: 59 U/L (ref 1–32)
BILIRUB SERPL-MCNC: 1.5 MG/DL (ref 0–1.2)
BUN SERPL-MCNC: 7 MG/DL (ref 6–20)
BUN/CREAT SERPL: 10.3 (ref 7–25)
CALCIUM SPEC-SCNC: 9 MG/DL (ref 8.6–10.5)
CHLORIDE SERPL-SCNC: 100 MMOL/L (ref 98–107)
CO2 SERPL-SCNC: 23.5 MMOL/L (ref 22–29)
CREAT SERPL-MCNC: 0.68 MG/DL (ref 0.57–1)
DEPRECATED RDW RBC AUTO: 44.4 FL (ref 37–54)
EGFRCR SERPLBLD CKD-EPI 2021: 120.3 ML/MIN/1.73
ERYTHROCYTE [DISTWIDTH] IN BLOOD BY AUTOMATED COUNT: 12.8 % (ref 12.3–15.4)
GLOBULIN UR ELPH-MCNC: 3 GM/DL
GLUCOSE SERPL-MCNC: 82 MG/DL (ref 65–99)
HCT VFR BLD AUTO: 37.8 % (ref 34–46.6)
HGB BLD-MCNC: 12.8 G/DL (ref 12–15.9)
LIPASE SERPL-CCNC: 72 U/L (ref 13–60)
MAGNESIUM SERPL-MCNC: 1.5 MG/DL (ref 1.6–2.6)
MCH RBC QN AUTO: 32.1 PG (ref 26.6–33)
MCHC RBC AUTO-ENTMCNC: 33.9 G/DL (ref 31.5–35.7)
MCV RBC AUTO: 94.7 FL (ref 79–97)
PHOSPHATE SERPL-MCNC: 3.2 MG/DL (ref 2.5–4.5)
PLATELET # BLD AUTO: 150 10*3/MM3 (ref 140–450)
PMV BLD AUTO: 10.3 FL (ref 6–12)
POTASSIUM SERPL-SCNC: 3.8 MMOL/L (ref 3.5–5.2)
PROT SERPL-MCNC: 6.6 G/DL (ref 6–8.5)
RBC # BLD AUTO: 3.99 10*6/MM3 (ref 3.77–5.28)
SODIUM SERPL-SCNC: 136 MMOL/L (ref 136–145)
WBC NRBC COR # BLD AUTO: 9.14 10*3/MM3 (ref 3.4–10.8)

## 2024-12-11 PROCEDURE — 83690 ASSAY OF LIPASE: CPT | Performed by: HOSPITALIST

## 2024-12-11 PROCEDURE — 25810000003 SODIUM CHLORIDE 0.9 % SOLUTION: Performed by: HOSPITALIST

## 2024-12-11 PROCEDURE — 25010000002 THIAMINE PER 100 MG: Performed by: STUDENT IN AN ORGANIZED HEALTH CARE EDUCATION/TRAINING PROGRAM

## 2024-12-11 PROCEDURE — 25010000002 MAGNESIUM SULFATE 2 GM/50ML SOLUTION: Performed by: STUDENT IN AN ORGANIZED HEALTH CARE EDUCATION/TRAINING PROGRAM

## 2024-12-11 PROCEDURE — G0378 HOSPITAL OBSERVATION PER HR: HCPCS

## 2024-12-11 PROCEDURE — 74176 CT ABD & PELVIS W/O CONTRAST: CPT

## 2024-12-11 PROCEDURE — 36415 COLL VENOUS BLD VENIPUNCTURE: CPT | Performed by: HOSPITALIST

## 2024-12-11 PROCEDURE — 84100 ASSAY OF PHOSPHORUS: CPT | Performed by: HOSPITALIST

## 2024-12-11 PROCEDURE — 96361 HYDRATE IV INFUSION ADD-ON: CPT

## 2024-12-11 PROCEDURE — 96376 TX/PRO/DX INJ SAME DRUG ADON: CPT

## 2024-12-11 PROCEDURE — 80053 COMPREHEN METABOLIC PANEL: CPT | Performed by: HOSPITALIST

## 2024-12-11 PROCEDURE — 83735 ASSAY OF MAGNESIUM: CPT | Performed by: HOSPITALIST

## 2024-12-11 PROCEDURE — 85027 COMPLETE CBC AUTOMATED: CPT | Performed by: HOSPITALIST

## 2024-12-11 RX ORDER — MAGNESIUM SULFATE HEPTAHYDRATE 40 MG/ML
2 INJECTION, SOLUTION INTRAVENOUS
Status: DISPENSED | OUTPATIENT
Start: 2024-12-11 | End: 2024-12-11

## 2024-12-11 RX ADMIN — FOLIC ACID 1 MG: 5 INJECTION, SOLUTION INTRAMUSCULAR; INTRAVENOUS; SUBCUTANEOUS at 08:23

## 2024-12-11 RX ADMIN — MAGNESIUM SULFATE HEPTAHYDRATE 2 G: 40 INJECTION, SOLUTION INTRAVENOUS at 13:00

## 2024-12-11 RX ADMIN — THIAMINE HYDROCHLORIDE 200 MG: 100 INJECTION, SOLUTION INTRAMUSCULAR; INTRAVENOUS at 05:32

## 2024-12-11 RX ADMIN — SODIUM CHLORIDE 100 ML/HR: 9 INJECTION, SOLUTION INTRAVENOUS at 08:23

## 2024-12-11 RX ADMIN — Medication 1 TABLET: at 08:23

## 2024-12-11 NOTE — THERAPY DISCHARGE NOTE
[facility]  [phone]    ACCESS CENTER STATEMENT OF DISPOSITION        I, Adriel Armenta, [facility] in [facility] on 12/11/2024.  I understand the recommendations below and what follow-up action is expected of me.      Outpatient Counseling Resources:    [facility] - [phone] or [email or web address] - [phone] or [email or web address]        ________________________________  Clinician Signature    12/11/2024  16:02 EST

## 2024-12-11 NOTE — PLAN OF CARE
Problem: Adult Inpatient Plan of Care  Goal: Plan of Care Review  Outcome: Progressing  Flowsheets (Taken 12/11/2024 9292)  Progress: no change  Outcome Evaluation:   A&OX4   BP elevated overnight, PRN clonidine given x1   ST on monitor   sitter at bedside throughout the night   on RA: up ad hui w/sitter   CT abd/pelvis to be done   will cont to monitor  Plan of Care Reviewed With: patient   Goal Outcome Evaluation:  Plan of Care Reviewed With: patient        Progress: no change  Outcome Evaluation: A&OX4; BP elevated overnight, PRN clonidine given x1; ST on monitor; sitter at bedside throughout the night; on RA: up ad hui w/sitter; CT abd/pelvis to be done; will cont to monitor

## 2024-12-11 NOTE — DISCHARGE SUMMARY
Patient Name: Adriel Armenta  : 1995  MRN: [id number]    Date of Admission: 2024  Date of Discharge:  2024  Primary Care Physician: Provider, No Known      Chief Complaint:   Drug / Alcohol Assessment and Suicidal      Discharge Diagnoses     Active Hospital Problems    Diagnosis  POA    **Alcohol abuse [F10.10]  Yes    Polysubstance abuse [F19.10]  Yes    Transaminitis [R74.01]  Yes    Cocaine use disorder [F14.10]  Yes      Resolved Hospital Problems   No resolved problems to display.        Hospital Course     Ms. Armenta is a 29 y.o. female with a history of GERD and HTN who presented to [facility] initially complaining of alcohol intoxication.  Please see the admitting history and physical for further details.  She was admitted to the hospital for further evaluation and treatment.      Patient was acutely intoxicated with alcohol on arrival to the ER. She reported a 15 day period of abstinence prior to binge drinking for one day. In the ER, she presented due to intoxication and desire for sobriety. She was admitted for further management. She was given phenobarb initially for concern for withdrawal and placed on the seawall protocol. The patient did not require any as needed medication's for symptoms of withdrawal. Given that she had not drank for 15 days prior to the one episode of binge drinking, may be less likely that she would experience significant withdrawal. The patient was ultimately requesting discharge. This was complicated by the fact that a sitter was ordered on admission due to the patient possibly endorsing suicidal thoughts. At my time of evaluation, the patient completely denied any thoughts of self harm or suicide. She said she was not sure what she said while intoxicated but did not have a plan to harm herself. She is very future oriented. [facility] was consulted and spoke with the psychiatrist on call. Per Dr. Turner, the patient was cleared  for discharge after speaking to her boyfriend. The patient's boyfriend thinks that she is safe for discharge home and he will be coming to get her from the hospital tonight. Safety contract was also filled out per access center. Spoke with the patient and instructed her to return to the hospital should she experience any symptoms of alcohol withdrawal or thoughts of self harm. She expressed understanding in this. She has already spoken with her sponsor and plans to go to an AA meeting immediately. BP a bit elevated throughout the day but home amlodipine was not restarted. The patient should resume at discharge. Transaminases were elevated on admission. CT abd/pelvis was without acute findings and abdominal exam benign. Total bili remained elevated but AST/ALT were improving at discharge. Would recommend a repeat CMP with the patients PCP within one week of discharge, She is being discharged home in satisfactory condition.     Day of Discharge     Subjective:  No acute events overnight. Patient feeling well. Denies any pain. She would like to be discharged home.     Physical Exam:  Temp:  [96.8 °F (36 °C)-98.6 °F (37 °C)] 97.7 °F (36.5 °C)  Heart Rate:  [] 87  Resp:  [16] 16  BP: (124-160)/() 137/97  Body mass index is 24.32 kg/m².  Physical Exam  General: Alert, no acute distress. Lying in bed.   ENT: No conjunctival injection or scleral icterus. Moist mucous membranes.   Neuro: Eyes open and moving in all directions, strength normal in all extremities, no focal deficits. No tremor.   Lungs: Clear to auscultation bilaterally. No wheeze or crackles. No distress.   Heart: RRR, no murmurs. No edema.  Abdomen: Soft, non-tender, non-distended. Normal bowel sounds.  Ext: Warm and well-perfused. No edema.   Skin: No rashes or lesions. IV site without swelling or erythema.     Consultants     Consult Orders (all) (From admission, onward)       Start     Ordered    12/10/24 0222  Inpatient consult to Access  Center  Once        Provider:  (Not yet assigned)    12/10/24 0224    12/10/24 0129  [facility] (on-call MD unless specified) Details  Once        Specialty:  Hospitalist  Provider:  (Not yet assigned)    12/10/24 0128    12/09/24 2325  Psych / [facility]  Once        Provider:  (Not yet assigned)    12/09/24 2324                  Procedures     * Surgery not found *    Imaging Results (All)       Procedure Component Value Units Date/Time    CT Abdomen Pelvis Without Contrast [[id number]] Collected: 12/11/24 1216     Updated: 12/11/24 1217    Narrative:      CT ABDOMEN AND PELVIS WITHOUT IV CONTRAST     HISTORY: 29-year-old female with abdominal pain. Pancreatitis history.  Downward trending lipase. Lipase 3 weeks ago was 180 and is 72 today.     TECHNIQUE: Radiation dose reduction techniques were utilized, including  automated exposure control and exposure modulation based on body size.   3 mm images were obtained through the abdomen and pelvis without the  administration of IV contrast. Compared with prior noncontrasted CT  11/14/2024.     FINDINGS:  1. Noncontrasted pancreas appears unremarkable. There is no evidence for  acute pancreatitis and there are no changes of chronic pancreatitis.  There is no peripancreatic fluid or fluid collection. There is no  pancreatic ductal dilatation. Gallbladder is contracted. There is no  biliary dilatation. There is at least a moderate degree of fatty  infiltration of the liver. Splenic size is normal. Adrenals are  unremarkable.     2. Stable appearance of the kidneys with multiple variable-sized renal  cysts and bilateral nephrolithiasis. There are no ureteral stones or  hydronephrosis bilaterally. Urinary bladder is collapsed. Noncontrasted  uterus and adnexa appear unremarkable.     3. There is no acute bowel abnormality. There is no colitis, bowel  ileus, or obstruction. Appendix is surgically absent.     4. No evidence for pneumonia or pleural effusions at the visualized  "lung  bases.             XR Chest 1 View [[id number]] Collected: 12/10/24 0250     Updated: 12/10/24 0254    Narrative:      SINGLE VIEW OF THE CHEST     HISTORY: Leukocytosis     COMPARISON: November 20, 2023     FINDINGS:  Heart size is within normal limits. No pneumothorax, pleural effusion,  or acute infiltrate is seen. There is some linear atelectasis versus  scarring noted within the left midlung.       Impression:      Linear scarring versus atelectasis noted within the left midlung.     This report was finalized on 12/10/2024 2:51 AM by Dr. Teri Grullon M.D on Workstation: [id number]                 Pertinent Labs     Results from last 7 days   Lab Units 12/11/24  0522 12/10/24  0556 12/09/24  2359   WBC 10*3/mm3 9.14 9.22 11.12*   HEMOGLOBIN g/dL 12.8 13.8 16.8*   PLATELETS 10*3/mm3 150 219 301     Results from last 7 days   Lab Units 12/11/24  0522 12/10/24  0556 12/10/24  0045   SODIUM mmol/L 136 141 142   POTASSIUM mmol/L 3.8 3.5 4.2   CHLORIDE mmol/L 100 102 103   CO2 mmol/L 23.5 23.1 24.4   BUN mg/dL 7 6 7   CREATININE mg/dL 0.68 0.59 0.86   GLUCOSE mg/dL 82 78 87   EGFR mL/min/1.73 120.3 124.5 93.3     Results from last 7 days   Lab Units 12/11/24  0522 12/10/24  0045   ALBUMIN g/dL 3.6 3.9   BILIRUBIN mg/dL 1.5* 0.5   ALK PHOS U/L 65 82   AST (SGOT) U/L 59* 85*   ALT (SGPT) U/L 39* 47*     Results from last 7 days   Lab Units 12/11/24  0522 12/10/24  0556 12/10/24  0045   CALCIUM mg/dL 9.0 8.8 9.2   ALBUMIN g/dL 3.6  --  3.9   MAGNESIUM mg/dL 1.5*  --  1.9   PHOSPHORUS mg/dL 3.2 2.8  --      Results from last 7 days   Lab Units 12/11/24 0522 12/10/24  0045   LIPASE U/L 72* 87*             Invalid input(s): \"LDLCALC\"          Test Results Pending at Discharge     Pending Results       None              Discharge Details        Discharge Medications        Continue These Medications        Instructions Start Date   amLODIPine 2.5 MG tablet  Commonly known as: NORVASC   2.5 mg, Daily    "   pantoprazole 40 MG EC tablet  Commonly known as: PROTONIX   40 mg, Oral, Daily      sucralfate 1 GM/10ML suspension  Commonly known as: CARAFATE   1 g, Oral, 4 Times Daily With Meals & Nightly               Allergies   Allergen Reactions    Azithromycin Hives    Iodinated Contrast Media Hives    Penicillins Hives       Discharge Disposition:  Home or Self Care      Discharge Diet:  Diet Order   Procedures    Diet: Cardiac; Healthy Heart (2-3 Na+); Safe Tray; Texture: Regular (IDDSI 7); Fluid Consistency: Thin (IDDSI 0)       Discharge Activity: Activity as tolerated      CODE STATUS:    Code Status and Medical Interventions: CPR (Attempt to Resuscitate); Full   Ordered at: 12/10/24 0224     Code Status (Patient has no pulse and is not breathing):    CPR (Attempt to Resuscitate)     Medical Interventions (Patient has pulse or is breathing):    Full       No future appointments.   Follow-up Information       Provider, No Known .    Contact information:  [facility] [address] [phone]                             Time Spent on Discharge:  Greater than 30 minutes      Brisa Galvez MD  [facility]  12/11/24  18:13 EST

## 2024-12-11 NOTE — SIGNIFICANT NOTE
12/11/24 0904   OTHER   Discipline physical therapist   Therapy Assessment/Plan (PT)   Criteria for Skilled Interventions Met (PT) no problems identified which require skilled intervention  (30 yo admitted with etoh abuse.  Per nsg doc, pt had no mobility issues PTA and is now up adlib with sitter present and has an ampac of 24.  No indication for acute PT needs.  Will s/o.)

## 2024-12-11 NOTE — CASE MANAGEMENT/SOCIAL WORK
Discharge Planning Assessment  [facility]     Patient Name: Adriel Armenta  MRN: [id number]  Today's Date: 12/11/2024    Admit Date: 12/9/2024    Plan: Home   Discharge Needs Assessment       Row Name 12/11/24 1457       Living Environment    People in Home alone    Current Living Arrangements apartment    Potentially Unsafe Housing Conditions none    Primary Care Provided by self    Family Caregiver if Needed significant other    Family Caregiver Names Edouard s/o    Quality of Family Relationships involved;helpful    Able to Return to Prior Arrangements yes       Resource/Environmental Concerns    Resource/Environmental Concerns none    Transportation Concerns none       Transition Planning    Patient/Family Anticipates Transition to home    Patient/Family Anticipated Services at Transition none    Transportation Anticipated other (see comments)  will likely need cab voucher       Discharge Needs Assessment    Readmission Within the Last 30 Days no previous admission in last 30 days    Equipment Currently Used at Home none    Concerns to be Addressed no discharge needs identified    Anticipated Changes Related to Illness none    Equipment Needed After Discharge none                   Discharge Plan       Row Name 12/11/24 1458       Plan    Plan Home    Patient/Family in Agreement with Plan yes    Plan Comments Spoke with patient at bedside, introduced self, explained CCP role and verified face sheet and pharmacy information. Pt lives alone in 1 level home with 3 ENA, she is IADL's, uses no medical equipment, has no HH or SNF history. She plans home alone, her s/o Edouard can assist if needed, will likely need cab voucher, thinks her apt is unlocked. She plans outpt [facility] treatment and daily AA meetings at [place]. Denies dc needs, states her father is to have no information and ok to speak to Edouard, contact added. CCP there will follow - Brisa WASSERMAN                  Continued Care and Services - Admitted Since 12/9/2024    No  active coordination exists for this encounter.       Expected Discharge Date and Time       Expected Discharge Date Expected Discharge Time    Dec 13, 2024            Demographic Summary       Row Name 12/11/24 1452       General Information    Admission Type observation                   Functional Status       Row Name 12/11/24 1452       Functional Status    Usual Activity Tolerance excellent    Current Activity Tolerance excellent       Assessment of Health Literacy    Health Literacy Good       Functional Status, IADL    Medications independent    Meal Preparation independent    Housekeeping independent    Laundry independent    Shopping independent       Mental Status    General Appearance WDL WDL       Mental Status Summary    Recent Changes in Mental Status/Cognitive Functioning no changes                   Psychosocial    No documentation.                  Abuse/Neglect    No documentation.                  Legal       Row Name 12/11/24 1457       Financial/Legal    Who Manages Finances if Patient Unable father                   Substance Abuse    No documentation.                  Patient Forms    No documentation.                     Brisa Sen RN

## 2024-12-11 NOTE — PROGRESS NOTES
"[facility] f/u d/t ETOH and drug use. Chart reviewed and spoke w/ primary RN who reports pt has not expressed any SI but is wanting to leave AMA despite remaining on suicide precautions.     Pt in room w/ sitter upon entry. Sitter stepped out to allow privacy. Introduced self and role. Pt denies current depression/anxiety. Denies current wish to be dead/SI/HI/AVH. Discussed statements of SI made at admission which pt attributes to being intoxicated. Stated \"I'm typically a happy person when sober.\" Stated she had contacted the ambulance herself because she didn't trust her judgment d/t intoxication. Reports using ETOH to avoid feelings of grief r/t loss of mother and previous boyfriend. Discussed wanting to get into OP MH counseling \"to teach me how not to spiral.\" Future-oriented d/t being in a supportive relationship, hoping to interview for a potential new job, and wanting to get sober. Intends to maintain contact w/ her sponsor and begin daily AA meetings w/ her home group after d/c. Reports that she wants to attend an AA meeting tonight and doesn't feel it's necessary to stay in the hospital d/t no ETOH withdrawal sxs. Pt accepted list of OP MH counseling resources considering pt's lack of insurance at this time. Collaborated w/ pt to create safety plan d/t previous expression of SI. Updated primary RN who stated that MD was requesting involvement of psychiatrist, Dr. Turner.     Contacted Dr. Turner who requested for clinician to receive collateral information from family member who is agreeable w/ pt being safe to d/c. Informed pt of need to contact family member. Pt stated that her boyfriend is currently working and will not be available until midnight and her father is unavailable by phone d/t being out of the country. Stated she has a best friend who would be available. However, Dr. Turner only comfortable w/ confirmation from father or significant other. Sat w/ pt while she awaited phone " call from boyfriend. When boyfriend contacted pt via phone call, pt explained purpose of phone call and boyfriend stated that he feels comfortable w/ pt's d/c, denied any safety concerns, and provided reassurance that he would be w/ pt after work tonight. Updated Dr. Turner who stated that d/c would be appropriate if pt's boyfriend is able to pick pt up at d/c. Informed pt that her boyfriend will need to be present at d/c. Pt stated that he will not be able to pick her up for d/c until after midnight. Informed primary RN who then informed MD via secure message. MD stated she was comfortable w/ the d/c plan. Updated pt w/ primary RN present. Pt was initially frustrated w/ process but was ultimately agreeable w/ plan for leaving w/ boyfriend after midnight. Expressed appreciation for pt's patience and cooperation. Access will sign off.

## 2024-12-12 VITALS
OXYGEN SATURATION: 100 % | TEMPERATURE: 98.1 F | RESPIRATION RATE: 18 BRPM | HEART RATE: 77 BPM | DIASTOLIC BLOOD PRESSURE: 88 MMHG | WEIGHT: 164.68 LBS | BODY MASS INDEX: 24.39 KG/M2 | HEIGHT: 69 IN | SYSTOLIC BLOOD PRESSURE: 117 MMHG

## 2024-12-12 LAB
ALBUMIN SERPL-MCNC: 3.6 G/DL (ref 3.5–5.2)
ALBUMIN/GLOB SERPL: 0.9 G/DL
ALP SERPL-CCNC: 74 U/L (ref 39–117)
ALT SERPL W P-5'-P-CCNC: 42 U/L (ref 1–33)
ANION GAP SERPL CALCULATED.3IONS-SCNC: 11.2 MMOL/L (ref 5–15)
AST SERPL-CCNC: 60 U/L (ref 1–32)
BILIRUB SERPL-MCNC: 0.4 MG/DL (ref 0–1.2)
BUN SERPL-MCNC: 5 MG/DL (ref 6–20)
BUN/CREAT SERPL: 7.6 (ref 7–25)
CALCIUM SPEC-SCNC: 9.2 MG/DL (ref 8.6–10.5)
CHLORIDE SERPL-SCNC: 101 MMOL/L (ref 98–107)
CO2 SERPL-SCNC: 23.8 MMOL/L (ref 22–29)
CREAT SERPL-MCNC: 0.66 MG/DL (ref 0.57–1)
DEPRECATED RDW RBC AUTO: 43.6 FL (ref 37–54)
EGFRCR SERPLBLD CKD-EPI 2021: 121.2 ML/MIN/1.73
ERYTHROCYTE [DISTWIDTH] IN BLOOD BY AUTOMATED COUNT: 12.7 % (ref 12.3–15.4)
GLOBULIN UR ELPH-MCNC: 3.8 GM/DL
GLUCOSE SERPL-MCNC: 91 MG/DL (ref 65–99)
HCT VFR BLD AUTO: 39.2 % (ref 34–46.6)
HGB BLD-MCNC: 13.9 G/DL (ref 12–15.9)
MAGNESIUM SERPL-MCNC: 1.9 MG/DL (ref 1.6–2.6)
MCH RBC QN AUTO: 33.2 PG (ref 26.6–33)
MCHC RBC AUTO-ENTMCNC: 35.5 G/DL (ref 31.5–35.7)
MCV RBC AUTO: 93.6 FL (ref 79–97)
PHOSPHATE SERPL-MCNC: 3.3 MG/DL (ref 2.5–4.5)
PLATELET # BLD AUTO: 126 10*3/MM3 (ref 140–450)
PMV BLD AUTO: 10.2 FL (ref 6–12)
POTASSIUM SERPL-SCNC: 3.4 MMOL/L (ref 3.5–5.2)
PROT SERPL-MCNC: 7.4 G/DL (ref 6–8.5)
RBC # BLD AUTO: 4.19 10*6/MM3 (ref 3.77–5.28)
SODIUM SERPL-SCNC: 136 MMOL/L (ref 136–145)
WBC NRBC COR # BLD AUTO: 9.05 10*3/MM3 (ref 3.4–10.8)

## 2024-12-12 PROCEDURE — 80053 COMPREHEN METABOLIC PANEL: CPT | Performed by: HOSPITALIST

## 2024-12-12 PROCEDURE — 84100 ASSAY OF PHOSPHORUS: CPT | Performed by: HOSPITALIST

## 2024-12-12 PROCEDURE — G0378 HOSPITAL OBSERVATION PER HR: HCPCS

## 2024-12-12 PROCEDURE — 83735 ASSAY OF MAGNESIUM: CPT | Performed by: HOSPITALIST

## 2024-12-12 PROCEDURE — 85027 COMPLETE CBC AUTOMATED: CPT | Performed by: HOSPITALIST

## 2024-12-12 RX ADMIN — ACETAMINOPHEN 650 MG: 325 TABLET ORAL at 12:26

## 2024-12-12 NOTE — PLAN OF CARE
Problem: Adult Inpatient Plan of Care  Goal: Plan of Care Review  Outcome: Progressing  Flowsheets (Taken 12/12/2024 1228)  Progress: no change  Outcome Evaluation: Patient is alert and oriented x4, up ad hui in room, VSS. Suicide precautions remain in place- patient to discharge with boyfriend. Patient was able to reach boyfriend and he will pick her up in about an hour. Bed alarm off, bed in lowest position, and call light within reach.  Plan of Care Reviewed With: patient  Goal: Patient-Specific Goal (Individualized)  Outcome: Progressing  Goal: Absence of Hospital-Acquired Illness or Injury  Outcome: Progressing  Intervention: Identify and Manage Fall Risk  Recent Flowsheet Documentation  Taken 12/12/2024 0800 by Kristine Christensen RN  Safety Promotion/Fall Prevention:   assistive device/personal items within reach   clutter free environment maintained   fall prevention program maintained   lighting adjusted   nonskid shoes/slippers when out of bed   room organization consistent   safety round/check completed   toileting scheduled  Intervention: Prevent Skin Injury  Recent Flowsheet Documentation  Taken 12/12/2024 0800 by Kristine Christensen RN  Body Position:   position changed independently   weight shifting   right   side-lying  Taken 12/12/2024 0755 by Kristine Christensen RN  Skin Protection: incontinence pads utilized  Intervention: Prevent and Manage VTE (Venous Thromboembolism) Risk  Recent Flowsheet Documentation  Taken 12/12/2024 0755 by Kristine Christensen RN  VTE Prevention/Management:   bilateral   SCDs (sequential compression devices) off   patient refused intervention  Intervention: Prevent Infection  Recent Flowsheet Documentation  Taken 12/12/2024 0800 by Kristine Christensen RN  Infection Prevention:   environmental surveillance performed   equipment surfaces disinfected   hand hygiene promoted   personal protective equipment utilized   rest/sleep promoted   single patient room provided  Goal: Optimal Comfort and  Wellbeing  Outcome: Progressing  Intervention: Provide Person-Centered Care  Recent Flowsheet Documentation  Taken 12/12/2024 0755 by Kristine Christensen RN  Trust Relationship/Rapport:   care explained   choices provided   emotional support provided   empathic listening provided   questions answered   questions encouraged   reassurance provided   thoughts/feelings acknowledged  Goal: Readiness for Transition of Care  Outcome: Progressing     Problem: Suicide Risk  Goal: Absence of Self-Harm  Outcome: Progressing  Intervention: Assess Risk to Self and Maintain Safety  Recent Flowsheet Documentation  Taken 12/12/2024 0800 by Kristine Christensen RN  Enhanced Safety Measures: bed alarm refused  Taken 12/12/2024 0755 by Kristine Christensen RN  Behavior Management: behavioral plan reviewed  Intervention: Promote Psychosocial Wellbeing  Recent Flowsheet Documentation  Taken 12/12/2024 0755 by Kristine Christensen RN  Supportive Measures: active listening utilized     Problem: Violence Risk or Actual  Goal: Anger and Impulse Control  Outcome: Progressing  Intervention: Minimize Safety Risk  Recent Flowsheet Documentation  Taken 12/12/2024 0800 by Kristine Christensen RN  Enhanced Safety Measures: bed alarm refused  Taken 12/12/2024 0755 by Kristine Christensen RN  Behavior Management: behavioral plan reviewed  Intervention: Promote Self-Control  Recent Flowsheet Documentation  Taken 12/12/2024 0755 by Kristine Christensen RN  Supportive Measures: active listening utilized  Environmental Support: calm environment promoted     Problem: Alcohol Withdrawal  Goal: Alcohol Withdrawal Symptom Control  Outcome: Progressing  Goal: Optimal Neurologic Function  Outcome: Progressing  Goal: Readiness for Change Identified  Outcome: Progressing  Intervention: Partner to Facilitate Behavior Change  Recent Flowsheet Documentation  Taken 12/12/2024 0755 by Kristine Christensen RN  Supportive Measures: active listening utilized     Problem: Fall Injury Risk  Goal: Absence of Fall and  Fall-Related Injury  Outcome: Progressing  Intervention: Identify and Manage Contributors  Recent Flowsheet Documentation  Taken 12/12/2024 0800 by Kristine Christensen RN  Medication Review/Management: medications reviewed  Intervention: Promote Injury-Free Environment  Recent Flowsheet Documentation  Taken 12/12/2024 0800 by Kristine Christensen RN  Safety Promotion/Fall Prevention:   assistive device/personal items within reach   clutter free environment maintained   fall prevention program maintained   lighting adjusted   nonskid shoes/slippers when out of bed   room organization consistent   safety round/check completed   toileting scheduled   Goal Outcome Evaluation:  Plan of Care Reviewed With: patient        Progress: no change  Outcome Evaluation: Patient is alert and oriented x4, up ad hui in room, VSS. Suicide precautions remain in place- patient to discharge with boyfriend. Patient was able to reach boyfriend and he will pick her up in about an hour. Bed alarm off, bed in lowest position, and call light within reach.

## 2024-12-12 NOTE — CASE MANAGEMENT/SOCIAL WORK
Case Management Discharge Note      Final Note: home no needs         Selected Continued Care - Discharged on 12/12/2024 Admission date: 12/9/2024 - Discharge disposition: Home or Self Care      Destination    No services have been selected for the patient.                Durable Medical Equipment    No services have been selected for the patient.                Dialysis/Infusion    No services have been selected for the patient.                Home Medical Care    No services have been selected for the patient.                Therapy    No services have been selected for the patient.                Community Resources    No services have been selected for the patient.                Community & DME    No services have been selected for the patient.                    Transportation Services  Private: Car    Final Discharge Disposition Code: 01 - home or self-care

## 2024-12-12 NOTE — NURSING NOTE
On shift change pt requested to be left alone as she was trying to sleep. Pt condition stable. No complaints noted at this time.

## 2024-12-12 NOTE — NURSING NOTE
"Per shift change report, pt was discharged and was awaiting ride @ 0000.   @ 0030 checked on pt and pt reported that the boyfriend, (designated ride) was still at work.   @ 0145 asked pt about the status of her ride, pt reported that pt was still at work.  @ approximately 0330 while in pt room, CNA called and reported that the pt is wanting to go home.   @ 0350 in pt room asked if boyfriend was coming to pick pt up, pt responded that the boyfriend got off work late and was tired so he went home. Pt reported that she did not feel safe for the boyfriend to drive up here tired. Informed pt that I will inform the CN. CN made aware.  @ 0401 attempted to call X1 boyfriend, no response, voicemail left.  @ 0405 went to pt room to explain that according to report, pt was to be have a familiar family member to pick her up and since pt's father is abroad, the boyfriend agreed to pick her. Asked pt if I can call the boyfriend, pt declined reported that the boyfriend will be mad if I called him. Asked pt what time the boyfriend goes to work, pt responded \" has to be at work by 3pm\". Informed pt that I will inform the CN.   @ 0408 House supervisor here and explained the above, HS recommended to call the [facility] center. @ 0409 called  Access centre, spoke with Babs, explained the above to Babs. Babs responded she is not familiar with the case. Babs reported that she will inform the clinician assigned to the pt/ case and clinician will call me back.  Pt is stable.     @ 0433 Access centre called, spoke with simone and informed Simone of the above. Simone reported that according to SALLY Marcum, The Psychiatrist was only comfortable if the pt's dad or boyfriend picked the pt from the hospital. Simone reports that according to the psychiatrist, they did not want to get the pt a uber or taxi voucher. Simone reported that she will contact the Psychiatrist if pt is still in the hospital and decide on what is th next " action.    Informed Leyla I will contact the boyfriend in the Am to see if he can pick the pt up.   @ 6320 informed CN.

## 2024-12-12 NOTE — NURSING NOTE
Patient called and let RN know that she had reached her boyfriend and he would be here to  shortly.   This RN called and discussed with [facility]- Trixie to confirm that patient was cleared to discharge with boyfriend and did not need to wait and see Dr. Turner. Trixie with [facility] confirmed that patient could discharge as soon as boyfriend arrives and patient does not need to see Dr. Turner.   Called and spoke to Dr. Galvez and updated her that patient's boyfriend would be arriving shortly and [facility] had cleared to discharge. Dr. Galvez happy with discharge plan.     Charge RN, Unit Manager, and House Supervisor notified.

## 2024-12-12 NOTE — PROGRESS NOTES
Patient was discharged yesterday but boyfriend was unable to pick her up after work.  I did go by and see her today and she is still feeling well.  No chest pain or shortness of breath.  Does not feel like she is withdrawing from alcohol.  CIWA scores low.  She continues to deny any thoughts of self-harm or suicidal ideation.  The patient's boyfriend called this morning and he is planning to come get her.  [facility] center was called and is agreeable with this plan still.  Her blood pressure was a bit high yesterday, but her home amlodipine was not restarted and trend has improved this morning.  LFTs fairly stable.  Platelets mildly decreased, likely secondary to alcohol use but no signs of active bleeding.  Plan remains for discharge and PCP follow-up within 1 week which was discussed with patient.  Patient discharged in satisfactory condition.    Brisa Galvez MD  [facility]

## 2024-12-13 ENCOUNTER — READMISSION MANAGEMENT (OUTPATIENT)
Dept: CALL CENTER | Facility: HOSPITAL | Age: 29
End: 2024-12-13
Payer: MEDICAID

## 2024-12-13 NOTE — OUTREACH NOTE
Prep Survey      Flowsheet Row Responses   [facility] facility patient discharged from? [facility]   Is LACE score < 7 ? No   Eligibility Not Eligible   What are the reasons patient is not eligible? Behavioral Health   Does the patient have one of the following disease processes/diagnoses(primary or secondary)? Other   Prep survey completed? Yes            Radha LEE - Registered Nurse

## 2025-01-23 ENCOUNTER — HOSPITAL ENCOUNTER (EMERGENCY)
Facility: HOSPITAL | Age: 30
Discharge: HOME | End: 2025-01-23
Attending: STUDENT IN AN ORGANIZED HEALTH CARE EDUCATION/TRAINING PROGRAM
Payer: MEDICAID

## 2025-01-23 VITALS
HEIGHT: 69 IN | TEMPERATURE: 97.8 F | DIASTOLIC BLOOD PRESSURE: 89 MMHG | HEART RATE: 99 BPM | RESPIRATION RATE: 18 BRPM | OXYGEN SATURATION: 95 % | BODY MASS INDEX: 22.22 KG/M2 | SYSTOLIC BLOOD PRESSURE: 134 MMHG | WEIGHT: 150 LBS

## 2025-01-23 DIAGNOSIS — R74.01 TRANSAMINITIS: ICD-10-CM

## 2025-01-23 DIAGNOSIS — K85.20 ALCOHOL-INDUCED ACUTE PANCREATITIS, UNSPECIFIED COMPLICATION STATUS: Primary | ICD-10-CM

## 2025-01-23 LAB
ALBUMIN SERPL-MCNC: 4.8 G/DL (ref 3.5–5.2)
ALBUMIN/GLOB SERPL: 1.3 G/DL
ALP SERPL-CCNC: 121 U/L (ref 39–117)
ALT SERPL W P-5'-P-CCNC: 167 U/L (ref 1–33)
ANION GAP SERPL CALCULATED.3IONS-SCNC: 21 MMOL/L (ref 5–15)
AST SERPL-CCNC: 358 U/L (ref 1–32)
BASOPHILS # BLD AUTO: 0.16 10*3/MM3 (ref 0–0.2)
BASOPHILS NFR BLD AUTO: 2.4 % (ref 0–1.5)
BILIRUB SERPL-MCNC: 1.2 MG/DL (ref 0–1.2)
BUN SERPL-MCNC: 10 MG/DL (ref 6–20)
BUN/CREAT SERPL: 12.3 (ref 7–25)
CALCIUM SPEC-SCNC: 9.7 MG/DL (ref 8.6–10.5)
CHLORIDE SERPL-SCNC: 92 MMOL/L (ref 98–107)
CO2 SERPL-SCNC: 23 MMOL/L (ref 22–29)
CREAT SERPL-MCNC: 0.81 MG/DL (ref 0.57–1)
DEPRECATED RDW RBC AUTO: 47.9 FL (ref 37–54)
EGFRCR SERPLBLD CKD-EPI 2021: 100.3 ML/MIN/1.73
EOSINOPHIL # BLD AUTO: 0.05 10*3/MM3 (ref 0–0.4)
EOSINOPHIL NFR BLD AUTO: 0.8 % (ref 0.3–6.2)
ERYTHROCYTE [DISTWIDTH] IN BLOOD BY AUTOMATED COUNT: 14 % (ref 12.3–15.4)
GLOBULIN UR ELPH-MCNC: 3.7 GM/DL
GLUCOSE SERPL-MCNC: 81 MG/DL (ref 65–99)
HCG SERPL QL: NEGATIVE
HCT VFR BLD AUTO: 46.6 % (ref 34–46.6)
HGB BLD-MCNC: 15.9 G/DL (ref 12–15.9)
HOLD SPECIMEN: NORMAL
IMM GRANULOCYTES # BLD AUTO: 0.02 10*3/MM3 (ref 0–0.05)
IMM GRANULOCYTES NFR BLD AUTO: 0.3 % (ref 0–0.5)
LIPASE SERPL-CCNC: 86 U/L (ref 13–60)
LYMPHOCYTES # BLD AUTO: 1.84 10*3/MM3 (ref 0.7–3.1)
LYMPHOCYTES NFR BLD AUTO: 28 % (ref 19.6–45.3)
MCH RBC QN AUTO: 32.1 PG (ref 26.6–33)
MCHC RBC AUTO-ENTMCNC: 34.1 G/DL (ref 31.5–35.7)
MCV RBC AUTO: 94.1 FL (ref 79–97)
MONOCYTES # BLD AUTO: 0.36 10*3/MM3 (ref 0.1–0.9)
MONOCYTES NFR BLD AUTO: 5.5 % (ref 5–12)
NEUTROPHILS NFR BLD AUTO: 4.13 10*3/MM3 (ref 1.7–7)
NEUTROPHILS NFR BLD AUTO: 63 % (ref 42.7–76)
NRBC BLD AUTO-RTO: 0 /100 WBC (ref 0–0.2)
PLATELET # BLD AUTO: 190 10*3/MM3 (ref 140–450)
PMV BLD AUTO: 9.6 FL (ref 6–12)
POTASSIUM SERPL-SCNC: 4.3 MMOL/L (ref 3.5–5.2)
PROT SERPL-MCNC: 8.5 G/DL (ref 6–8.5)
RBC # BLD AUTO: 4.95 10*6/MM3 (ref 3.77–5.28)
SODIUM SERPL-SCNC: 136 MMOL/L (ref 136–145)
WBC NRBC COR # BLD AUTO: 6.56 10*3/MM3 (ref 3.4–10.8)
WHOLE BLOOD HOLD COAG: NORMAL
WHOLE BLOOD HOLD SPECIMEN: NORMAL

## 2025-01-23 PROCEDURE — 36415 COLL VENOUS BLD VENIPUNCTURE: CPT

## 2025-01-23 PROCEDURE — 25810000003 LACTATED RINGERS SOLUTION: Performed by: STUDENT IN AN ORGANIZED HEALTH CARE EDUCATION/TRAINING PROGRAM

## 2025-01-23 PROCEDURE — 80053 COMPREHEN METABOLIC PANEL: CPT | Performed by: STUDENT IN AN ORGANIZED HEALTH CARE EDUCATION/TRAINING PROGRAM

## 2025-01-23 PROCEDURE — 83690 ASSAY OF LIPASE: CPT | Performed by: STUDENT IN AN ORGANIZED HEALTH CARE EDUCATION/TRAINING PROGRAM

## 2025-01-23 PROCEDURE — 96375 TX/PRO/DX INJ NEW DRUG ADDON: CPT

## 2025-01-23 PROCEDURE — 96374 THER/PROPH/DIAG INJ IV PUSH: CPT

## 2025-01-23 PROCEDURE — 85025 COMPLETE CBC W/AUTO DIFF WBC: CPT | Performed by: STUDENT IN AN ORGANIZED HEALTH CARE EDUCATION/TRAINING PROGRAM

## 2025-01-23 PROCEDURE — 84703 CHORIONIC GONADOTROPIN ASSAY: CPT | Performed by: STUDENT IN AN ORGANIZED HEALTH CARE EDUCATION/TRAINING PROGRAM

## 2025-01-23 PROCEDURE — 25010000002 ONDANSETRON PER 1 MG: Performed by: STUDENT IN AN ORGANIZED HEALTH CARE EDUCATION/TRAINING PROGRAM

## 2025-01-23 PROCEDURE — 99283 EMERGENCY DEPT VISIT LOW MDM: CPT

## 2025-01-23 PROCEDURE — 25010000002 MORPHINE PER 10 MG: Performed by: STUDENT IN AN ORGANIZED HEALTH CARE EDUCATION/TRAINING PROGRAM

## 2025-01-23 RX ORDER — ONDANSETRON 4 MG/1
4 TABLET, ORALLY DISINTEGRATING ORAL 4 TIMES DAILY PRN
Qty: 20 TABLET | Refills: 0 | Status: SHIPPED | OUTPATIENT
Start: 2025-01-23

## 2025-01-23 RX ORDER — ONDANSETRON 2 MG/ML
4 INJECTION INTRAMUSCULAR; INTRAVENOUS ONCE
Status: COMPLETED | OUTPATIENT
Start: 2025-01-23 | End: 2025-01-23

## 2025-01-23 RX ORDER — MORPHINE SULFATE 2 MG/ML
4 INJECTION, SOLUTION INTRAMUSCULAR; INTRAVENOUS ONCE
Status: COMPLETED | OUTPATIENT
Start: 2025-01-23 | End: 2025-01-23

## 2025-01-23 RX ORDER — HYDROCODONE BITARTRATE AND ACETAMINOPHEN 5; 325 MG/1; MG/1
1 TABLET ORAL 4 TIMES DAILY PRN
Qty: 12 TABLET | Refills: 0 | Status: SHIPPED | OUTPATIENT
Start: 2025-01-23 | End: 2025-01-26

## 2025-01-23 RX ADMIN — ONDANSETRON 4 MG: 2 INJECTION INTRAMUSCULAR; INTRAVENOUS at 21:23

## 2025-01-23 RX ADMIN — MORPHINE SULFATE 4 MG: 2 INJECTION, SOLUTION INTRAMUSCULAR; INTRAVENOUS at 21:24

## 2025-01-23 RX ADMIN — SODIUM CHLORIDE, POTASSIUM CHLORIDE, SODIUM LACTATE AND CALCIUM CHLORIDE 1000 ML: 600; 310; 30; 20 INJECTION, SOLUTION INTRAVENOUS at 21:23
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
